# Patient Record
Sex: FEMALE | Race: WHITE | HISPANIC OR LATINO | Employment: FULL TIME | URBAN - METROPOLITAN AREA
[De-identification: names, ages, dates, MRNs, and addresses within clinical notes are randomized per-mention and may not be internally consistent; named-entity substitution may affect disease eponyms.]

---

## 2017-04-28 ENCOUNTER — GENERIC CONVERSION - ENCOUNTER (OUTPATIENT)
Dept: OTHER | Facility: OTHER | Age: 52
End: 2017-04-28

## 2017-06-06 ENCOUNTER — ALLSCRIPTS OFFICE VISIT (OUTPATIENT)
Dept: OTHER | Facility: OTHER | Age: 52
End: 2017-06-06

## 2017-06-06 DIAGNOSIS — I47.1 SUPRAVENTRICULAR TACHYCARDIA (HCC): ICD-10-CM

## 2017-07-07 PROBLEM — I47.1 SVT (SUPRAVENTRICULAR TACHYCARDIA) (HCC): Chronic | Status: ACTIVE | Noted: 2017-07-07

## 2017-07-07 PROBLEM — I47.10 SVT (SUPRAVENTRICULAR TACHYCARDIA): Chronic | Status: ACTIVE | Noted: 2017-07-07

## 2017-07-07 RX ORDER — PANTOPRAZOLE SODIUM 40 MG/1
40 INJECTION, POWDER, FOR SOLUTION INTRAVENOUS ONCE
Status: CANCELLED | OUTPATIENT
Start: 2017-07-07 | End: 2017-07-07

## 2017-07-10 ENCOUNTER — HOSPITAL ENCOUNTER (OUTPATIENT)
Dept: NON INVASIVE DIAGNOSTICS | Facility: HOSPITAL | Age: 52
Discharge: HOME | End: 2017-07-10
Attending: INTERNAL MEDICINE | Admitting: INTERNAL MEDICINE
Payer: COMMERCIAL

## 2017-07-10 ENCOUNTER — ANESTHESIA (OUTPATIENT)
Dept: SURGERY | Facility: HOSPITAL | Age: 52
End: 2017-07-10
Payer: COMMERCIAL

## 2017-07-10 ENCOUNTER — ANESTHESIA EVENT (OUTPATIENT)
Dept: SURGERY | Facility: HOSPITAL | Age: 52
End: 2017-07-10
Payer: COMMERCIAL

## 2017-07-10 VITALS
HEART RATE: 81 BPM | DIASTOLIC BLOOD PRESSURE: 58 MMHG | BODY MASS INDEX: 23.73 KG/M2 | WEIGHT: 125.66 LBS | OXYGEN SATURATION: 98 % | SYSTOLIC BLOOD PRESSURE: 91 MMHG | TEMPERATURE: 97.5 F | HEIGHT: 61 IN | RESPIRATION RATE: 18 BRPM

## 2017-07-10 DIAGNOSIS — I47.1 PAROXYSMAL SUPRAVENTRICULAR TACHYCARDIA (HCC): ICD-10-CM

## 2017-07-10 LAB
ANION GAP SERPL CALCULATED.3IONS-SCNC: 6 MMOL/L (ref 4–13)
APTT PPP: 28 SECONDS (ref 23–35)
ATRIAL RATE: 73 BPM
BUN SERPL-MCNC: 22 MG/DL (ref 5–25)
CALCIUM SERPL-MCNC: 8.8 MG/DL (ref 8.3–10.1)
CHLORIDE SERPL-SCNC: 107 MMOL/L (ref 100–108)
CO2 SERPL-SCNC: 28 MMOL/L (ref 21–32)
CREAT SERPL-MCNC: 0.76 MG/DL (ref 0.6–1.3)
ERYTHROCYTE [DISTWIDTH] IN BLOOD BY AUTOMATED COUNT: 12.8 % (ref 11.6–15.1)
GFR SERPL CREATININE-BSD FRML MDRD: >60 ML/MIN/1.73SQ M
GLUCOSE P FAST SERPL-MCNC: 82 MG/DL (ref 65–99)
GLUCOSE SERPL-MCNC: 82 MG/DL (ref 65–140)
HCT VFR BLD AUTO: 43.2 % (ref 34.8–46.1)
HGB BLD-MCNC: 14.2 G/DL (ref 11.5–15.4)
MCH RBC QN AUTO: 30.9 PG (ref 26.8–34.3)
MCHC RBC AUTO-ENTMCNC: 32.9 G/DL (ref 31.4–37.4)
MCV RBC AUTO: 94 FL (ref 82–98)
P AXIS: 65 DEGREES
PLATELET # BLD AUTO: 253 THOUSANDS/UL (ref 149–390)
PMV BLD AUTO: 9.4 FL (ref 8.9–12.7)
POTASSIUM SERPL-SCNC: 3.7 MMOL/L (ref 3.5–5.3)
PR INTERVAL: 150 MS
QRS AXIS: 56 DEGREES
QRSD INTERVAL: 94 MS
QT INTERVAL: 372 MS
QTC INTERVAL: 409 MS
RBC # BLD AUTO: 4.6 MILLION/UL (ref 3.81–5.12)
SODIUM SERPL-SCNC: 141 MMOL/L (ref 136–145)
T WAVE AXIS: 41 DEGREES
VENTRICULAR RATE: 73 BPM
WBC # BLD AUTO: 4.6 THOUSAND/UL (ref 4.31–10.16)

## 2017-07-10 PROCEDURE — 93653 COMPRE EP EVAL TX SVT: CPT | Performed by: INTERNAL MEDICINE

## 2017-07-10 PROCEDURE — C1894 INTRO/SHEATH, NON-LASER: HCPCS | Performed by: INTERNAL MEDICINE

## 2017-07-10 PROCEDURE — C1730 CATH, EP, 19 OR FEW ELECT: HCPCS | Performed by: INTERNAL MEDICINE

## 2017-07-10 PROCEDURE — 85027 COMPLETE CBC AUTOMATED: CPT | Performed by: INTERNAL MEDICINE

## 2017-07-10 PROCEDURE — 93005 ELECTROCARDIOGRAM TRACING: CPT | Performed by: INTERNAL MEDICINE

## 2017-07-10 PROCEDURE — 80048 BASIC METABOLIC PNL TOTAL CA: CPT | Performed by: INTERNAL MEDICINE

## 2017-07-10 PROCEDURE — C1733 CATH, EP, OTHR THAN COOL-TIP: HCPCS | Performed by: INTERNAL MEDICINE

## 2017-07-10 PROCEDURE — 85730 THROMBOPLASTIN TIME PARTIAL: CPT | Performed by: INTERNAL MEDICINE

## 2017-07-10 PROCEDURE — 93613 INTRACARDIAC EPHYS 3D MAPG: CPT | Performed by: INTERNAL MEDICINE

## 2017-07-10 PROCEDURE — 93621 COMP EP EVL L PAC&REC C SINS: CPT | Performed by: INTERNAL MEDICINE

## 2017-07-10 RX ORDER — LIDOCAINE HYDROCHLORIDE 10 MG/ML
INJECTION, SOLUTION INFILTRATION; PERINEURAL AS NEEDED
Status: DISCONTINUED | OUTPATIENT
Start: 2017-07-10 | End: 2017-07-10 | Stop reason: SURG

## 2017-07-10 RX ORDER — HEPARIN SODIUM 1000 [USP'U]/ML
INJECTION, SOLUTION INTRAVENOUS; SUBCUTANEOUS AS NEEDED
Status: DISCONTINUED | OUTPATIENT
Start: 2017-07-10 | End: 2017-07-10 | Stop reason: SURG

## 2017-07-10 RX ORDER — LIDOCAINE HYDROCHLORIDE 10 MG/ML
INJECTION, SOLUTION INFILTRATION; PERINEURAL CODE/TRAUMA/SEDATION MEDICATION
Status: COMPLETED | OUTPATIENT
Start: 2017-07-10 | End: 2017-07-10

## 2017-07-10 RX ORDER — PANTOPRAZOLE SODIUM 40 MG/1
40 INJECTION, POWDER, FOR SOLUTION INTRAVENOUS ONCE
Status: DISCONTINUED | OUTPATIENT
Start: 2017-07-10 | End: 2017-07-10 | Stop reason: HOSPADM

## 2017-07-10 RX ORDER — PROTAMINE SULFATE 10 MG/ML
INJECTION, SOLUTION INTRAVENOUS AS NEEDED
Status: DISCONTINUED | OUTPATIENT
Start: 2017-07-10 | End: 2017-07-10 | Stop reason: SURG

## 2017-07-10 RX ORDER — FENTANYL CITRATE 50 UG/ML
INJECTION, SOLUTION INTRAMUSCULAR; INTRAVENOUS AS NEEDED
Status: DISCONTINUED | OUTPATIENT
Start: 2017-07-10 | End: 2017-07-10 | Stop reason: SURG

## 2017-07-10 RX ORDER — MIDAZOLAM HYDROCHLORIDE 1 MG/ML
INJECTION INTRAMUSCULAR; INTRAVENOUS AS NEEDED
Status: DISCONTINUED | OUTPATIENT
Start: 2017-07-10 | End: 2017-07-10 | Stop reason: SURG

## 2017-07-10 RX ORDER — SODIUM CHLORIDE 9 MG/ML
INJECTION, SOLUTION INTRAVENOUS CONTINUOUS PRN
Status: DISCONTINUED | OUTPATIENT
Start: 2017-07-10 | End: 2017-07-10 | Stop reason: SURG

## 2017-07-10 RX ORDER — EPHEDRINE SULFATE 50 MG/ML
INJECTION, SOLUTION INTRAVENOUS AS NEEDED
Status: DISCONTINUED | OUTPATIENT
Start: 2017-07-10 | End: 2017-07-10 | Stop reason: SURG

## 2017-07-10 RX ORDER — PROPOFOL 10 MG/ML
INJECTION, EMULSION INTRAVENOUS CONTINUOUS PRN
Status: DISCONTINUED | OUTPATIENT
Start: 2017-07-10 | End: 2017-07-10 | Stop reason: SURG

## 2017-07-10 RX ADMIN — MIDAZOLAM HYDROCHLORIDE 2 MG: 1 INJECTION, SOLUTION INTRAMUSCULAR; INTRAVENOUS at 07:50

## 2017-07-10 RX ADMIN — HEPARIN SODIUM 2500 UNITS: 1000 INJECTION INTRAVENOUS; SUBCUTANEOUS at 09:22

## 2017-07-10 RX ADMIN — FENTANYL CITRATE 100 MCG: 50 INJECTION, SOLUTION INTRAMUSCULAR; INTRAVENOUS at 08:29

## 2017-07-10 RX ADMIN — LIDOCAINE HYDROCHLORIDE 20 ML: 10 INJECTION, SOLUTION INFILTRATION; PERINEURAL at 08:32

## 2017-07-10 RX ADMIN — PROTAMINE SULFATE 20 MG: 10 INJECTION, SOLUTION INTRAVENOUS at 10:07

## 2017-07-10 RX ADMIN — EPHEDRINE SULFATE 5 MG: 50 INJECTION, SOLUTION INTRAMUSCULAR; INTRAVENOUS; SUBCUTANEOUS at 08:23

## 2017-07-10 RX ADMIN — EPHEDRINE SULFATE 5 MG: 50 INJECTION, SOLUTION INTRAMUSCULAR; INTRAVENOUS; SUBCUTANEOUS at 08:44

## 2017-07-10 RX ADMIN — PROPOFOL 50 MCG/KG/MIN: 10 INJECTION, EMULSION INTRAVENOUS at 08:01

## 2017-07-10 RX ADMIN — LIDOCAINE HYDROCHLORIDE 50 MG: 10 INJECTION, SOLUTION INFILTRATION; PERINEURAL at 08:01

## 2017-07-10 RX ADMIN — HEPARIN SODIUM 5000 UNITS: 1000 INJECTION INTRAVENOUS; SUBCUTANEOUS at 08:39

## 2017-07-10 RX ADMIN — EPHEDRINE SULFATE 5 MG: 50 INJECTION, SOLUTION INTRAMUSCULAR; INTRAVENOUS; SUBCUTANEOUS at 08:33

## 2017-07-10 RX ADMIN — CEFAZOLIN SODIUM 1000 MG: 1 SOLUTION INTRAVENOUS at 08:22

## 2017-07-10 RX ADMIN — SODIUM CHLORIDE: 0.9 INJECTION, SOLUTION INTRAVENOUS at 06:58

## 2018-01-14 VITALS — DIASTOLIC BLOOD PRESSURE: 68 MMHG | HEART RATE: 66 BPM | WEIGHT: 126 LBS | SYSTOLIC BLOOD PRESSURE: 110 MMHG

## 2018-07-28 ENCOUNTER — APPOINTMENT (OUTPATIENT)
Dept: LAB | Facility: HOSPITAL | Age: 53
End: 2018-07-28
Attending: INTERNAL MEDICINE
Payer: COMMERCIAL

## 2018-07-28 ENCOUNTER — TRANSCRIBE ORDERS (OUTPATIENT)
Dept: ADMINISTRATIVE | Facility: HOSPITAL | Age: 53
End: 2018-07-28

## 2018-07-28 DIAGNOSIS — E55.9 AVITAMINOSIS D: ICD-10-CM

## 2018-07-28 DIAGNOSIS — D50.9 IRON DEFICIENCY ANEMIA, UNSPECIFIED IRON DEFICIENCY ANEMIA TYPE: ICD-10-CM

## 2018-07-28 DIAGNOSIS — I47.1 PAROXYSMAL SUPRAVENTRICULAR TACHYCARDIA (HCC): ICD-10-CM

## 2018-07-28 DIAGNOSIS — I47.1 PAROXYSMAL SUPRAVENTRICULAR TACHYCARDIA (HCC): Primary | ICD-10-CM

## 2018-07-28 LAB
25(OH)D3 SERPL-MCNC: 26.7 NG/ML (ref 30–100)
ALBUMIN SERPL BCP-MCNC: 4.3 G/DL (ref 3.5–5.7)
ALP SERPL-CCNC: 40 U/L (ref 40–150)
ALT SERPL W P-5'-P-CCNC: 8 U/L (ref 7–52)
ANION GAP SERPL CALCULATED.3IONS-SCNC: 8 MMOL/L (ref 4–13)
AST SERPL W P-5'-P-CCNC: 12 U/L (ref 13–39)
BACTERIA UR QL AUTO: ABNORMAL /HPF
BASOPHILS # BLD AUTO: 0 THOUSANDS/ΜL (ref 0–0.1)
BASOPHILS NFR BLD AUTO: 1 % (ref 0–2)
BILIRUB SERPL-MCNC: 0.6 MG/DL (ref 0.2–1)
BILIRUB UR QL STRIP: NEGATIVE
BUN SERPL-MCNC: 15 MG/DL (ref 7–25)
CALCIUM SERPL-MCNC: 9.5 MG/DL (ref 8.6–10.5)
CHLORIDE SERPL-SCNC: 103 MMOL/L (ref 98–107)
CLARITY UR: CLEAR
CO2 SERPL-SCNC: 29 MMOL/L (ref 21–31)
COLOR UR: ABNORMAL
CREAT SERPL-MCNC: 0.8 MG/DL (ref 0.6–1.2)
EOSINOPHIL # BLD AUTO: 0 THOUSAND/ΜL (ref 0–0.61)
EOSINOPHIL NFR BLD AUTO: 1 % (ref 0–5)
ERYTHROCYTE [DISTWIDTH] IN BLOOD BY AUTOMATED COUNT: 12.9 % (ref 11.5–14.5)
FERRITIN SERPL-MCNC: 31 NG/ML (ref 8–388)
GFR SERPL CREATININE-BSD FRML MDRD: 84 ML/MIN/1.73SQ M
GLUCOSE SERPL-MCNC: 93 MG/DL (ref 65–99)
GLUCOSE UR STRIP-MCNC: NEGATIVE MG/DL
HCT VFR BLD AUTO: 40.8 % (ref 34.8–46.1)
HGB BLD-MCNC: 14 G/DL (ref 12–16)
HGB UR QL STRIP.AUTO: ABNORMAL
IRON SATN MFR SERPL: 29 %
IRON SERPL-MCNC: 94 UG/DL (ref 50–170)
KETONES UR STRIP-MCNC: NEGATIVE MG/DL
LDLC SERPL DIRECT ASSAY-MCNC: 81.2 MG/DL (ref 75–193)
LEUKOCYTE ESTERASE UR QL STRIP: ABNORMAL
LYMPHOCYTES # BLD AUTO: 1.6 THOUSANDS/ΜL (ref 0.6–4.47)
LYMPHOCYTES NFR BLD AUTO: 44 % (ref 21–51)
MCH RBC QN AUTO: 31.9 PG (ref 26–34)
MCHC RBC AUTO-ENTMCNC: 34.4 G/DL (ref 31–37)
MCV RBC AUTO: 93 FL (ref 81–99)
MONOCYTES # BLD AUTO: 0.3 THOUSAND/ΜL (ref 0.17–1.22)
MONOCYTES NFR BLD AUTO: 10 % (ref 2–12)
NEUTROPHILS # BLD AUTO: 1.6 THOUSANDS/ΜL (ref 1.4–6.5)
NEUTS SEG NFR BLD AUTO: 45 % (ref 42–75)
NITRITE UR QL STRIP: NEGATIVE
NON-SQ EPI CELLS URNS QL MICRO: ABNORMAL /HPF
NRBC BLD AUTO-RTO: 0 /100 WBCS
PH UR STRIP.AUTO: 7 [PH] (ref 5–8)
PLATELET # BLD AUTO: 250 THOUSANDS/UL (ref 149–390)
PMV BLD AUTO: 7.7 FL (ref 8.6–11.7)
POTASSIUM SERPL-SCNC: 3.6 MMOL/L (ref 3.5–5.5)
PROT SERPL-MCNC: 6.7 G/DL (ref 6.4–8.9)
PROT UR STRIP-MCNC: NEGATIVE MG/DL
RBC # BLD AUTO: 4.4 MILLION/UL (ref 3.9–5.2)
RBC #/AREA URNS AUTO: ABNORMAL /HPF
SODIUM SERPL-SCNC: 140 MMOL/L (ref 134–143)
SP GR UR STRIP.AUTO: 1.01 (ref 1–1.03)
TIBC SERPL-MCNC: 323 UG/DL (ref 250–450)
TRIGL SERPL-MCNC: 44 MG/DL (ref 44–166)
UROBILINOGEN UR QL STRIP.AUTO: 0.2 E.U./DL
WBC # BLD AUTO: 3.6 THOUSAND/UL (ref 4.8–10.8)
WBC #/AREA URNS AUTO: ABNORMAL /HPF

## 2018-07-28 PROCEDURE — 83550 IRON BINDING TEST: CPT

## 2018-07-28 PROCEDURE — 83721 ASSAY OF BLOOD LIPOPROTEIN: CPT

## 2018-07-28 PROCEDURE — 81001 URINALYSIS AUTO W/SCOPE: CPT

## 2018-07-28 PROCEDURE — 84478 ASSAY OF TRIGLYCERIDES: CPT

## 2018-07-28 PROCEDURE — 82728 ASSAY OF FERRITIN: CPT

## 2018-07-28 PROCEDURE — 80053 COMPREHEN METABOLIC PANEL: CPT

## 2018-07-28 PROCEDURE — 82306 VITAMIN D 25 HYDROXY: CPT

## 2018-07-28 PROCEDURE — 85025 COMPLETE CBC W/AUTO DIFF WBC: CPT

## 2018-07-28 PROCEDURE — 36415 COLL VENOUS BLD VENIPUNCTURE: CPT

## 2018-07-28 PROCEDURE — 83540 ASSAY OF IRON: CPT

## 2018-10-09 ENCOUNTER — TRANSCRIBE ORDERS (OUTPATIENT)
Dept: ADMINISTRATIVE | Facility: HOSPITAL | Age: 53
End: 2018-10-09

## 2018-10-09 DIAGNOSIS — Z12.39 SCREENING BREAST EXAMINATION: Primary | ICD-10-CM

## 2018-10-16 ENCOUNTER — HOSPITAL ENCOUNTER (OUTPATIENT)
Dept: MAMMOGRAPHY | Facility: HOSPITAL | Age: 53
Discharge: HOME | End: 2018-10-16
Attending: SPECIALIST
Payer: COMMERCIAL

## 2018-10-16 DIAGNOSIS — Z12.39 SCREENING BREAST EXAMINATION: ICD-10-CM

## 2018-10-16 PROCEDURE — 77067 SCR MAMMO BI INCL CAD: CPT

## 2018-10-16 PROCEDURE — 77063 BREAST TOMOSYNTHESIS BI: CPT

## 2018-10-24 ENCOUNTER — TRANSCRIBE ORDERS (OUTPATIENT)
Dept: ADMINISTRATIVE | Facility: HOSPITAL | Age: 53
End: 2018-10-24

## 2018-10-24 ENCOUNTER — APPOINTMENT (OUTPATIENT)
Dept: LAB | Facility: HOSPITAL | Age: 53
End: 2018-10-24
Payer: COMMERCIAL

## 2018-10-24 DIAGNOSIS — R53.83 FATIGUE, UNSPECIFIED TYPE: Primary | ICD-10-CM

## 2018-10-24 DIAGNOSIS — R53.83 FATIGUE, UNSPECIFIED TYPE: ICD-10-CM

## 2018-10-24 LAB
T3FREE SERPL-MCNC: 2.83 PG/ML (ref 2.3–4.2)
TSH SERPL DL<=0.05 MIU/L-ACNC: 2.11 UIU/ML (ref 0.45–5.33)

## 2018-10-24 PROCEDURE — 36415 COLL VENOUS BLD VENIPUNCTURE: CPT

## 2018-10-24 PROCEDURE — 84443 ASSAY THYROID STIM HORMONE: CPT

## 2018-10-24 PROCEDURE — 84481 FREE ASSAY (FT-3): CPT

## 2018-10-31 ENCOUNTER — TRANSCRIBE ORDERS (OUTPATIENT)
Dept: ADMINISTRATIVE | Facility: HOSPITAL | Age: 53
End: 2018-10-31

## 2018-10-31 DIAGNOSIS — R92.8 ABNORMAL MAMMOGRAM: Primary | ICD-10-CM

## 2018-11-13 ENCOUNTER — HOSPITAL ENCOUNTER (OUTPATIENT)
Dept: MAMMOGRAPHY | Facility: HOSPITAL | Age: 53
Discharge: HOME | End: 2018-11-13
Attending: SPECIALIST
Payer: COMMERCIAL

## 2018-11-13 DIAGNOSIS — R92.8 ABNORMAL MAMMOGRAM: ICD-10-CM

## 2018-11-13 PROCEDURE — G0279 TOMOSYNTHESIS, MAMMO: HCPCS

## 2018-11-13 PROCEDURE — 77065 DX MAMMO INCL CAD UNI: CPT

## 2018-12-12 ENCOUNTER — APPOINTMENT (OUTPATIENT)
Dept: LAB | Facility: HOSPITAL | Age: 53
End: 2018-12-12
Payer: COMMERCIAL

## 2018-12-12 ENCOUNTER — TRANSCRIBE ORDERS (OUTPATIENT)
Dept: ADMINISTRATIVE | Facility: HOSPITAL | Age: 53
End: 2018-12-12

## 2018-12-12 DIAGNOSIS — N95.9 MENOPAUSAL PROBLEM: Primary | ICD-10-CM

## 2018-12-12 DIAGNOSIS — N95.9 MENOPAUSAL PROBLEM: ICD-10-CM

## 2018-12-12 LAB
BASOPHILS # BLD AUTO: 0 THOUSANDS/ΜL (ref 0–0.1)
BASOPHILS NFR BLD AUTO: 1 % (ref 0–2)
EOSINOPHIL # BLD AUTO: 0.1 THOUSAND/ΜL (ref 0–0.61)
EOSINOPHIL NFR BLD AUTO: 2 % (ref 0–5)
ERYTHROCYTE [DISTWIDTH] IN BLOOD BY AUTOMATED COUNT: 13.2 % (ref 11.5–14.5)
HCT VFR BLD AUTO: 42.2 % (ref 34.8–46.1)
HGB BLD-MCNC: 13.7 G/DL (ref 12–16)
LYMPHOCYTES # BLD AUTO: 1.8 THOUSANDS/ΜL (ref 0.6–4.47)
LYMPHOCYTES NFR BLD AUTO: 37 % (ref 21–51)
MCH RBC QN AUTO: 30.7 PG (ref 26–34)
MCHC RBC AUTO-ENTMCNC: 32.5 G/DL (ref 31–37)
MCV RBC AUTO: 94 FL (ref 81–99)
MONOCYTES # BLD AUTO: 0.5 THOUSAND/ΜL (ref 0.17–1.22)
MONOCYTES NFR BLD AUTO: 10 % (ref 2–12)
NEUTROPHILS # BLD AUTO: 2.5 THOUSANDS/ΜL (ref 1.4–6.5)
NEUTS SEG NFR BLD AUTO: 50 % (ref 42–75)
NRBC BLD AUTO-RTO: 0 /100 WBCS
PLATELET # BLD AUTO: 253 THOUSANDS/UL (ref 149–390)
PMV BLD AUTO: 7.8 FL (ref 8.6–11.7)
RBC # BLD AUTO: 4.48 MILLION/UL (ref 3.9–5.2)
WBC # BLD AUTO: 4.9 THOUSAND/UL (ref 4.8–10.8)

## 2018-12-12 PROCEDURE — 85025 COMPLETE CBC W/AUTO DIFF WBC: CPT

## 2018-12-12 PROCEDURE — 36415 COLL VENOUS BLD VENIPUNCTURE: CPT

## 2019-02-04 ENCOUNTER — APPOINTMENT (EMERGENCY)
Dept: CT IMAGING | Facility: HOSPITAL | Age: 54
End: 2019-02-04
Payer: COMMERCIAL

## 2019-02-04 ENCOUNTER — HOSPITAL ENCOUNTER (EMERGENCY)
Facility: HOSPITAL | Age: 54
Discharge: HOME | End: 2019-02-04
Attending: EMERGENCY MEDICINE | Admitting: EMERGENCY MEDICINE
Payer: COMMERCIAL

## 2019-02-04 VITALS
BODY MASS INDEX: 23.41 KG/M2 | DIASTOLIC BLOOD PRESSURE: 83 MMHG | OXYGEN SATURATION: 100 % | RESPIRATION RATE: 18 BRPM | SYSTOLIC BLOOD PRESSURE: 138 MMHG | HEART RATE: 95 BPM | WEIGHT: 124 LBS | HEIGHT: 61 IN | TEMPERATURE: 98.5 F

## 2019-02-04 DIAGNOSIS — R51.9 NONINTRACTABLE HEADACHE, UNSPECIFIED CHRONICITY PATTERN, UNSPECIFIED HEADACHE TYPE: Primary | ICD-10-CM

## 2019-02-04 LAB
ALBUMIN SERPL BCP-MCNC: 4.2 G/DL (ref 3.5–5.7)
ALP SERPL-CCNC: 47 U/L (ref 40–150)
ALT SERPL W P-5'-P-CCNC: 12 U/L (ref 7–52)
ANION GAP SERPL CALCULATED.3IONS-SCNC: 7 MMOL/L (ref 4–13)
APTT PPP: 29 SECONDS (ref 26–38)
AST SERPL W P-5'-P-CCNC: 13 U/L (ref 13–39)
BASOPHILS # BLD AUTO: 0 THOUSANDS/ΜL (ref 0–0.1)
BASOPHILS NFR BLD AUTO: 1 % (ref 0–2)
BILIRUB SERPL-MCNC: 0.5 MG/DL (ref 0.2–1)
BUN SERPL-MCNC: 21 MG/DL (ref 7–25)
CALCIUM SERPL-MCNC: 9.2 MG/DL (ref 8.6–10.5)
CHLORIDE SERPL-SCNC: 102 MMOL/L (ref 98–107)
CO2 SERPL-SCNC: 28 MMOL/L (ref 21–31)
CREAT SERPL-MCNC: 0.74 MG/DL (ref 0.6–1.2)
EOSINOPHIL # BLD AUTO: 0 THOUSAND/ΜL (ref 0–0.61)
EOSINOPHIL NFR BLD AUTO: 0 % (ref 0–5)
ERYTHROCYTE [DISTWIDTH] IN BLOOD BY AUTOMATED COUNT: 13 % (ref 11.5–14.5)
GFR SERPL CREATININE-BSD FRML MDRD: 93 ML/MIN/1.73SQ M
GLUCOSE SERPL-MCNC: 101 MG/DL (ref 65–99)
HCT VFR BLD AUTO: 43.1 % (ref 34.8–46.1)
HGB BLD-MCNC: 14.3 G/DL (ref 12–16)
INR PPP: 0.96 (ref 0.9–1.5)
LYMPHOCYTES # BLD AUTO: 0.9 THOUSANDS/ΜL (ref 0.6–4.47)
LYMPHOCYTES NFR BLD AUTO: 12 % (ref 21–51)
MAGNESIUM SERPL-MCNC: 1.9 MG/DL (ref 1.9–2.7)
MCH RBC QN AUTO: 31 PG (ref 26–34)
MCHC RBC AUTO-ENTMCNC: 33.1 G/DL (ref 31–37)
MCV RBC AUTO: 94 FL (ref 81–99)
MONOCYTES # BLD AUTO: 0.3 THOUSAND/ΜL (ref 0.17–1.22)
MONOCYTES NFR BLD AUTO: 4 % (ref 2–12)
NEUTROPHILS # BLD AUTO: 6 THOUSANDS/ΜL (ref 1.4–6.5)
NEUTS SEG NFR BLD AUTO: 83 % (ref 42–75)
NRBC BLD AUTO-RTO: 0 /100 WBCS
PLATELET # BLD AUTO: 244 THOUSANDS/UL (ref 149–390)
PMV BLD AUTO: 7.6 FL (ref 8.6–11.7)
POTASSIUM SERPL-SCNC: 3.5 MMOL/L (ref 3.5–5.5)
PROT SERPL-MCNC: 6.6 G/DL (ref 6.4–8.9)
PROTHROMBIN TIME: 11.1 SECONDS (ref 10.2–13)
RBC # BLD AUTO: 4.6 MILLION/UL (ref 3.9–5.2)
SODIUM SERPL-SCNC: 137 MMOL/L (ref 134–143)
WBC # BLD AUTO: 7.2 THOUSAND/UL (ref 4.8–10.8)

## 2019-02-04 PROCEDURE — 85025 COMPLETE CBC W/AUTO DIFF WBC: CPT | Performed by: EMERGENCY MEDICINE

## 2019-02-04 PROCEDURE — 70450 CT HEAD/BRAIN W/O DYE: CPT

## 2019-02-04 PROCEDURE — 85730 THROMBOPLASTIN TIME PARTIAL: CPT | Performed by: EMERGENCY MEDICINE

## 2019-02-04 PROCEDURE — 80053 COMPREHEN METABOLIC PANEL: CPT | Performed by: EMERGENCY MEDICINE

## 2019-02-04 PROCEDURE — 99284 EMERGENCY DEPT VISIT MOD MDM: CPT

## 2019-02-04 PROCEDURE — 85610 PROTHROMBIN TIME: CPT | Performed by: EMERGENCY MEDICINE

## 2019-02-04 PROCEDURE — 83735 ASSAY OF MAGNESIUM: CPT | Performed by: EMERGENCY MEDICINE

## 2019-02-04 PROCEDURE — 36415 COLL VENOUS BLD VENIPUNCTURE: CPT | Performed by: EMERGENCY MEDICINE

## 2019-02-04 RX ORDER — PAROXETINE HYDROCHLORIDE 20 MG/1
10 TABLET, FILM COATED ORAL DAILY
COMMUNITY
End: 2020-06-22

## 2019-02-04 NOTE — DISCHARGE INSTRUCTIONS
Acute Headache, Ambulatory Care   GENERAL INFORMATION:   An acute headache  is pain or discomfort that starts suddenly and gets worse quickly  The cause of an acute headache may not be known  It may be triggered by stress, fatigue, hormones, food, or trauma  Common related symptoms include the following:   · Fever    · Sinus pressure    · Loss of memory    · Nausea or vomiting    · Problems with your vision, such as watery or red eyes, loss of vision, or pain in bright light    · Stiff neck    · Tenderness of the head and neck area    · Trouble staying awake, or being less alert than usual     · Weakness or less energy  Seek immediate care for the following symptoms:   · Severe pain    · A headache that occurs after a blow to the head, a fall, or other trauma     · Confusion or forgetfulness    · Numbness on one side of your face or body  Treatment for an acute headache  may include medicine to decrease pain  You may also need biofeedback or cognitive behavioral therapy  Ask your healthcare provider about these and other treatments for an acute headache  Manage my symptoms:   · Apply heat  on your head for 20 to 30 minutes every 2 hours for as many days as directed  Heat helps decrease pain and muscle spasms  You may alternate heat and ice  · Apply ice  on your head for 15 to 20 minutes every hour or as directed  Use an ice pack, or put crushed ice in a plastic bag  Cover it with a towel  Ice helps decrease pain  · Relax your muscles  Lie down in a comfortable position and close your eyes  Relax your muscles slowly  Start at your toes and work your way up your body  · Keep a record of your headaches  Write down when your headaches start and stop  Include your symptoms and what you were doing when the headache began  Record what you ate or drank for 24 hours before the headache started  Describe the pain and where it hurts  Keep track of what you did to treat your headache and whether it worked    Follow up with your healthcare provider as directed:  Bring your headache record with you when you see your healthcare provider  Write down your questions so you remember to ask them during your visits  CARE AGREEMENT:   You have the right to help plan your care  Learn about your health condition and how it may be treated  Discuss treatment options with your caregivers to decide what care you want to receive  You always have the right to refuse treatment  The above information is an  only  It is not intended as medical advice for individual conditions or treatments  Talk to your doctor, nurse or pharmacist before following any medical regimen to see if it is safe and effective for you  © 2014 [address] is for End User's use only and may not be sold, redistributed or otherwise used for commercial purposes  All illustrations and images included in CareNotes® are the copyrighted property of A D [employer] M [employer]  or Jhonatan Yeh

## 2019-02-04 NOTE — ED NOTES
Pt  States of massive headache since 0700 today  States became nauseated and vomited  Denies previous history  States going through menopause since September-- placed on Paxil to asssist in sleeping  States of pressure in forehead at present  Took Advil with relief  States has happened several times in the past and didn't get workup         Yajaira Steve, ALTAGRACIA  02/04/19 881 Hanna Camargo, ALTAGRACIA  02/04/19 3689

## 2019-02-04 NOTE — ED PROVIDER NOTES
History  Chief Complaint   Patient presents with    Headache     43-year-old female presents for evaluation of headache  Patient reports a headache described as sharp, bifrontal and nonradiating which is currently resolved  Headache's have been sharp and normally associated with an episode of vomiting which resolves the headache  Patient with an episode this afternoon and 1 episode 2 weeks prior  Prior to that patient with an episode 2 months prior  Patient reports no neurologic symptoms during these episodes, no slurred speech, no facial droop, no focal neurologic deficit  History provided by:  Patient   used: No        Prior to Admission Medications   Prescriptions Last Dose Informant Patient Reported? Taking? PARoxetine (PAXIL) 20 mg tablet   Yes Yes   Sig: Take 10 mg by mouth daily      Facility-Administered Medications: None       No past medical history on file  Past Surgical History:   Procedure Laterality Date    TUBAL LIGATION         No family history on file  I have reviewed and agree with the history as documented  Social History   Substance Use Topics    Smoking status: Never Smoker    Smokeless tobacco: Not on file    Alcohol use Not on file        Review of Systems   Constitutional: Negative for chills and fever  HENT: Negative for rhinorrhea and sore throat  Eyes: Negative for visual disturbance  Respiratory: Negative for cough and shortness of breath  Cardiovascular: Negative for chest pain and leg swelling  Gastrointestinal: Positive for nausea and vomiting  Negative for abdominal pain and diarrhea  Genitourinary: Negative for dysuria  Musculoskeletal: Negative for back pain and myalgias  Skin: Negative for rash  Neurological: Positive for headaches  Negative for dizziness  Psychiatric/Behavioral: Negative for confusion  All other systems reviewed and are negative        Physical Exam  Physical Exam   Constitutional: She is oriented to person, place, and time  She appears well-developed and well-nourished  HENT:   Head: Normocephalic and atraumatic  Nose: Nose normal    Mouth/Throat: Oropharynx is clear and moist  No oropharyngeal exudate  Eyes: Pupils are equal, round, and reactive to light  Conjunctivae and EOM are normal  No scleral icterus  Neck: Normal range of motion  Neck supple  No JVD present  No tracheal deviation present  Cardiovascular: Normal rate, regular rhythm and normal heart sounds  No murmur heard  Pulmonary/Chest: Effort normal and breath sounds normal  No respiratory distress  She has no wheezes  She has no rales  Abdominal: Soft  Bowel sounds are normal  There is no tenderness  There is no guarding  Musculoskeletal: Normal range of motion  She exhibits no edema or tenderness  Neurological: She is alert and oriented to person, place, and time  She displays normal reflexes  No cranial nerve deficit or sensory deficit  She exhibits normal muscle tone  Coordination normal    5/5 motor, nl sens   Skin: Skin is warm and dry  Psychiatric: She has a normal mood and affect  Her behavior is normal    Nursing note and vitals reviewed        Vital Signs  ED Triage Vitals [02/04/19 1115]   Temperature Pulse Respirations Blood Pressure SpO2   98 5 °F (36 9 °C) 95 18 138/83 100 %      Temp Source Heart Rate Source Patient Position - Orthostatic VS BP Location FiO2 (%)   Temporal -- Sitting Left arm --      Pain Score       2           Vitals:    02/04/19 1115   BP: 138/83   Pulse: 95   Patient Position - Orthostatic VS: Sitting       Visual Acuity  Visual Acuity      Most Recent Value   L Pupil Size (mm)  3   R Pupil Size (mm)  3          ED Medications  Medications - No data to display    Diagnostic Studies  Results Reviewed     Procedure Component Value Units Date/Time    Protime-INR [[id number]]  (Normal) Collected:  02/04/19 1147    Lab Status:  Final result Specimen:  Blood from Arm, Left Updated: 02/04/19 1220     Protime 11 1 seconds      INR 0 96    APTT [[id number]]  (Normal) Collected:  02/04/19 1147    Lab Status:  Final result Specimen:  Blood from Arm, Left Updated:  02/04/19 1220     PTT 29 seconds     Comprehensive metabolic panel [[id number]]  (Abnormal) Collected:  02/04/19 1147    Lab Status:  Final result Specimen:  Blood from Arm, Left Updated:  02/04/19 1216     Sodium 137 mmol/L      Potassium 3 5 mmol/L      Chloride 102 mmol/L      CO2 28 mmol/L      ANION GAP 7 mmol/L      BUN 21 mg/dL      Creatinine 0 74 mg/dL      Glucose 101 (H) mg/dL      Calcium 9 2 mg/dL      AST 13 U/L      ALT 12 U/L      Alkaline Phosphatase 47 U/L      Total Protein 6 6 g/dL      Albumin 4 2 g/dL      Total Bilirubin 0 50 mg/dL      eGFR 93 ml/min/1 73sq m     Narrative:         National Kidney Disease Education Program recommendations are as follows:  GFR calculation is accurate only with a steady state creatinine  Chronic Kidney disease less than 60 ml/min/1 73 sq  meters  Kidney failure less than 15 ml/min/1 73 sq  meters      Magnesium [[id number]]  (Normal) Collected:  02/04/19 1147    Lab Status:  Final result Specimen:  Blood from Arm, Left Updated:  02/04/19 1216     Magnesium 1 9 mg/dL     CBC and differential [[id number]]  (Abnormal) Collected:  02/04/19 1147    Lab Status:  Final result Specimen:  Blood from Arm, Left Updated:  02/04/19 1201     WBC 7 20 Thousand/uL      RBC 4 60 Million/uL      Hemoglobin 14 3 g/dL      Hematocrit 43 1 %      MCV 94 fL      MCH 31 0 pg      MCHC 33 1 g/dL      RDW 13 0 %      MPV 7 6 (L) fL      Platelets 266 Thousands/uL      nRBC 0 /100 WBCs      Neutrophils Relative 83 (H) %      Lymphocytes Relative 12 (L) %      Monocytes Relative 4 %      Eosinophils Relative 0 %      Basophils Relative 1 %      Neutrophils Absolute 6 00 Thousands/µL      Lymphocytes Absolute 0 90 Thousands/µL      Monocytes Absolute 0 30 Thousand/µL      Eosinophils Absolute 0 00 Thousand/µL Basophils Absolute 0 00 Thousands/µL                  CT head without contrast   Final Result by Skyler Ellis MD (02/04 1216)      No acute intracranial abnormality  Workstation performed: [id number]                    Procedures  Procedures       Phone Contacts  ED Phone Contact    ED Course  ED Course as of Feb 05 0725   [facility] Feb 04, 2019   1126 Patient seen examined at bedside  Labs and imaging ordered    1212 Labs reviewed, no acute abnormalities noted  1251 Imaging reviewed no acute abnormality noted  Discussed with patient results of labs and imaging and plan for discharge home  As patient has had acute episodes of headache which are new recommends to patient that she has follow-up with PMD as an outpatient and recommend outpatient MRI for further evaluation  MDM    Disposition  Final diagnoses:   Nonintractable headache, unspecified chronicity pattern, unspecified headache type     Time reflects when diagnosis was documented in both MDM as applicable and the Disposition within this note     Time User Action Codes Description Comment    2/4/2019 12:50 PM Hayley Due Add [R51] Sinus headache     2/4/2019 12:50 PM Nandini Crow Remove [R51] Sinus headache     2/4/2019 12:51 PM Nandini Crow Add [R51] Nonintractable headache, unspecified chronicity pattern, unspecified headache type       ED Disposition     ED Disposition Condition Date/Time Comment    Discharge  Mon Feb 4, 2019 12:50 PM Madhu Newton discharge to home/self care  Condition at discharge: Stable        Follow-up Information    None         Discharge Medication List as of 2/4/2019 12:51 PM      CONTINUE these medications which have NOT CHANGED    Details   PARoxetine (PAXIL) 20 mg tablet Take 10 mg by mouth daily, Historical Med           No discharge procedures on file      ED Provider  Electronically Signed by           Dane Vogel DO  02/05/19 0835

## 2019-07-09 ENCOUNTER — TRANSCRIBE ORDERS (OUTPATIENT)
Dept: LAB | Facility: HOSPITAL | Age: 54
End: 2019-07-09

## 2019-07-09 ENCOUNTER — APPOINTMENT (OUTPATIENT)
Dept: LAB | Facility: HOSPITAL | Age: 54
End: 2019-07-09
Attending: PODIATRIST
Payer: COMMERCIAL

## 2019-07-09 DIAGNOSIS — K71.9 HEPATOTOXICITY: ICD-10-CM

## 2019-07-09 DIAGNOSIS — K71.9 HEPATOTOXICITY: Primary | ICD-10-CM

## 2019-07-09 LAB
ALBUMIN SERPL BCP-MCNC: 4.5 G/DL (ref 3.5–5.7)
ALP SERPL-CCNC: 44 U/L (ref 40–150)
ALT SERPL W P-5'-P-CCNC: 10 U/L (ref 7–52)
AST SERPL W P-5'-P-CCNC: 13 U/L (ref 13–39)
BILIRUB DIRECT SERPL-MCNC: 0.1 MG/DL (ref 0–0.2)
BILIRUB SERPL-MCNC: 0.4 MG/DL (ref 0.2–1)
PROT SERPL-MCNC: 7 G/DL (ref 6.4–8.9)

## 2019-07-09 PROCEDURE — 36415 COLL VENOUS BLD VENIPUNCTURE: CPT

## 2019-07-09 PROCEDURE — 80076 HEPATIC FUNCTION PANEL: CPT

## 2019-09-12 ENCOUNTER — TRANSCRIBE ORDERS (OUTPATIENT)
Dept: ADMINISTRATIVE | Facility: HOSPITAL | Age: 54
End: 2019-09-12

## 2019-09-12 DIAGNOSIS — R31.0 GROSS HEMATURIA: Primary | ICD-10-CM

## 2019-09-14 ENCOUNTER — HOSPITAL ENCOUNTER (OUTPATIENT)
Dept: ULTRASOUND IMAGING | Facility: HOSPITAL | Age: 54
Discharge: HOME | End: 2019-09-14
Attending: UROLOGY
Payer: COMMERCIAL

## 2019-09-14 DIAGNOSIS — R31.0 GROSS HEMATURIA: ICD-10-CM

## 2019-09-14 PROCEDURE — 76770 US EXAM ABDO BACK WALL COMP: CPT

## 2019-10-08 ENCOUNTER — APPOINTMENT (OUTPATIENT)
Dept: LAB | Facility: HOSPITAL | Age: 54
End: 2019-10-08
Attending: PODIATRIST
Payer: COMMERCIAL

## 2019-10-08 ENCOUNTER — TRANSCRIBE ORDERS (OUTPATIENT)
Dept: ADMINISTRATIVE | Facility: HOSPITAL | Age: 54
End: 2019-10-08

## 2019-10-08 DIAGNOSIS — Z92.29 HISTORY OF USE OF HEPATOTOXIC DRUG: ICD-10-CM

## 2019-10-08 DIAGNOSIS — Z92.29 HISTORY OF USE OF HEPATOTOXIC DRUG: Primary | ICD-10-CM

## 2019-10-08 LAB
ALBUMIN SERPL BCP-MCNC: 4 G/DL (ref 3.5–5)
ALP SERPL-CCNC: 57 U/L (ref 46–116)
ALT SERPL W P-5'-P-CCNC: 15 U/L (ref 12–78)
AST SERPL W P-5'-P-CCNC: 8 U/L (ref 5–45)
BILIRUB DIRECT SERPL-MCNC: 0.15 MG/DL (ref 0–0.2)
BILIRUB SERPL-MCNC: 0.54 MG/DL (ref 0.2–1)
PROT SERPL-MCNC: 7.2 G/DL (ref 6.4–8.2)

## 2019-10-08 PROCEDURE — 36415 COLL VENOUS BLD VENIPUNCTURE: CPT

## 2019-10-08 PROCEDURE — 80076 HEPATIC FUNCTION PANEL: CPT

## 2019-12-03 ENCOUNTER — TRANSCRIBE ORDERS (OUTPATIENT)
Dept: ADMINISTRATIVE | Facility: HOSPITAL | Age: 54
End: 2019-12-03

## 2019-12-03 DIAGNOSIS — Z12.31 SCREENING MAMMOGRAM FOR HIGH-RISK PATIENT: Primary | ICD-10-CM

## 2019-12-24 ENCOUNTER — HOSPITAL ENCOUNTER (OUTPATIENT)
Dept: MAMMOGRAPHY | Facility: HOSPITAL | Age: 54
Discharge: HOME | End: 2019-12-24
Attending: SPECIALIST
Payer: COMMERCIAL

## 2019-12-24 VITALS — HEIGHT: 61 IN | BODY MASS INDEX: 23.6 KG/M2 | WEIGHT: 125 LBS

## 2019-12-24 DIAGNOSIS — Z12.31 SCREENING MAMMOGRAM FOR HIGH-RISK PATIENT: ICD-10-CM

## 2019-12-24 PROCEDURE — 77067 SCR MAMMO BI INCL CAD: CPT

## 2019-12-24 PROCEDURE — 77063 BREAST TOMOSYNTHESIS BI: CPT

## 2020-03-24 ENCOUNTER — TELEPHONE (OUTPATIENT)
Dept: NEPHROLOGY | Facility: CLINIC | Age: 55
End: 2020-03-24

## 2020-03-24 DIAGNOSIS — J30.1 ALLERGIC RHINITIS DUE TO POLLEN, UNSPECIFIED SEASONALITY: Primary | ICD-10-CM

## 2020-03-24 RX ORDER — FLUTICASONE PROPIONATE 50 MCG
1 SPRAY, SUSPENSION (ML) NASAL DAILY
Qty: 1 BOTTLE | Refills: 1 | Status: SHIPPED | OUTPATIENT
Start: 2020-03-24 | End: 2021-05-03

## 2020-03-24 NOTE — TELEPHONE ENCOUNTER
Patient is requesting a refill of Flonase nasal spray  She states that she is currently looking for a new PCP and would like a one time refill on this med

## 2020-04-22 ENCOUNTER — OFFICE VISIT (OUTPATIENT)
Dept: URGENT CARE | Facility: CLINIC | Age: 55
End: 2020-04-22
Payer: COMMERCIAL

## 2020-04-22 ENCOUNTER — HOSPITAL ENCOUNTER (EMERGENCY)
Facility: HOSPITAL | Age: 55
Discharge: HOME | End: 2020-04-22
Attending: EMERGENCY MEDICINE | Admitting: EMERGENCY MEDICINE
Payer: COMMERCIAL

## 2020-04-22 VITALS
WEIGHT: 125 LBS | HEIGHT: 61 IN | HEART RATE: 85 BPM | TEMPERATURE: 98.3 F | DIASTOLIC BLOOD PRESSURE: 69 MMHG | OXYGEN SATURATION: 97 % | SYSTOLIC BLOOD PRESSURE: 124 MMHG | BODY MASS INDEX: 23.6 KG/M2

## 2020-04-22 VITALS
RESPIRATION RATE: 18 BRPM | BODY MASS INDEX: 23.6 KG/M2 | DIASTOLIC BLOOD PRESSURE: 60 MMHG | HEART RATE: 88 BPM | SYSTOLIC BLOOD PRESSURE: 96 MMHG | HEIGHT: 61 IN | WEIGHT: 125 LBS | TEMPERATURE: 98.2 F | OXYGEN SATURATION: 100 %

## 2020-04-22 DIAGNOSIS — R51.9 SEVERE HEADACHE: ICD-10-CM

## 2020-04-22 DIAGNOSIS — R11.2 NAUSEA AND VOMITING, INTRACTABILITY OF VOMITING NOT SPECIFIED, UNSPECIFIED VOMITING TYPE: Primary | ICD-10-CM

## 2020-04-22 DIAGNOSIS — R11.2 NAUSEA AND VOMITING: Primary | ICD-10-CM

## 2020-04-22 DIAGNOSIS — R51.9 HEADACHE: ICD-10-CM

## 2020-04-22 LAB
ALBUMIN SERPL BCP-MCNC: 4.3 G/DL (ref 3.5–5.7)
ALP SERPL-CCNC: 46 U/L (ref 40–150)
ALT SERPL W P-5'-P-CCNC: 16 U/L (ref 7–52)
ANION GAP SERPL CALCULATED.3IONS-SCNC: 6 MMOL/L (ref 4–13)
AST SERPL W P-5'-P-CCNC: 22 U/L (ref 13–39)
BILIRUB SERPL-MCNC: 0.5 MG/DL (ref 0.2–1)
BUN SERPL-MCNC: 14 MG/DL (ref 7–25)
CALCIUM SERPL-MCNC: 9.1 MG/DL (ref 8.6–10.5)
CHLORIDE SERPL-SCNC: 100 MMOL/L (ref 98–107)
CO2 SERPL-SCNC: 28 MMOL/L (ref 21–31)
CREAT SERPL-MCNC: 0.69 MG/DL (ref 0.6–1.2)
ERYTHROCYTE [DISTWIDTH] IN BLOOD BY AUTOMATED COUNT: 13.1 % (ref 11.5–14.5)
GFR SERPL CREATININE-BSD FRML MDRD: 98 ML/MIN/1.73SQ M
GLUCOSE SERPL-MCNC: 112 MG/DL (ref 65–99)
HCT VFR BLD AUTO: 43.3 % (ref 42–47)
HGB BLD-MCNC: 14.2 G/DL (ref 12–16)
LIPASE SERPL-CCNC: 29 U/L (ref 11–82)
LYMPHOCYTES # BLD AUTO: 0.58 THOUSAND/UL (ref 0.6–4.47)
LYMPHOCYTES # BLD AUTO: 6 % (ref 20–51)
MAGNESIUM SERPL-MCNC: 1.9 MG/DL (ref 1.9–2.7)
MCH RBC QN AUTO: 30.9 PG (ref 26–34)
MCHC RBC AUTO-ENTMCNC: 32.9 G/DL (ref 31–37)
MCV RBC AUTO: 94 FL (ref 81–99)
MONOCYTES # BLD AUTO: 0.19 THOUSAND/UL (ref 0–1.22)
MONOCYTES NFR BLD AUTO: 2 % (ref 4–12)
NEUTS BAND NFR BLD MANUAL: 8 % (ref 0–8)
NEUTS SEG # BLD: 8.92 THOUSAND/UL (ref 1.81–6.82)
NEUTS SEG NFR BLD AUTO: 84 % (ref 42–75)
PLATELET # BLD AUTO: 248 THOUSANDS/UL (ref 149–390)
PLATELET BLD QL SMEAR: ADEQUATE
PMV BLD AUTO: 7.1 FL (ref 8.6–11.7)
POTASSIUM SERPL-SCNC: 4 MMOL/L (ref 3.5–5.5)
PROT SERPL-MCNC: 6.4 G/DL (ref 6.4–8.9)
RBC # BLD AUTO: 4.61 MILLION/UL (ref 3.9–5.2)
RBC MORPH BLD: NORMAL
SODIUM SERPL-SCNC: 134 MMOL/L (ref 134–143)
TOTAL CELLS COUNTED SPEC: 100
WBC # BLD AUTO: 9.7 THOUSAND/UL (ref 4.8–10.8)

## 2020-04-22 PROCEDURE — 99283 EMERGENCY DEPT VISIT LOW MDM: CPT

## 2020-04-22 PROCEDURE — 36415 COLL VENOUS BLD VENIPUNCTURE: CPT | Performed by: PHYSICIAN ASSISTANT

## 2020-04-22 PROCEDURE — 96361 HYDRATE IV INFUSION ADD-ON: CPT

## 2020-04-22 PROCEDURE — 83690 ASSAY OF LIPASE: CPT | Performed by: PHYSICIAN ASSISTANT

## 2020-04-22 PROCEDURE — 96374 THER/PROPH/DIAG INJ IV PUSH: CPT

## 2020-04-22 PROCEDURE — 83735 ASSAY OF MAGNESIUM: CPT | Performed by: PHYSICIAN ASSISTANT

## 2020-04-22 PROCEDURE — 99284 EMERGENCY DEPT VISIT MOD MDM: CPT | Performed by: PHYSICIAN ASSISTANT

## 2020-04-22 PROCEDURE — 85027 COMPLETE CBC AUTOMATED: CPT | Performed by: PHYSICIAN ASSISTANT

## 2020-04-22 PROCEDURE — 96375 TX/PRO/DX INJ NEW DRUG ADDON: CPT

## 2020-04-22 PROCEDURE — 99213 OFFICE O/P EST LOW 20 MIN: CPT | Performed by: NURSE PRACTITIONER

## 2020-04-22 PROCEDURE — 85007 BL SMEAR W/DIFF WBC COUNT: CPT | Performed by: PHYSICIAN ASSISTANT

## 2020-04-22 PROCEDURE — 80053 COMPREHEN METABOLIC PANEL: CPT | Performed by: PHYSICIAN ASSISTANT

## 2020-04-22 RX ORDER — DIPHENHYDRAMINE HYDROCHLORIDE 50 MG/ML
50 INJECTION INTRAMUSCULAR; INTRAVENOUS ONCE
Status: COMPLETED | OUTPATIENT
Start: 2020-04-22 | End: 2020-04-22

## 2020-04-22 RX ORDER — ONDANSETRON 4 MG/1
4 TABLET, ORALLY DISINTEGRATING ORAL ONCE
Status: COMPLETED | OUTPATIENT
Start: 2020-04-22 | End: 2020-04-22

## 2020-04-22 RX ORDER — MELATONIN
2000 DAILY
COMMUNITY

## 2020-04-22 RX ORDER — VITAMIN B COMPLEX
1 CAPSULE ORAL DAILY
COMMUNITY

## 2020-04-22 RX ORDER — ONDANSETRON 2 MG/ML
4 INJECTION INTRAMUSCULAR; INTRAVENOUS ONCE
Status: COMPLETED | OUTPATIENT
Start: 2020-04-22 | End: 2020-04-22

## 2020-04-22 RX ORDER — KETOROLAC TROMETHAMINE 30 MG/ML
30 INJECTION, SOLUTION INTRAMUSCULAR; INTRAVENOUS ONCE
Status: COMPLETED | OUTPATIENT
Start: 2020-04-22 | End: 2020-04-22

## 2020-04-22 RX ADMIN — ONDANSETRON 4 MG: 4 TABLET, ORALLY DISINTEGRATING ORAL at 15:11

## 2020-04-22 RX ADMIN — KETOROLAC TROMETHAMINE 30 MG: 30 INJECTION, SOLUTION INTRAMUSCULAR; INTRAVENOUS at 16:51

## 2020-04-22 RX ADMIN — SODIUM CHLORIDE 1000 ML: 0.9 INJECTION, SOLUTION INTRAVENOUS at 16:51

## 2020-04-22 RX ADMIN — DIPHENHYDRAMINE HYDROCHLORIDE 50 MG: 50 INJECTION INTRAMUSCULAR; INTRAVENOUS at 16:52

## 2020-04-22 RX ADMIN — ONDANSETRON 4 MG: 2 INJECTION INTRAMUSCULAR; INTRAVENOUS at 16:53

## 2020-06-22 ENCOUNTER — OFFICE VISIT (OUTPATIENT)
Dept: INTERNAL MEDICINE CLINIC | Facility: CLINIC | Age: 55
End: 2020-06-22
Payer: COMMERCIAL

## 2020-06-22 VITALS
BODY MASS INDEX: 23.98 KG/M2 | SYSTOLIC BLOOD PRESSURE: 110 MMHG | HEIGHT: 61 IN | TEMPERATURE: 98.7 F | DIASTOLIC BLOOD PRESSURE: 62 MMHG | WEIGHT: 127 LBS | HEART RATE: 90 BPM | RESPIRATION RATE: 14 BRPM | OXYGEN SATURATION: 98 %

## 2020-06-22 DIAGNOSIS — E55.9 VITAMIN D DEFICIENCY: ICD-10-CM

## 2020-06-22 DIAGNOSIS — Z11.59 ENCOUNTER FOR HEPATITIS C SCREENING TEST FOR LOW RISK PATIENT: ICD-10-CM

## 2020-06-22 DIAGNOSIS — R79.89 ABNORMAL CBC: ICD-10-CM

## 2020-06-22 DIAGNOSIS — Z13.31 DEPRESSION SCREENING NEGATIVE: ICD-10-CM

## 2020-06-22 DIAGNOSIS — Z23 ENCOUNTER FOR VACCINATION: ICD-10-CM

## 2020-06-22 DIAGNOSIS — R11.2 NAUSEA AND VOMITING, INTRACTABILITY OF VOMITING NOT SPECIFIED, UNSPECIFIED VOMITING TYPE: ICD-10-CM

## 2020-06-22 DIAGNOSIS — Z11.4 SCREENING FOR HIV (HUMAN IMMUNODEFICIENCY VIRUS): ICD-10-CM

## 2020-06-22 DIAGNOSIS — Z13.220 SCREENING FOR HYPERLIPIDEMIA: ICD-10-CM

## 2020-06-22 DIAGNOSIS — I47.1 SVT (SUPRAVENTRICULAR TACHYCARDIA) (HCC): Chronic | ICD-10-CM

## 2020-06-22 DIAGNOSIS — Z12.11 SCREEN FOR COLON CANCER: ICD-10-CM

## 2020-06-22 DIAGNOSIS — Z00.00 ENCOUNTER FOR PREVENTATIVE ADULT HEALTH CARE EXAMINATION: Primary | ICD-10-CM

## 2020-06-22 DIAGNOSIS — R10.9 ABDOMINAL PAIN, UNSPECIFIED ABDOMINAL LOCATION: ICD-10-CM

## 2020-06-22 DIAGNOSIS — R53.83 OTHER FATIGUE: ICD-10-CM

## 2020-06-22 PROCEDURE — 3008F BODY MASS INDEX DOCD: CPT | Performed by: NURSE PRACTITIONER

## 2020-06-22 PROCEDURE — 99204 OFFICE O/P NEW MOD 45 MIN: CPT | Performed by: NURSE PRACTITIONER

## 2020-06-22 PROCEDURE — 90715 TDAP VACCINE 7 YRS/> IM: CPT | Performed by: NURSE PRACTITIONER

## 2020-06-22 PROCEDURE — 1036F TOBACCO NON-USER: CPT | Performed by: NURSE PRACTITIONER

## 2020-06-22 PROCEDURE — 90471 IMMUNIZATION ADMIN: CPT | Performed by: NURSE PRACTITIONER

## 2020-06-22 RX ORDER — GARLIC 180 MG
TABLET, DELAYED RELEASE (ENTERIC COATED) ORAL
COMMUNITY

## 2020-06-23 ENCOUNTER — APPOINTMENT (OUTPATIENT)
Dept: LAB | Facility: HOSPITAL | Age: 55
End: 2020-06-23
Payer: COMMERCIAL

## 2020-06-23 DIAGNOSIS — E55.9 VITAMIN D DEFICIENCY: ICD-10-CM

## 2020-06-23 DIAGNOSIS — R79.89 ABNORMAL CBC: ICD-10-CM

## 2020-06-23 DIAGNOSIS — Z11.4 SCREENING FOR HIV (HUMAN IMMUNODEFICIENCY VIRUS): ICD-10-CM

## 2020-06-23 DIAGNOSIS — R53.83 OTHER FATIGUE: ICD-10-CM

## 2020-06-23 DIAGNOSIS — Z11.59 ENCOUNTER FOR HEPATITIS C SCREENING TEST FOR LOW RISK PATIENT: ICD-10-CM

## 2020-06-23 DIAGNOSIS — Z13.220 SCREENING FOR HYPERLIPIDEMIA: ICD-10-CM

## 2020-06-23 LAB
25(OH)D3 SERPL-MCNC: 43.9 NG/ML (ref 30–100)
BASOPHILS # BLD AUTO: 0.1 THOUSANDS/ΜL (ref 0–0.1)
BASOPHILS NFR BLD AUTO: 1 % (ref 0–2)
CHOLEST SERPL-MCNC: 166 MG/DL (ref 0–200)
EOSINOPHIL # BLD AUTO: 0.2 THOUSAND/ΜL (ref 0–0.61)
EOSINOPHIL NFR BLD AUTO: 3 % (ref 0–5)
ERYTHROCYTE [DISTWIDTH] IN BLOOD BY AUTOMATED COUNT: 13.4 % (ref 11.5–14.5)
HCT VFR BLD AUTO: 42.2 % (ref 42–47)
HCV AB SER QL: NORMAL
HDLC SERPL-MCNC: 77 MG/DL
HGB BLD-MCNC: 14.3 G/DL (ref 12–16)
LDLC SERPL CALC-MCNC: 80 MG/DL (ref 0–100)
LYMPHOCYTES # BLD AUTO: 1.7 THOUSANDS/ΜL (ref 0.6–4.47)
LYMPHOCYTES NFR BLD AUTO: 26 % (ref 21–51)
MCH RBC QN AUTO: 31.1 PG (ref 26–34)
MCHC RBC AUTO-ENTMCNC: 33.9 G/DL (ref 31–37)
MCV RBC AUTO: 92 FL (ref 81–99)
MONOCYTES # BLD AUTO: 0.6 THOUSAND/ΜL (ref 0.17–1.22)
MONOCYTES NFR BLD AUTO: 10 % (ref 2–12)
NEUTROPHILS # BLD AUTO: 4 THOUSANDS/ΜL (ref 1.4–6.5)
NEUTS SEG NFR BLD AUTO: 61 % (ref 42–75)
NONHDLC SERPL-MCNC: 89 MG/DL
PLATELET # BLD AUTO: 238 THOUSANDS/UL (ref 149–390)
PMV BLD AUTO: 7.3 FL (ref 8.6–11.7)
RBC # BLD AUTO: 4.61 MILLION/UL (ref 3.9–5.2)
TRIGL SERPL-MCNC: 45 MG/DL (ref 44–166)
TSH SERPL DL<=0.05 MIU/L-ACNC: 1.79 UIU/ML (ref 0.45–5.33)
WBC # BLD AUTO: 6.5 THOUSAND/UL (ref 4.8–10.8)

## 2020-06-23 PROCEDURE — 86803 HEPATITIS C AB TEST: CPT

## 2020-06-23 PROCEDURE — 87389 HIV-1 AG W/HIV-1&-2 AB AG IA: CPT

## 2020-06-23 PROCEDURE — 36415 COLL VENOUS BLD VENIPUNCTURE: CPT

## 2020-06-23 PROCEDURE — 84443 ASSAY THYROID STIM HORMONE: CPT

## 2020-06-23 PROCEDURE — 85025 COMPLETE CBC W/AUTO DIFF WBC: CPT

## 2020-06-23 PROCEDURE — 82306 VITAMIN D 25 HYDROXY: CPT

## 2020-06-23 PROCEDURE — 80061 LIPID PANEL: CPT

## 2020-06-24 LAB — HIV 1+2 AB+HIV1 P24 AG SERPL QL IA: NORMAL

## 2020-06-30 ENCOUNTER — HOSPITAL ENCOUNTER (OUTPATIENT)
Dept: ULTRASOUND IMAGING | Facility: HOSPITAL | Age: 55
Discharge: HOME | End: 2020-06-30
Payer: COMMERCIAL

## 2020-06-30 DIAGNOSIS — R11.2 NAUSEA AND VOMITING, INTRACTABILITY OF VOMITING NOT SPECIFIED, UNSPECIFIED VOMITING TYPE: ICD-10-CM

## 2020-06-30 PROCEDURE — 76705 ECHO EXAM OF ABDOMEN: CPT

## 2020-07-27 ENCOUNTER — OFFICE VISIT (OUTPATIENT)
Dept: INTERNAL MEDICINE CLINIC | Facility: CLINIC | Age: 55
End: 2020-07-27
Payer: COMMERCIAL

## 2020-07-27 VITALS
HEART RATE: 90 BPM | OXYGEN SATURATION: 98 % | HEIGHT: 61 IN | SYSTOLIC BLOOD PRESSURE: 112 MMHG | DIASTOLIC BLOOD PRESSURE: 70 MMHG | BODY MASS INDEX: 23.22 KG/M2 | WEIGHT: 123 LBS | TEMPERATURE: 97.9 F | RESPIRATION RATE: 16 BRPM

## 2020-07-27 DIAGNOSIS — R53.83 OTHER FATIGUE: Primary | ICD-10-CM

## 2020-07-27 DIAGNOSIS — E55.9 VITAMIN D DEFICIENCY: ICD-10-CM

## 2020-07-27 PROCEDURE — 3008F BODY MASS INDEX DOCD: CPT | Performed by: NURSE PRACTITIONER

## 2020-07-27 PROCEDURE — 99396 PREV VISIT EST AGE 40-64: CPT | Performed by: NURSE PRACTITIONER

## 2020-07-27 NOTE — PROGRESS NOTES
Assessment/Plan:    No problem-specific Assessment & Plan notes found for this encounter  Diagnoses and all orders for this visit:    Other fatigue  Comments:  Labs WNL    Vitamin D deficiency  Comments:  Labs WNL          Subjective:      Patient ID: Nikolay Lennon is a 54 y o  female  54 yr old female arrives for annual physical exam and lab review, her only complaint today is excess fatigue, labs look WNL while pt reports working extra hard with 3 people out of work  in her current job- the other staff is picking up the difference in work  Otherwise pt offers no other complaints      The following portions of the patient's history were reviewed and updated as appropriate: She  has a past medical history of Allergic  She   Patient Active Problem List    Diagnosis Date Noted    Other fatigue 2020    Vitamin D deficiency 2020    Depression screening negative 2020    BMI 23 0-23 9, adult 2020    SVT (supraventricular tachycardia) ([facility] [address] ) 2017     She  has a past surgical history that includes Tubal ligation;  section; and Cardiac electrophysiology study and ablation  Her family history includes No Known Problems in her daughter, father, maternal aunt, maternal grandmother, mother, paternal aunt, paternal grandmother, sister, sister, sister, and sister  She  reports that she has never smoked  She has never used smokeless tobacco  She reports that she drinks about 1 0 standard drinks of alcohol per week  She reports that she does not use drugs    Current Outpatient Medications   Medication Sig Dispense Refill    b complex vitamins capsule Take 1 capsule by mouth daily      Black Cohosh 40 MG CAPS Take by mouth      cholecalciferol (VITAMIN D3) 1,000 units tablet Take 2,000 Units by mouth daily       EVENING PRIMROSE OIL PO Take by mouth      fluticasone (FLONASE) 50 mcg/act nasal spray 1 spray into each nostril daily 1 Bottle 1     No current facility-administered medications for this visit  Current Outpatient Medications on File Prior to Visit   Medication Sig    b complex vitamins capsule Take 1 capsule by mouth daily    Black Cohosh 40 MG CAPS Take by mouth    cholecalciferol (VITAMIN D3) 1,000 units tablet Take 2,000 Units by mouth daily     EVENING PRIMROSE OIL PO Take by mouth    fluticasone (FLONASE) 50 mcg/act nasal spray 1 spray into each nostril daily     No current facility-administered medications on file prior to visit  She is allergic to penicillins       Review of Systems   Constitutional: Positive for fatigue  HENT: Negative  Eyes: Negative  Respiratory: Negative  Cardiovascular: Negative  Gastrointestinal: Negative  Endocrine: Negative  Genitourinary: Negative  Musculoskeletal: Negative  Skin: Negative  Allergic/Immunologic: Negative  Neurological: Negative  Hematological: Negative  Psychiatric/Behavioral: Negative  Objective:      /70   Pulse 90   Temp 97 9 °F (36 6 °C) (Tympanic)   Resp 16   Ht 5' 1" (1 549 m)   Wt 55 8 kg (123 lb)   SpO2 98%   BMI 23 24 kg/m²          Physical Exam   Constitutional: She is oriented to person, place, and time  She appears well-developed and well-nourished  HENT:   Head: Normocephalic  Eyes: Pupils are equal, round, and reactive to light  Neck: Normal range of motion  Cardiovascular: Normal rate and regular rhythm  Pulmonary/Chest: Effort normal and breath sounds normal    Abdominal: Soft  Bowel sounds are normal    Musculoskeletal: Normal range of motion  Neurological: She is alert and oriented to person, place, and time  Skin: Skin is warm and dry  Psychiatric: She has a normal mood and affect  Her behavior is normal  Judgment and thought content normal    Nursing note and vitals reviewed

## 2020-12-23 ENCOUNTER — IMMUNIZATIONS (OUTPATIENT)
Dept: FAMILY MEDICINE CLINIC | Facility: HOSPITAL | Age: 55
End: 2020-12-23
Payer: COMMERCIAL

## 2020-12-23 DIAGNOSIS — Z23 ENCOUNTER FOR IMMUNIZATION: ICD-10-CM

## 2020-12-23 PROCEDURE — 91301 SARS-COV-2 / COVID-19 MRNA VACCINE (MODERNA) 100 MCG: CPT | Performed by: STUDENT IN AN ORGANIZED HEALTH CARE EDUCATION/TRAINING PROGRAM

## 2020-12-23 PROCEDURE — 0011A SARS-COV-2 / COVID-19 MRNA VACCINE (MODERNA) 100 MCG: CPT | Performed by: STUDENT IN AN ORGANIZED HEALTH CARE EDUCATION/TRAINING PROGRAM

## 2021-01-19 ENCOUNTER — IMMUNIZATIONS (OUTPATIENT)
Dept: FAMILY MEDICINE CLINIC | Facility: HOSPITAL | Age: 56
End: 2021-01-19

## 2021-01-19 DIAGNOSIS — Z23 ENCOUNTER FOR IMMUNIZATION: Primary | ICD-10-CM

## 2021-01-19 PROCEDURE — 91301 SARS-COV-2 / COVID-19 MRNA VACCINE (MODERNA) 100 MCG: CPT

## 2021-01-19 PROCEDURE — 0012A SARS-COV-2 / COVID-19 MRNA VACCINE (MODERNA) 100 MCG: CPT

## 2021-04-02 ENCOUNTER — TRANSCRIBE ORDERS (OUTPATIENT)
Dept: ADMINISTRATIVE | Facility: HOSPITAL | Age: 56
End: 2021-04-02

## 2021-04-02 DIAGNOSIS — Z12.31 ENCOUNTER FOR SCREENING MAMMOGRAM FOR MALIGNANT NEOPLASM OF BREAST: Primary | ICD-10-CM

## 2021-04-12 ENCOUNTER — HOSPITAL ENCOUNTER (OUTPATIENT)
Dept: MAMMOGRAPHY | Facility: HOSPITAL | Age: 56
Discharge: HOME | End: 2021-04-12
Attending: SPECIALIST
Payer: COMMERCIAL

## 2021-04-12 VITALS — WEIGHT: 123 LBS | BODY MASS INDEX: 23.22 KG/M2 | HEIGHT: 61 IN

## 2021-04-12 DIAGNOSIS — Z12.31 ENCOUNTER FOR SCREENING MAMMOGRAM FOR MALIGNANT NEOPLASM OF BREAST: ICD-10-CM

## 2021-04-12 PROCEDURE — 77063 BREAST TOMOSYNTHESIS BI: CPT

## 2021-04-12 PROCEDURE — 77067 SCR MAMMO BI INCL CAD: CPT

## 2021-04-21 ENCOUNTER — HOSPITAL ENCOUNTER (EMERGENCY)
Facility: HOSPITAL | Age: 56
Discharge: HOME | End: 2021-04-21
Attending: EMERGENCY MEDICINE
Payer: COMMERCIAL

## 2021-04-21 ENCOUNTER — APPOINTMENT (EMERGENCY)
Dept: RADIOLOGY | Facility: HOSPITAL | Age: 56
End: 2021-04-21
Payer: COMMERCIAL

## 2021-04-21 VITALS
WEIGHT: 128 LBS | RESPIRATION RATE: 16 BRPM | HEART RATE: 80 BPM | DIASTOLIC BLOOD PRESSURE: 68 MMHG | TEMPERATURE: 97.6 F | BODY MASS INDEX: 24.19 KG/M2 | SYSTOLIC BLOOD PRESSURE: 120 MMHG | OXYGEN SATURATION: 100 %

## 2021-04-21 DIAGNOSIS — M25.512 ACUTE PAIN OF LEFT SHOULDER: Primary | ICD-10-CM

## 2021-04-21 PROCEDURE — 73030 X-RAY EXAM OF SHOULDER: CPT

## 2021-04-21 PROCEDURE — 96372 THER/PROPH/DIAG INJ SC/IM: CPT

## 2021-04-21 PROCEDURE — 99284 EMERGENCY DEPT VISIT MOD MDM: CPT | Performed by: EMERGENCY MEDICINE

## 2021-04-21 PROCEDURE — 99283 EMERGENCY DEPT VISIT LOW MDM: CPT

## 2021-04-21 RX ORDER — AMOXICILLIN 250 MG
1 CAPSULE ORAL DAILY
Qty: 20 TABLET | Refills: 0 | Status: SHIPPED | OUTPATIENT
Start: 2021-04-21 | End: 2021-05-03

## 2021-04-21 RX ORDER — METHYLPREDNISOLONE SODIUM SUCCINATE 125 MG/2ML
60 INJECTION, POWDER, LYOPHILIZED, FOR SOLUTION INTRAMUSCULAR; INTRAVENOUS ONCE
Status: COMPLETED | OUTPATIENT
Start: 2021-04-21 | End: 2021-04-21

## 2021-04-21 RX ORDER — HYDROCODONE BITARTRATE AND ACETAMINOPHEN 5; 325 MG/1; MG/1
1 TABLET ORAL ONCE
Status: COMPLETED | OUTPATIENT
Start: 2021-04-21 | End: 2021-04-21

## 2021-04-21 RX ORDER — HYDROCODONE BITARTRATE AND ACETAMINOPHEN 5; 325 MG/1; MG/1
1 TABLET ORAL EVERY 6 HOURS PRN
Qty: 12 TABLET | Refills: 0 | Status: SHIPPED | OUTPATIENT
Start: 2021-04-21 | End: 2021-05-03

## 2021-04-21 RX ADMIN — HYDROCODONE BITARTRATE AND ACETAMINOPHEN 1 TABLET: 5; 325 TABLET ORAL at 04:19

## 2021-04-21 RX ADMIN — METHYLPREDNISOLONE SODIUM SUCCINATE 60 MG: 125 INJECTION, POWDER, FOR SOLUTION INTRAMUSCULAR; INTRAVENOUS at 04:20

## 2021-04-21 NOTE — Clinical Note
Laya Monroe was seen and treated in our emergency department on 4/21/2021  Diagnosis:     Ayad Aguero  may return to work on return date  She may return on this date: 04/26/2021         If you have any questions or concerns, please don't hesitate to call        Milena Montanez MD    ______________________________           _______________          _______________  Hospital Representative                              Date                                Time

## 2021-04-21 NOTE — DISCHARGE INSTRUCTIONS
RETURN IF WORSE IN ANY WAY, OR NEW AND CONCERNING SYMPTOMS SIGNS OR SYMPTOMS:  INCREASED PAIN, INCREASED SWELLING AT THE SITE OF INJURY    APPLY ICE AS NEEDED  YOU CAN GIVE TYLENOL AND MOTRIN, AS NEEDED, AND AS DIRECTED       PLEASE CALL ORTHOPEDIC SPECIALIST IN THE MORNING TO SET UP FOLLOW UP       USE EXTREME CAUTION WHILE TAKING NARCOTICS FOR PAIN  ONLY TAKE FOR ACUTE PAIN  NEVER TAKE WITH OTHER SEDATIVE MEDICATIONS OR SUBSTANCES, SUCH AS SLEEPING MEDICATIONS OR ALCOHOL OR OTHER SUCH SUBSTANCES  YOU MAY NEED TO TAKE LAXATIVES WHILE TAKING THIS MEDICATION  PLEASE DISCUSS THE ABOVE WITH YOUR FAMILY DOCTOR

## 2021-04-21 NOTE — ED PROVIDER NOTES
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      [facility] or sources of pain:    NO BACK PAIN  NO PAIN IN THE OTHER EXTREMITIES or Other joints  SHE HAS EQUAL AND NORMAL  STRENGTH      NO WOUNDS  TETANUS UP-TO-DATE      INTERVENTIONS:  Pt took Advil prior to arrival      Patient has no other complaints:  She denies chest pain or shortness of breath  Denies abdominal pain  Denies back pain  Denies headache or fevers or chills  Denies nausea vomiting          History provided by:  Patient  Arm Pain  Location:  Left anterior shoulder pain  Severity:  Moderate  Onset quality:  Gradual  Progression:  Worsening  Associated symptoms: no abdominal pain, no chest pain, no congestion, no cough, no diarrhea, no ear pain, no fatigue, no fever, no headaches, no loss of consciousness, no myalgias, no nausea, no rash, no shortness of breath, no vomiting and no wheezing        Prior to Admission Medications   Prescriptions Last Dose Informant Patient Reported? Taking?    Black Cohosh 40 MG CAPS   Yes No   Sig: Take by mouth   EVENING PRIMROSE OIL PO   Yes Yes   Sig: Take by mouth   b complex vitamins capsule   Yes Yes   Sig: Take 1 capsule by mouth daily   cholecalciferol (VITAMIN D3) 1,000 units tablet  Self Yes Yes   Sig: Take 2,000 Units by mouth daily    fluticasone (FLONASE) 50 mcg/act nasal spray   No No   Si spray into each nostril daily      Facility-Administered Medications: None       Past Medical History:   Diagnosis Date    Allergic        Past Surgical History:   Procedure Laterality Date    CARDIAC ELECTROPHYSIOLOGY STUDY AND ABLATION       SECTION      TUBAL LIGATION         Family History   Problem Relation Age of Onset    No Known Problems Mother     No Known Problems Father     No Known Problems Sister     No Known Problems Daughter     No Known Problems Maternal Grandmother     No Known Problems Paternal Grandmother     No Known Problems Sister     No Known Problems Sister     No Known Problems Sister     No Known Problems Maternal Aunt     No Known Problems Paternal Aunt      I have reviewed and agree with the history as documented  E-Cigarette/Vaping    E-Cigarette Use Never User      E-Cigarette/Vaping Substances     Social History     Tobacco Use    Smoking status: Never Smoker    Smokeless tobacco: Never Used   Substance Use Topics    Alcohol use: Yes     Alcohol/week: 1 0 standard drinks     Types: 1 Glasses of wine per week     Comment: occasional    Drug use: No       Review of Systems   Constitutional: Negative for chills, diaphoresis, fatigue and fever  HENT: Negative for congestion and ear pain  Respiratory: Negative for cough, shortness of breath, wheezing and stridor  Cardiovascular: Negative for chest pain, palpitations and leg swelling  Gastrointestinal: Negative for abdominal pain, blood in stool, diarrhea, nausea and vomiting  Genitourinary: Negative for difficulty urinating, dysuria, flank pain and frequency  Musculoskeletal: Negative for back pain, gait problem, joint swelling, myalgias, neck pain and neck stiffness  Left shoulder pain   Skin: Negative for rash and wound  Neurological: Negative for dizziness, loss of consciousness, light-headedness and headaches  All other systems reviewed and are negative  Physical Exam  Physical Exam  Constitutional:       General: She is not in acute distress  Appearance: She is well-developed  She is not ill-appearing, toxic-appearing or diaphoretic  HENT:      Head: Normocephalic and atraumatic  Nose: Nose normal       Mouth/Throat:      Pharynx: No oropharyngeal exudate  Eyes:      General: No scleral icterus  Right eye: No discharge  Left eye: No discharge  Conjunctiva/sclera: Conjunctivae normal       Pupils: Pupils are equal, round, and reactive to light  Neck:      Musculoskeletal: Normal range of motion and neck supple  No neck rigidity or muscular tenderness  Vascular: No JVD  Trachea: No tracheal deviation  Cardiovascular:      Rate and Rhythm: Normal rate and regular rhythm  Heart sounds: Normal heart sounds  No murmur  No friction rub  No gallop  Pulmonary:      Effort: Pulmonary effort is normal  No respiratory distress  Breath sounds: Normal breath sounds  No stridor  No wheezing, rhonchi or rales  Chest:      Chest wall: No tenderness  Abdominal:      General: Bowel sounds are normal  There is no distension  Palpations: Abdomen is soft  There is no mass  Tenderness: There is no abdominal tenderness  There is no right CVA tenderness, left CVA tenderness, guarding or rebound  Hernia: No hernia is present  Musculoskeletal: Normal range of motion  General: Tenderness (Reproducible tenderness to palpation in the anterior lateral aspect of the left shoulder  No signs of dislocation  No increased tenderness or redness) present  No swelling, deformity or signs of injury  Right lower leg: No edema  Left lower leg: No edema  Lymphadenopathy:      Cervical: No cervical adenopathy  Skin:     General: Skin is warm  Capillary Refill: Capillary refill takes less than 2 seconds  Coloration: Skin is not jaundiced or pale  Findings: No bruising, erythema, lesion or rash  Neurological:      General: No focal deficit present  Mental Status: She is alert and oriented to person, place, and time  Mental status is at baseline  Cranial Nerves: No cranial nerve deficit        Sensory: No sensory deficit  Motor: No weakness or abnormal muscle tone  Coordination: Coordination normal       Gait: Gait normal    Psychiatric:         Behavior: Behavior normal          Thought Content: Thought content normal          Judgment: Judgment normal          Vital Signs  ED Triage Vitals [04/21/21 0403]   Temperature Pulse Respirations Blood Pressure SpO2   97 6 °F (36 4 °C) 83 16 120/70 100 %      Temp src Heart Rate Source Patient Position - Orthostatic VS BP Location FiO2 (%)   -- -- -- -- --      Pain Score       Worst Possible Pain           Vitals:    04/21/21 0403   BP: 120/70   Pulse: 83         Visual Acuity      ED Medications  Medications   methylPREDNISolone sodium succinate (Solu-MEDROL) injection 60 mg (60 mg Intramuscular Given 4/21/21 0420)   HYDROcodone-acetaminophen (NORCO) 5-325 mg per tablet 1 tablet (1 tablet Oral Given 4/21/21 0419)       Diagnostic Studies  Results Reviewed     None                 XR shoulder 2+ views LEFT   ED Interpretation by Merissa Anderson MD (04/21 5767)   CALCIFIC TENDONITIS  NO FX                  Procedures  Procedures         ED Course  ED Course as of Apr 21 0522   Wed Apr 21, 2021   0516 PATIENT IS FEELING BETTER AFTER STEROID SHOTS OF VICODIN  SHE WANTS TO MANAGEMENT  SHE UNDERSTANDS DIAGNOSIS  SHE WILL FOLLOW-UP WITH ORTHOPEDICS                                SBIRT 20yo+      Most Recent Value   SBIRT (24 yo +)   In order to provide better care to our patients, we are screening all of our patients for alcohol and drug use  Would it be okay to ask you these screening questions? Yes Filed at: 04/21/2021 0410   Initial Alcohol Screen: US AUDIT-C    1  How often do you have a drink containing alcohol? 3 Filed at: 04/21/2021 0410   2  How many drinks containing alcohol do you have on a typical day you are drinking? 0 Filed at: 04/21/2021 0410   3a  Male UNDER 65: How often do you have five or more drinks on one occasion?   0 Filed at: 04/21/2021 0410 3b  FEMALE Any Age, or MALE 65+: How often do you have 4 or more drinks on one occassion? 0 Filed at: 04/21/2021 0410   Audit-C Score  3 Filed at: 04/21/2021 0410   SPIKE: How many times in the past year have you    Used an illegal drug or used a prescription medication for non-medical reasons? Never Filed at: 04/21/2021 0410                    MDM    Disposition  Final diagnoses:   Acute pain of left shoulder     Time reflects when diagnosis was documented in both MDM as applicable and the Disposition within this note     Time User Action Codes Description Comment    4/21/2021  5:03 AM Genny Monroe Add [G70 [id number]] Acute pain of left shoulder       ED Disposition     ED Disposition Condition Date/Time Comment    Discharge Stable Wed Apr 21, 2021  5:13 AM Gabe Ornelas discharge to home/self care  Follow-up Information     Follow up With Specialties Details Why Contact Info    Marva Mancuso, [address] [facility], Nurse Practitioner Call today  [address] [facility] WITH [facility] [address] [place] [address] Cecilia Ha DO Orthopedic Surgery Call today  [address] [facility] [address]  [address] [place] [address]            Patient's Medications   Discharge Prescriptions    HYDROCODONE-ACETAMINOPHEN (NORCO) 5-325 MG PER TABLET    Take 1 tablet by mouth every 6 (six) hours as needed for pain for up to 12 dosesMax Daily Amount: 4 tablets       Start Date: 4/21/2021 End Date: --       Order Dose: 1 tablet       Quantity: 12 tablet    Refills: 0    SENNA-DOCUSATE SODIUM (SENOKOT S) 8 6-50 MG PER TABLET    Take 1 tablet by mouth daily       Start Date: 4/21/2021 End Date: --       Order Dose: 1 tablet       Quantity: 20 tablet    Refills: 0     No discharge procedures on file      PDMP Review     None          ED Provider  Electronically Signed by           Ananya Hollis MD  04/21/21 2276

## 2021-04-21 NOTE — Clinical Note
Melisa Metcalf was seen and treated in our emergency department on 4/21/2021  Diagnosis:     Benji Sewell  may return to work on return date  She may return on this date: 04/26/2021         If you have any questions or concerns, please don't hesitate to call        Frank Yu MD    ______________________________           _______________          _______________  Hospital Representative                              Date                                Time

## 2021-04-22 ENCOUNTER — APPOINTMENT (EMERGENCY)
Dept: CT IMAGING | Facility: HOSPITAL | Age: 56
End: 2021-04-22
Payer: COMMERCIAL

## 2021-04-22 ENCOUNTER — HOSPITAL ENCOUNTER (EMERGENCY)
Facility: HOSPITAL | Age: 56
Discharge: HOME | End: 2021-04-22
Attending: EMERGENCY MEDICINE
Payer: COMMERCIAL

## 2021-04-22 VITALS
DIASTOLIC BLOOD PRESSURE: 71 MMHG | HEART RATE: 79 BPM | OXYGEN SATURATION: 100 % | TEMPERATURE: 98.1 F | SYSTOLIC BLOOD PRESSURE: 131 MMHG | RESPIRATION RATE: 18 BRPM

## 2021-04-22 DIAGNOSIS — M25.512 ACUTE PAIN OF LEFT SHOULDER: Primary | ICD-10-CM

## 2021-04-22 LAB
ANION GAP SERPL CALCULATED.3IONS-SCNC: 7 MMOL/L (ref 4–13)
BASOPHILS # BLD AUTO: 0.1 THOUSANDS/ΜL (ref 0–0.1)
BASOPHILS NFR BLD AUTO: 1 % (ref 0–2)
BUN SERPL-MCNC: 21 MG/DL (ref 7–25)
CALCIUM SERPL-MCNC: 8.4 MG/DL (ref 8.6–10.5)
CHLORIDE SERPL-SCNC: 105 MMOL/L (ref 98–107)
CO2 SERPL-SCNC: 29 MMOL/L (ref 21–31)
CREAT SERPL-MCNC: 0.79 MG/DL (ref 0.6–1.2)
CRP SERPL QL: <5 MG/L
D DIMER PPP FEU-MCNC: 0.34 UG/ML FEU
EOSINOPHIL # BLD AUTO: 0 THOUSAND/ΜL (ref 0–0.61)
EOSINOPHIL NFR BLD AUTO: 0 % (ref 0–5)
ERYTHROCYTE [DISTWIDTH] IN BLOOD BY AUTOMATED COUNT: 13.3 % (ref 11.5–14.5)
ERYTHROCYTE [SEDIMENTATION RATE] IN BLOOD: 4 MM/HOUR (ref 0–29)
GFR SERPL CREATININE-BSD FRML MDRD: 84 ML/MIN/1.73SQ M
GLUCOSE SERPL-MCNC: 101 MG/DL (ref 65–99)
HCT VFR BLD AUTO: 38.6 % (ref 42–47)
HGB BLD-MCNC: 12.9 G/DL (ref 12–16)
LYMPHOCYTES # BLD AUTO: 2.6 THOUSANDS/ΜL (ref 0.6–4.47)
LYMPHOCYTES NFR BLD AUTO: 25 % (ref 21–51)
MCH RBC QN AUTO: 30.9 PG (ref 26–34)
MCHC RBC AUTO-ENTMCNC: 33.5 G/DL (ref 31–37)
MCV RBC AUTO: 92 FL (ref 81–99)
MONOCYTES # BLD AUTO: 0.8 THOUSAND/ΜL (ref 0.17–1.22)
MONOCYTES NFR BLD AUTO: 7 % (ref 2–12)
NEUTROPHILS # BLD AUTO: 7.2 THOUSANDS/ΜL (ref 1.4–6.5)
NEUTS SEG NFR BLD AUTO: 67 % (ref 42–75)
PLATELET # BLD AUTO: 239 THOUSANDS/UL (ref 149–390)
PMV BLD AUTO: 7.7 FL (ref 8.6–11.7)
POTASSIUM SERPL-SCNC: 3.7 MMOL/L (ref 3.5–5.5)
RBC # BLD AUTO: 4.18 MILLION/UL (ref 3.9–5.2)
SODIUM SERPL-SCNC: 141 MMOL/L (ref 134–143)
WBC # BLD AUTO: 10.8 THOUSAND/UL (ref 4.8–10.8)

## 2021-04-22 PROCEDURE — 86140 C-REACTIVE PROTEIN: CPT | Performed by: EMERGENCY MEDICINE

## 2021-04-22 PROCEDURE — 80048 BASIC METABOLIC PNL TOTAL CA: CPT | Performed by: EMERGENCY MEDICINE

## 2021-04-22 PROCEDURE — 85379 FIBRIN DEGRADATION QUANT: CPT | Performed by: EMERGENCY MEDICINE

## 2021-04-22 PROCEDURE — 85025 COMPLETE CBC W/AUTO DIFF WBC: CPT | Performed by: EMERGENCY MEDICINE

## 2021-04-22 PROCEDURE — 36415 COLL VENOUS BLD VENIPUNCTURE: CPT | Performed by: EMERGENCY MEDICINE

## 2021-04-22 PROCEDURE — 85652 RBC SED RATE AUTOMATED: CPT | Performed by: EMERGENCY MEDICINE

## 2021-04-22 PROCEDURE — 99284 EMERGENCY DEPT VISIT MOD MDM: CPT | Performed by: EMERGENCY MEDICINE

## 2021-04-22 PROCEDURE — G1004 CDSM NDSC: HCPCS

## 2021-04-22 PROCEDURE — 73201 CT UPPER EXTREMITY W/DYE: CPT

## 2021-04-22 PROCEDURE — 96375 TX/PRO/DX INJ NEW DRUG ADDON: CPT

## 2021-04-22 PROCEDURE — 99284 EMERGENCY DEPT VISIT MOD MDM: CPT

## 2021-04-22 PROCEDURE — 96374 THER/PROPH/DIAG INJ IV PUSH: CPT

## 2021-04-22 RX ORDER — METHYLPREDNISOLONE SODIUM SUCCINATE 125 MG/2ML
125 INJECTION, POWDER, LYOPHILIZED, FOR SOLUTION INTRAMUSCULAR; INTRAVENOUS ONCE
Status: COMPLETED | OUTPATIENT
Start: 2021-04-22 | End: 2021-04-22

## 2021-04-22 RX ORDER — NAPROXEN 375 MG/1
375 TABLET ORAL 2 TIMES DAILY WITH MEALS
Qty: 20 TABLET | Refills: 0 | Status: SHIPPED | OUTPATIENT
Start: 2021-04-22 | End: 2021-05-03

## 2021-04-22 RX ORDER — KETOROLAC TROMETHAMINE 30 MG/ML
15 INJECTION, SOLUTION INTRAMUSCULAR; INTRAVENOUS ONCE
Status: COMPLETED | OUTPATIENT
Start: 2021-04-22 | End: 2021-04-22

## 2021-04-22 RX ADMIN — IOHEXOL 100 ML: 350 INJECTION, SOLUTION INTRAVENOUS at 21:14

## 2021-04-22 RX ADMIN — METHYLPREDNISOLONE SODIUM SUCCINATE 125 MG: 125 INJECTION, POWDER, FOR SOLUTION INTRAMUSCULAR; INTRAVENOUS at 20:31

## 2021-04-22 RX ADMIN — KETOROLAC TROMETHAMINE 15 MG: 30 INJECTION, SOLUTION INTRAMUSCULAR; INTRAVENOUS at 20:31

## 2021-04-23 NOTE — ED PROVIDER NOTES
History  Chief Complaint   Patient presents with    Arm Pain     revisit left arm pain      This is a 78-year-old female complains of severe left shoulder pain  She states that it started acutely 3 days ago  There is no trauma  She has not previously had issues with the arm  No numbness  She has rates the pain as severe and feels that there is swelling beneath it  Patient was seen in our emergency department where she is started on Norco and steroid  She states that yesterday felt better that today was significantly worse  No fever, no chills  She does note that she had the COVID vaccine in this arm but that was 4 months ago she has not had any complications since then  Prior to Admission Medications   Prescriptions Last Dose Informant Patient Reported? Taking?    Black Cohosh 40 MG CAPS   Yes Yes   Sig: Take by mouth   EVENING PRIMROSE OIL PO   Yes Yes   Sig: Take by mouth   HYDROcodone-acetaminophen (NORCO) 5-325 mg per tablet 2021 at 1600  No Yes   Sig: Take 1 tablet by mouth every 6 (six) hours as needed for pain for up to 12 dosesMax Daily Amount: 4 tablets   b complex vitamins capsule   Yes Yes   Sig: Take 1 capsule by mouth daily   cholecalciferol (VITAMIN D3) 1,000 units tablet  Self Yes Yes   Sig: Take 2,000 Units by mouth daily    fluticasone (FLONASE) 50 mcg/act nasal spray   No Yes   Si spray into each nostril daily   Patient taking differently: 1 spray into each nostril as needed    senna-docusate sodium (SENOKOT S) 8 6-50 mg per tablet   No Yes   Sig: Take 1 tablet by mouth daily      Facility-Administered Medications: None       Past Medical History:   Diagnosis Date    Allergic        Past Surgical History:   Procedure Laterality Date    CARDIAC ELECTROPHYSIOLOGY STUDY AND ABLATION       SECTION      TUBAL LIGATION         Family History   Problem Relation Age of Onset    No Known Problems Mother     No Known Problems Father     No Known Problems Sister    Jen Justin No Known Problems Daughter     No Known Problems Maternal Grandmother     No Known Problems Paternal Grandmother     No Known Problems Sister     No Known Problems Sister     No Known Problems Sister     No Known Problems Maternal Aunt     No Known Problems Paternal Aunt      I have reviewed and agree with the history as documented  E-Cigarette/Vaping    E-Cigarette Use Never User      E-Cigarette/Vaping Substances     Social History     Tobacco Use    Smoking status: Never Smoker    Smokeless tobacco: Never Used   Substance Use Topics    Alcohol use: Yes     Alcohol/week: 1 0 standard drinks     Types: 1 Glasses of wine per week     Comment: occasional    Drug use: No       Review of Systems   Constitutional: Negative for activity change, chills, fatigue and fever  HENT: Negative for congestion  Eyes: Negative for visual disturbance  Respiratory: Negative for cough, chest tightness and shortness of breath  Cardiovascular: Negative for chest pain  Gastrointestinal: Negative for abdominal pain, diarrhea and vomiting  Genitourinary: Negative for dysuria  Skin: Negative for rash  Neurological: Negative for dizziness, weakness and numbness  Physical Exam  Physical Exam  Constitutional:       General: She is in acute distress  Appearance: She is well-developed  She is not ill-appearing, toxic-appearing or diaphoretic  HENT:      Head: Normocephalic and atraumatic  Eyes:      Conjunctiva/sclera: Conjunctivae normal       Pupils: Pupils are equal, round, and reactive to light  Neck:      Musculoskeletal: Normal range of motion and neck supple  Cardiovascular:      Rate and Rhythm: Normal rate and regular rhythm  Heart sounds: Normal heart sounds  Pulmonary:      Effort: Pulmonary effort is normal  No respiratory distress  Breath sounds: Normal breath sounds  Abdominal:      General: Bowel sounds are normal       Palpations: Abdomen is soft  Musculoskeletal:         General: Swelling and tenderness present  Comments: Fullness in the triceps when palpated  Some swelling in the left hand  Left tricep feels warmer than the right side  Skin:     General: Skin is warm and dry  Capillary Refill: Capillary refill takes less than 2 seconds  Coloration: Jaundice: Due to discomfort  Neurological:      General: No focal deficit present  Mental Status: She is alert and oriented to person, place, and time  Sensory: No sensory deficit     Psychiatric:         Behavior: Behavior normal          Vital Signs  ED Triage Vitals   Temperature Pulse Respirations Blood Pressure SpO2   04/22/21 2205 04/22/21 1840 04/22/21 1840 04/22/21 1840 04/22/21 1840   98 1 °F (36 7 °C) 86 20 124/73 100 %      Temp src Heart Rate Source Patient Position - Orthostatic VS BP Location FiO2 (%)   -- 04/22/21 1840 -- -- --    Monitor         Pain Score       04/22/21 1840       Worst Possible Pain           Vitals:    04/22/21 1840 04/22/21 2205   BP: 124/73 131/71   Pulse: 86 79         Visual Acuity      ED Medications  Medications   ketorolac (TORADOL) injection 15 mg (15 mg Intravenous Given 4/22/21 2031)   methylPREDNISolone sodium succinate (Solu-MEDROL) injection 125 mg (125 mg Intravenous Given 4/22/21 2031)   iohexol (OMNIPAQUE) 350 MG/ML injection (SINGLE-DOSE) 100 mL (100 mL Intravenous Given 4/22/21 2114)       Diagnostic Studies  Results Reviewed     Procedure Component Value Units Date/Time    Basic metabolic panel [[id number]]  (Abnormal) Collected: 04/22/21 2001    Lab Status: Final result Specimen: Blood Updated: 04/22/21 2015     Sodium 141 mmol/L      Potassium 3 7 mmol/L      Chloride 105 mmol/L      CO2 29 mmol/L      ANION GAP 7 mmol/L      BUN 21 mg/dL      Creatinine 0 79 mg/dL      Glucose 101 mg/dL      Calcium 8 4 mg/dL      eGFR 84 ml/min/1 73sq m     Narrative:      [place] guidelines for Chronic Kidney Disease (CKD):     Stage 1 with normal or high GFR (GFR > 90 mL/min/1 73 square meters)    Stage 2 Mild CKD (GFR = 60-89 mL/min/1 73 square meters)    Stage 3A Moderate CKD (GFR = 45-59 mL/min/1 73 square meters)    Stage 3B Moderate CKD (GFR = 30-44 mL/min/1 73 square meters)    Stage 4 Severe CKD (GFR = 15-29 mL/min/1 73 square meters)    Stage 5 End Stage CKD (GFR <15 mL/min/1 73 square meters)  Note: GFR calculation is accurate only with a steady state creatinine    C-reactive protein [[id number]]  (Normal) Collected: 04/22/21 2001    Lab Status: Final result Specimen: Blood Updated: 04/22/21 2014     CRP <5 0 mg/L     Sedimentation rate, automated [[id number]]  (Normal) Collected: 04/22/21 2000    Lab Status: Final result Specimen: Blood Updated: 04/22/21 2008     Sed Rate 4 mm/hour     CBC and differential [[id number]]  (Abnormal) Collected: 04/22/21 2000    Lab Status: Final result Specimen: Blood Updated: 04/22/21 2008     WBC 10 80 Thousand/uL      RBC 4 18 Million/uL      Hemoglobin 12 9 g/dL      Hematocrit 38 6 %      MCV 92 fL      MCH 30 9 pg      MCHC 33 5 g/dL      RDW 13 3 %      MPV 7 7 fL      Platelets 841 Thousands/uL      Neutrophils Relative 67 %      Lymphocytes Relative 25 %      Monocytes Relative 7 %      Eosinophils Relative 0 %      Basophils Relative 1 %      Neutrophils Absolute 7 20 Thousands/µL      Lymphocytes Absolute 2 60 Thousands/µL      Monocytes Absolute 0 80 Thousand/µL      Eosinophils Absolute 0 00 Thousand/µL      Basophils Absolute 0 10 Thousands/µL     D-dimer, quantitative [[id number]]  (Normal) Collected: 04/22/21 1915    Lab Status: Final result Specimen: Blood from Arm, Right Updated: 04/22/21 1931     D-Dimer, Quant 0 34 ug/ml FEU                  CT upper extremity w contrast left   Final Result by Allan Novoa MD (04/22 2136)      1  No acute osseous abnormality  No evidence of vascular malformation   2    Calcific tendinopathy of the infraspinatus Workstation performed: [id number]                    Procedures  Procedures         ED Course                                           MDM  Number of Diagnoses or Management Options  Acute pain of left shoulder: new and requires workup  Diagnosis management comments: This is a 51-year-old female with severe pain in the left shoulder  Patient has a very large calcified area which likely the cause of her pain  Patient responded extremely favorably to NSAIDs  Patient has an appointment with Dr Christy Cota of Orthopedics on Tuesday  Due to feeling of fullness and swelling as well as unable warmth extended workup completed  Discussed warning signs and symptoms with the patient as well as when to return to the emergency department versus follow up with [facility]  Patient states understanding and agreement with the plan  This note was completed using dictation software  Amount and/or Complexity of Data Reviewed  Clinical lab tests: ordered and reviewed  Tests in the radiology section of CPT®: ordered and reviewed  Independent visualization of images, tracings, or specimens: yes        Disposition  Final diagnoses:   Acute pain of left shoulder     Time reflects when diagnosis was documented in both MDM as applicable and the Disposition within this note     Time User Action Codes Description Comment    4/22/2021  9:48 PM Francine Parham Add [B56 [id number]] Acute pain of left shoulder       ED Disposition     ED Disposition Condition Date/Time Comment    Discharge Stable u Apr 22, 2021  9:48 PM Mary Beth Payment discharge to home/self care              Follow-up Information     Follow up With Specialties Details Why Contact Info    Rey Antonio DO Orthopedic Surgery   [address]  [address] [facility]  [phone]            Discharge Medication List as of 4/22/2021  9:50 PM      START taking these medications    Details   naproxen (NAPROSYN) 375 mg tablet Take 1 tablet (375 mg total) by mouth 2 (two) times a day with meals, Starting Thu 4/22/2021, Normal         CONTINUE these medications which have NOT CHANGED    Details   b complex vitamins capsule Take 1 capsule by mouth daily, Historical Med      Black Cohosh 40 MG CAPS Take by mouth, Historical Med      cholecalciferol (VITAMIN D3) 1,000 units tablet Take 2,000 Units by mouth daily , Historical Med      EVENING PRIMROSE OIL PO Take by mouth, Historical Med      fluticasone (FLONASE) 50 mcg/act nasal spray 1 spray into each nostril daily, Starting Tue 3/24/2020, Normal      HYDROcodone-acetaminophen (NORCO) 5-325 mg per tablet Take 1 tablet by mouth every 6 (six) hours as needed for pain for up to 12 dosesMax Daily Amount: 4 tablets, Starting Wed 4/21/2021, Normal      senna-docusate sodium (SENOKOT S) 8 6-50 mg per tablet Take 1 tablet by mouth daily, Starting Wed 4/21/2021, Normal           No discharge procedures on file      PDMP Review     None          ED Provider  Electronically Signed by           Britni Mejia MD  04/22/21 9120

## 2021-04-29 ENCOUNTER — OFFICE VISIT (OUTPATIENT)
Dept: OBGYN CLINIC | Facility: CLINIC | Age: 56
End: 2021-04-29
Payer: COMMERCIAL

## 2021-04-29 VITALS
HEART RATE: 90 BPM | DIASTOLIC BLOOD PRESSURE: 84 MMHG | BODY MASS INDEX: 24.17 KG/M2 | SYSTOLIC BLOOD PRESSURE: 125 MMHG | WEIGHT: 128 LBS | HEIGHT: 61 IN

## 2021-04-29 DIAGNOSIS — M75.32 CALCIFIC TENDONITIS OF LEFT SHOULDER: Primary | ICD-10-CM

## 2021-04-29 PROCEDURE — 1036F TOBACCO NON-USER: CPT | Performed by: ORTHOPAEDIC SURGERY

## 2021-04-29 PROCEDURE — 99203 OFFICE O/P NEW LOW 30 MIN: CPT | Performed by: ORTHOPAEDIC SURGERY

## 2021-04-29 RX ORDER — NAPROXEN SODIUM 550 MG/1
550 TABLET ORAL 2 TIMES DAILY WITH MEALS
Qty: 60 TABLET | Refills: 1 | Status: SHIPPED | OUTPATIENT
Start: 2021-04-29 | End: 2021-06-15

## 2021-04-29 NOTE — LETTER
April 29, 2021     Patient: Mathew Drake   YOB: 1965   Date of Visit: 4/29/2021       To Whom it May Concern:    Mathew Drake is under my professional care  She was seen in my office on 4/29/2021  She may return to work on 4/30/21 with no restrictions  If you have any questions or concerns, please don't hesitate to call           Sincerely,          Richard Juarez MD        CC: No Recipients

## 2021-04-29 NOTE — PROGRESS NOTES
Assessment:     1  Calcific tendonitis of left shoulder          Plan:     Problem List Items Addressed This Visit        Musculoskeletal and Integument    Calcific tendonitis of left shoulder - Primary    Relevant Medications    naproxen sodium (ANAPROX) 550 mg tablet    Other Relevant Orders    Ambulatory referral to Physical Therapy             70-year-old female with exacerbation of her left shoulder pain  X-rays and CT scan show underlying calcific tendinitis  Think there was something that seem to aggravate this and flare it up  She seems to be doing much better since the intermuscular cortisone injection she was given in the ER  I discussed the radiographs and diagnosis with her  I recommended physical therapy for her she continues to have some slight impingement and tightness noted throughout the shoulder region  She has no restrictions  I think continuing the naproxen at this point would be good for her  I will see her back in the future if she is failing to have significant improvement with consideration of subacromial injection at that time  We discussed the expected disease progression  Patient ID: Bob Aragon is a 64 y o  female  Chief Complaint:  Left shoulder pain    HPI:  70-year-old female here today for left shoulder pain  She had her COVID vaccine approximately a month ago and states that ever since that time the arm has felt a little bit strange  On April 20th she began developing pain in the left shoulder region  She had work the day previously but was off that day  By evening and nighttime she had severe pain which she ended up going to the emergency room  She was given an intramuscular cortisone injection and some pain medication  She states that that helped a little bit but by the following afternoon it was starting to get worse again she went back to the emergency room  At that point she noted swelling in the shoulder and arm as well as warmth    DVT was ruled out and a CT scan was obtained  She initially was taking naproxen every 5 hours along with Tylenol, now the pain is improved and she is only needing to take the naproxen twice daily  She states overall the pain is much better, but she complains of some tightness and soreness in the arm region  She is right-hand dominant  She works in housekeeping at [employer]  Allergy:  Allergies   Allergen Reactions    Penicillins GI Intolerance       Medications:  all current active meds have been reviewed    Past Medical History:  Past Medical History:   Diagnosis Date    Allergic        Past Surgical History:  Past Surgical History:   Procedure Laterality Date    CARDIAC ELECTROPHYSIOLOGY STUDY AND ABLATION       SECTION      TUBAL LIGATION         Family History:  Family History   Problem Relation Age of Onset    No Known Problems Mother     No Known Problems Father     No Known Problems Sister     No Known Problems Daughter     No Known Problems Maternal Grandmother     No Known Problems Paternal Grandmother     No Known Problems Sister     No Known Problems Sister     No Known Problems Sister     No Known Problems Maternal Aunt     No Known Problems Paternal Aunt        Social History:  Social History     Substance and Sexual Activity   Alcohol Use Yes    Alcohol/week: 1 0 standard drinks    Types: 1 Glasses of wine per week    Comment: occasional     Social History     Substance and Sexual Activity   Drug Use No     Social History     Tobacco Use   Smoking Status Never Smoker   Smokeless Tobacco Never Used           ROS:  Review of Systems   Constitutional: Negative  Negative for chills and fever  HENT: Negative  Negative for ear pain and sore throat  Eyes: Negative  Negative for pain and visual disturbance  Respiratory: Negative  Negative for cough and shortness of breath  Cardiovascular: Negative  Negative for chest pain and palpitations  Gastrointestinal: Negative    Negative for abdominal pain and vomiting  Endocrine: Negative  Genitourinary: Negative  Negative for dysuria and hematuria  Musculoskeletal: Positive for arthralgias  Negative for back pain  Skin: Negative  Negative for color change and rash  Allergic/Immunologic: Negative  Neurological: Negative  Negative for seizures and syncope  Hematological: Negative  Psychiatric/Behavioral: Negative  All other systems reviewed and are negative  Objective:  BP Readings from Last 1 Encounters:   04/29/21 125/84      Wt Readings from Last 1 Encounters:   04/29/21 58 1 kg (128 lb)        BMI:   Estimated body mass index is 24 19 kg/m² as calculated from the following:    Height as of this encounter: 5' 1" (1 549 m)  Weight as of this encounter: 58 1 kg (128 lb)  EXAM:   Physical Exam  Vitals signs reviewed  Constitutional:       General: She is not in acute distress  Appearance: She is well-developed  She is not diaphoretic  HENT:      Head: Normocephalic and atraumatic  Eyes:      General:         Right eye: No discharge  Left eye: No discharge  Neck:      Musculoskeletal: Normal range of motion and neck supple  Trachea: No tracheal deviation  Cardiovascular:      Rate and Rhythm: Normal rate and regular rhythm  Pulmonary:      Effort: Pulmonary effort is normal  No respiratory distress  Breath sounds: Normal breath sounds  Chest:      Chest wall: No tenderness  Abdominal:      General: There is no distension  Palpations: Abdomen is soft  Tenderness: There is no abdominal tenderness  Skin:     General: Skin is warm and dry  Findings: No erythema  Neurological:      Mental Status: She is alert  Psychiatric:         Behavior: Behavior normal        Right Shoulder Exam   Right shoulder exam is normal     Tenderness   The patient is experiencing no tenderness  Range of Motion   The patient has normal right shoulder ROM      Muscle Strength   The patient has normal right shoulder strength  Tests   Apprehension: negative  Villegas test: negative  Cross arm: negative  Impingement: negative    Other   Erythema: absent  Sensation: normal  Pulse: present      Left Shoulder Exam     Tenderness   Left shoulder tenderness location: Mild tenderness in trapezial muscle  Range of Motion   Active abduction: normal   Extension: normal   External rotation: normal   Forward flexion: normal   Internal rotation 0 degrees: Lumbar     Muscle Strength   The patient has normal left shoulder strength  Tests   Apprehension: negative  Villegas test: negative  Cross arm: negative  Impingement: positive  Drop arm: negative    Other   Erythema: absent  Sensation: normal  Pulse: present     Comments:  No swelling    Very minimal crepitus with passive range of motion              Radiographs:  I have personally reviewed pertinent films in PACS and my interpretation is calcific tendonitis noted of the infraspinatus tendon  Well maintained joint spaces, no fractures or dislocations

## 2021-05-03 ENCOUNTER — OFFICE VISIT (OUTPATIENT)
Dept: FAMILY MEDICINE CLINIC | Facility: CLINIC | Age: 56
End: 2021-05-03
Payer: COMMERCIAL

## 2021-05-03 VITALS
HEART RATE: 93 BPM | WEIGHT: 127 LBS | HEIGHT: 61 IN | SYSTOLIC BLOOD PRESSURE: 108 MMHG | RESPIRATION RATE: 18 BRPM | TEMPERATURE: 98.5 F | OXYGEN SATURATION: 98 % | DIASTOLIC BLOOD PRESSURE: 70 MMHG | BODY MASS INDEX: 23.98 KG/M2

## 2021-05-03 DIAGNOSIS — M25.512 ACUTE PAIN OF LEFT SHOULDER: ICD-10-CM

## 2021-05-03 DIAGNOSIS — R23.2 HOT FLASHES: Primary | ICD-10-CM

## 2021-05-03 DIAGNOSIS — Z13.220 SCREENING CHOLESTEROL LEVEL: ICD-10-CM

## 2021-05-03 DIAGNOSIS — K21.00 GASTROESOPHAGEAL REFLUX DISEASE WITH ESOPHAGITIS WITHOUT HEMORRHAGE: ICD-10-CM

## 2021-05-03 PROCEDURE — 3008F BODY MASS INDEX DOCD: CPT | Performed by: ORTHOPAEDIC SURGERY

## 2021-05-03 PROCEDURE — 1036F TOBACCO NON-USER: CPT | Performed by: NURSE PRACTITIONER

## 2021-05-03 PROCEDURE — 99214 OFFICE O/P EST MOD 30 MIN: CPT | Performed by: NURSE PRACTITIONER

## 2021-05-03 RX ORDER — OMEPRAZOLE 20 MG/1
20 CAPSULE, DELAYED RELEASE ORAL
Qty: 90 CAPSULE | Refills: 3 | Status: SHIPPED | OUTPATIENT
Start: 2021-05-03

## 2021-05-03 NOTE — PATIENT INSTRUCTIONS
Ok to take Naproxen only when needed for pain, add Omeprazole for less acid reflux  Get labs as ordered  Physical in August or call sooner for problems/concerns

## 2021-05-03 NOTE — PROGRESS NOTES
[facility]        NAME: Scott Metcalf is a 64 y o  female  : 1965    MRN: [id number]  DATE: May 3, 2021  TIME: 4:11 PM    Assessment and Plan   Hot flashes [R23 2]  1  Hot flashes  TSH, 3rd generation with Free T4 reflex    Vitamin B12    Lyme Antibody Profile with reflex to WB   2  Gastroesophageal reflux disease with esophagitis without hemorrhage  omeprazole (PriLOSEC) 20 mg delayed release capsule    Comprehensive metabolic panel   3  Screening cholesterol level  Lipid panel   4  Acute pain of left shoulder  Lyme Antibody Profile with reflex to WB         Patient Instructions     Patient Instructions   Ok to take Naproxen only when needed for pain, add Omeprazole for less acid reflux  Get labs as ordered  Physical in August or call sooner for problems/concerns          Chief Complaint     Chief Complaint   Patient presents with   [address]     Previous Dr Pam Veloz and Anabelle Garcia patient    Menopause     Increase in hot flashes  Would like thyroid to be checked         History of Present Illness       Here today to establish care- recently seen in the ER x 2 for left shoulder pain- diagnosed with calcium deposit on shoulder- seen by Dr Kendra Valenzuela with Ortho- ordered 8 weeks of physical therapy  Starts PT on Wednesday  Pain has improved since ER visit  Would like to get her Thyroid checked  Recently had pap smear done with Dr Charmayne New- asked him if she could take something for hot flashes  Has tried [employer] in the past  Reports getting burning in her epigastric area with warmth and weakness all over- last a few seconds  Does not get sweaty  After the warmth is finished she continues to feel drained  2 weeks ago it occurred 20x/day  This week "it's been pretty good"       Review of Systems   Review of Systems   Constitutional: Negative for activity change, diaphoresis, fatigue and fever     HENT: Negative for congestion, facial swelling, hearing loss, rhinorrhea, sinus pressure, sinus pain, sneezing, sore throat and voice change  Eyes: Negative for discharge and visual disturbance  Respiratory: Negative for cough, choking, chest tightness, shortness of breath, wheezing and stridor  Cardiovascular: Negative for chest pain, palpitations and leg swelling  Gastrointestinal: Positive for abdominal pain (burning, epigastric)  Negative for abdominal distention, constipation, diarrhea, nausea and vomiting  Endocrine: Negative for polydipsia, polyphagia and polyuria  Genitourinary: Negative for difficulty urinating, dysuria, frequency and urgency  Musculoskeletal: Positive for arthralgias (left shoulder)  Negative for back pain, gait problem, joint swelling, myalgias, neck pain and neck stiffness  Skin: Negative for color change, rash and wound  Neurological: Negative for dizziness, syncope, speech difficulty, weakness, light-headedness and headaches  Hematological: Negative for adenopathy  Does not bruise/bleed easily  Psychiatric/Behavioral: Negative for agitation, behavioral problems, confusion, hallucinations, sleep disturbance and suicidal ideas  The patient is not nervous/anxious            Current Medications       Current Outpatient Medications:     cholecalciferol (VITAMIN D3) 1,000 units tablet, Take 2,000 Units by mouth daily , Disp: , Rfl:     EVENING PRIMROSE OIL PO, Take by mouth, Disp: , Rfl:     naproxen sodium (ANAPROX) 550 mg tablet, Take 1 tablet (550 mg total) by mouth 2 (two) times a day with meals, Disp: 60 tablet, Rfl: 1    b complex vitamins capsule, Take 1 capsule by mouth daily, Disp: , Rfl:     Black Cohosh 40 MG CAPS, Take by mouth, Disp: , Rfl:     omeprazole (PriLOSEC) 20 mg delayed release capsule, Take 1 capsule (20 mg total) by mouth daily before breakfast, Disp: 90 capsule, Rfl: 3    Current Allergies     Allergies as of 05/03/2021 - Reviewed 05/03/2021   Allergen Reaction Noted    Penicillins GI Intolerance 04/22/2020 The following portions of the patient's history were reviewed and updated as appropriate: allergies, current medications, past family history, past medical history, past social history, past surgical history and problem list      Past Medical History:   Diagnosis Date    Allergic        Past Surgical History:   Procedure Laterality Date    CARDIAC ELECTROPHYSIOLOGY STUDY AND ABLATION       SECTION      TUBAL LIGATION         Family History   Problem Relation Age of Onset    No Known Problems Mother     No Known Problems Father     No Known Problems Sister     No Known Problems Daughter     No Known Problems Maternal Grandmother     No Known Problems Paternal Grandmother     No Known Problems Sister     No Known Problems Sister     No Known Problems Sister     No Known Problems Maternal Aunt     No Known Problems Paternal Aunt          Medications have been verified  Objective   /70   Pulse 93   Temp 98 5 °F (36 9 °C) (Tympanic)   Resp 18   Ht 5' 1" (1 549 m)   Wt 57 6 kg (127 lb)   LMP 2021 (Approximate)   SpO2 98%   BMI 24 00 kg/m²        Physical Exam     Physical Exam  Vitals signs and nursing note reviewed  Constitutional:       General: She is not in acute distress  Appearance: Normal appearance  She is well-developed  She is not diaphoretic  Neck:      Musculoskeletal: Normal range of motion and neck supple  Thyroid: No thyromegaly  Trachea: No tracheal deviation  Cardiovascular:      Rate and Rhythm: Normal rate and regular rhythm  Heart sounds: Normal heart sounds  No murmur  Pulmonary:      Effort: Pulmonary effort is normal  No respiratory distress  Breath sounds: Normal breath sounds  No wheezing  Abdominal:      General: Abdomen is flat  Bowel sounds are normal  There is no distension  Palpations: Abdomen is soft  Tenderness: There is no abdominal tenderness  Musculoskeletal: Normal range of motion  General: No tenderness or deformity  Right lower leg: No edema  Left lower leg: No edema  Skin:     General: Skin is warm and dry  Findings: No erythema or rash  Neurological:      General: No focal deficit present  Mental Status: She is alert and oriented to person, place, and time  Psychiatric:         Mood and Affect: Mood normal          Behavior: Behavior normal  Behavior is cooperative  Thought Content:  Thought content normal          Judgment: Judgment normal

## 2021-05-05 ENCOUNTER — EVALUATION (OUTPATIENT)
Dept: PHYSICAL THERAPY | Facility: CLINIC | Age: 56
End: 2021-05-05
Payer: COMMERCIAL

## 2021-05-05 ENCOUNTER — APPOINTMENT (OUTPATIENT)
Dept: LAB | Facility: HOSPITAL | Age: 56
End: 2021-05-05
Payer: COMMERCIAL

## 2021-05-05 DIAGNOSIS — Z13.220 SCREENING CHOLESTEROL LEVEL: ICD-10-CM

## 2021-05-05 DIAGNOSIS — M75.32 CALCIFIC TENDONITIS OF LEFT SHOULDER: ICD-10-CM

## 2021-05-05 DIAGNOSIS — K21.00 GASTROESOPHAGEAL REFLUX DISEASE WITH ESOPHAGITIS WITHOUT HEMORRHAGE: ICD-10-CM

## 2021-05-05 DIAGNOSIS — M25.512 ACUTE PAIN OF LEFT SHOULDER: ICD-10-CM

## 2021-05-05 DIAGNOSIS — R23.2 HOT FLASHES: ICD-10-CM

## 2021-05-05 LAB
ALBUMIN SERPL BCP-MCNC: 4.4 G/DL (ref 3.5–5.7)
ALP SERPL-CCNC: 47 U/L (ref 40–150)
ALT SERPL W P-5'-P-CCNC: 10 U/L (ref 7–52)
ANION GAP SERPL CALCULATED.3IONS-SCNC: 8 MMOL/L (ref 4–13)
AST SERPL W P-5'-P-CCNC: 13 U/L (ref 13–39)
BILIRUB SERPL-MCNC: 0.5 MG/DL (ref 0.2–1)
BUN SERPL-MCNC: 16 MG/DL (ref 7–25)
CALCIUM SERPL-MCNC: 9.7 MG/DL (ref 8.6–10.5)
CHLORIDE SERPL-SCNC: 104 MMOL/L (ref 98–107)
CHOLEST SERPL-MCNC: 173 MG/DL (ref 0–200)
CO2 SERPL-SCNC: 29 MMOL/L (ref 21–31)
CREAT SERPL-MCNC: 0.83 MG/DL (ref 0.6–1.2)
GFR SERPL CREATININE-BSD FRML MDRD: 79 ML/MIN/1.73SQ M
GLUCOSE P FAST SERPL-MCNC: 91 MG/DL (ref 65–99)
HDLC SERPL-MCNC: 66 MG/DL
LDLC SERPL CALC-MCNC: 98 MG/DL (ref 0–100)
NONHDLC SERPL-MCNC: 107 MG/DL
POTASSIUM SERPL-SCNC: 3.9 MMOL/L (ref 3.5–5.5)
PROT SERPL-MCNC: 6.9 G/DL (ref 6.4–8.9)
SODIUM SERPL-SCNC: 141 MMOL/L (ref 134–143)
TRIGL SERPL-MCNC: 45 MG/DL (ref 44–166)
TSH SERPL DL<=0.05 MIU/L-ACNC: 2.26 UIU/ML (ref 0.45–5.33)
VIT B12 SERPL-MCNC: 913 PG/ML (ref 100–900)

## 2021-05-05 PROCEDURE — 82607 VITAMIN B-12: CPT

## 2021-05-05 PROCEDURE — 36415 COLL VENOUS BLD VENIPUNCTURE: CPT

## 2021-05-05 PROCEDURE — 80061 LIPID PANEL: CPT

## 2021-05-05 PROCEDURE — 86618 LYME DISEASE ANTIBODY: CPT

## 2021-05-05 PROCEDURE — 97161 PT EVAL LOW COMPLEX 20 MIN: CPT | Performed by: PHYSICAL MEDICINE & REHABILITATION

## 2021-05-05 PROCEDURE — 97110 THERAPEUTIC EXERCISES: CPT | Performed by: PHYSICAL MEDICINE & REHABILITATION

## 2021-05-05 PROCEDURE — 84443 ASSAY THYROID STIM HORMONE: CPT

## 2021-05-05 PROCEDURE — 80053 COMPREHEN METABOLIC PANEL: CPT

## 2021-05-05 NOTE — PROGRESS NOTES
PT Evaluation     Today's date: 2021  Patient name: Iliana Beard  : 1965  MRN: [id number]  Referring provider: Siri Everett MD  Dx:   Encounter Diagnosis     ICD-10-CM    1  Calcific tendonitis of left shoulder  M75 [id number] Ambulatory referral to Physical Therapy                  Assessment  Assessment details: Iliana Beard is a 64 y o  female presenting to [address] [place] [address] Physical Therapy for acute L shoulder pain  Current impairments at initial evaluation include decreased and painful L shoulder ROM, decreased soft tissue mobility of cervical and parascapular muscles, decreased UE strength, pain with function, decreased postural awareness, and overall activity intolerance  This has created functional deficits including increased reliance on co-workers to perform overhead activities at work, increased reliance on medication and modalities for pain control, and difficulty sleeping throughout the night due to pain  Objective findings show palpable tenderness and muscular tightness at left upper trapezius as well as surrounding L parascapular musculature  PROM of L shoulder limited and painful at end range with empty end feels with flexion, abduction and ER  L shoulder resisted testing slightly limited and painful with resisted shoulder flexion, abduction and ER  Postural observation revealing head forward/rounded shoulders in sitting and standing  Special testing such as Lisa Maximo, empty can/full can, and Yergasons test  Patient will require skilled PT 2x a week for 4 weeks to address the listed impairments and maximize functional mobility to return to her PLOF  Impairments: abnormal or restricted ROM, abnormal movement, activity intolerance, impaired physical strength, lacks appropriate home exercise program, pain with function, poor posture  and poor body mechanics    Symptom irritability: lowUnderstanding of Dx/Px/POC: good   Prognosis: good    Goals  STG - to be achieved in 3 weeks:  1  Patient will increase L shoulder flexion PROM by at least 10 degrees in order to 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  2  Patient will increase L shoulder flexion and ER muscle strengths by at least one muscle grade to be able to lift objects of at least 10lbs at work  3  Patient will decrease max pain score by at least 1 grade in order to sleep 5 hours throughout the night before awaking due to pain  LTG- to be achieved in 6 weeks:   1  Patient will increase L shoulder PROM to be [facility] to increase her overall functional mobility to be independent in activities at work  2  Patient will increase L shoulder strength to be WFL to be able to lift objects at work with no restrictions  3  Patient will decrease max pain score to be 1/10 in order to report overall quality of life to be excellent  Plan  Patient would benefit from: skilled physical therapy  Referral necessary: No  Planned modality interventions: cryotherapy, thermotherapy: hydrocollator packs and ultrasound  Planned therapy interventions: activity modification, body mechanics training, flexibility, functional ROM exercises, graded activity, graded exercise, home exercise program, manual therapy, massage, joint mobilization, neuromuscular re-education, patient education, postural training, strengthening, stretching, therapeutic activities and therapeutic exercise  Frequency: 2x week  Duration in visits: 8  Duration in weeks: 4  Plan of Care beginning date: 5/5/2021  Plan of Care expiration date: 6/4/2021  Treatment plan discussed with: patient        Subjective Evaluation    History of Present Illness  Mechanism of injury: Here for calcific tendonitis of L shoulder at infraspinatus tendon  Went to ER on 4/21/21 due to experiencing intense L shoulder pain and hand swelling that started progressively 3 days earlier to visit to ER  (States she has not had any other incidence of hand swelling in the past)   Does not remember a specific KEON  At ER they took  X-rays and CT scan which showed underlying calcific tendinitis of the infraspinatus tendon  Think there was something at work that seemed to aggravate this and flare it up; pt states she does a lot of overhead work  She seems to be doing much better since the intermuscular cortisone injection she was given in the ER  Saw MD (Dr Juan Daniel Wood) a couple of days after trip to ER in which she recommended trial of PT  Since start of taking Naproxen she reports she has a lot less pain and other difficulties  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 [employer] and has been okay with this  Relying on assistance from other co-workers for overhead activities  At first she was icing which she found relief    Feels a lot of  pulling in the UT region on the L side   Quality of life: good    Pain  Current pain ratin  At best pain ratin  At worst pain rating: 3  Location: superior posterior aspect of L shoulder, when flared up will be in lateral brachial aspect   Quality: sharp  Relieving factors: medications, ice and rest  Aggravating factors: overhead activity and lifting  Progression: improved    Social Support  Steps to enter house: yes  Stairs in house: no   Lives in: multiple-level home  Lives with: spouse    Employment status: working (housekeeping at hospital )  Hand dominance: right      Diagnostic Tests  X-ray: abnormal  CT scan: abnormal    FCE comments: Calcific tendonitis of L infraspinatus tendon Treatments  Previous treatment: medication  Current treatment: medication and physical therapy  Patient Goals  Patient goals for therapy: decreased pain, increased motion, increased strength and independence with ADLs/IADLs  Patient's goals regarding treatment: independence at work to not rely on other coworkers         Objective     Postural Observations  Seated posture: fair  Standing posture: fair        Palpation   Left   No palpable tenderness to the biceps and supraspinatus  Muscle spasm in the upper trapezius  Tenderness of the infraspinatus  Trigger point to levator scapulae, rhomboids and upper trapezius  Additional Palpation Details  L UT very tight with muscle guarding   Will continue to monitor     Cervical/Thoracic Screen   Cervical range of motion within normal limits with the following exceptions: Cervical flexion 35 degrees slight pain at medial border of scap   Cervical extension 55 degrees   Cervical SB to L 45 degrees   Cervical SB to R 30 degrees (stiff and painful)   Cervial rotation 55 degrees to L   Cervical rotation 35 degrees to R (Stiff and painful in L UT)     Active Range of Motion   Left Shoulder   Flexion: Left shoulder active forward flexion: painful at endrange, heavily compenstates with UT  WFL  Abduction: Left shoulder active abduction: painful at endrange, heavily compenstates with UT  [facility]    Additional Active Range of Motion Details  Heavily compensates with UTs bilaterally   Will continue to monitor     Passive Range of Motion   Left Shoulder   Flexion: 123 degrees with pain  Abduction: 120 degrees with pain    Additional Passive Range of Motion Details  Empty endfeel with both flexion and abduction due to muscle guarding   Will continue to monitor     Strength/Myotome Testing     Left Shoulder     Planes of Motion   Flexion: 4 (painful with resisted motion )   Abduction: 4 (painful with resisted motion )   External rotation at 0°: 4 (painful with resisted motion )     Tests     Left Shoulder   Positive empty can, full can, Hawkin's and Yergason's  Negative horn blower, lift-off, Neer's and Speed's                Precautions: Hx of supraventricular tachycardia       Re-eval Date: 6/4    Date 5/5       Visit Count 1       FOTO 5/5       Pain In See IE       Pain Out See IE            Manuals 5/5        PROM to L shoulder in all directions to tolerance         STM to L UT and L parascapular muscles for trigger point release Neuro Re-Ed                                                                        Ther Ex         UBE      UT stretch     Levator scap stretch     Wall slides     Table slides     Iso ER/IR    Serratus punches     SL ER    scap retractions     Cervical retractions    MTP Pulls     LTP pulls     Prone I'/T'/Y's     Prone row      X30"              X10"    3x5"                                                                                           Ther Activity                           Gait Training                           Modalities                               Patient was treated by Student Physical Therapist Clarise Show under the direct supervision of Gina McArdle PT , DPT     5/5 - HEP was issued and reviewed this date for above noted exercises  Pt demonstrated understanding without incident and without questions/concerns  Will continue to update upcoming

## 2021-05-06 LAB — B BURGDOR IGG+IGM SER-ACNC: -18

## 2021-05-10 ENCOUNTER — OFFICE VISIT (OUTPATIENT)
Dept: PHYSICAL THERAPY | Facility: CLINIC | Age: 56
End: 2021-05-10
Payer: COMMERCIAL

## 2021-05-10 DIAGNOSIS — M75.32 CALCIFIC TENDONITIS OF LEFT SHOULDER: Primary | ICD-10-CM

## 2021-05-10 PROCEDURE — 97140 MANUAL THERAPY 1/> REGIONS: CPT | Performed by: PHYSICAL MEDICINE & REHABILITATION

## 2021-05-10 PROCEDURE — 97110 THERAPEUTIC EXERCISES: CPT | Performed by: PHYSICAL MEDICINE & REHABILITATION

## 2021-05-10 NOTE — PROGRESS NOTES
Daily Note     Today's date: 5/10/2021  Patient name: Benoit Etienne  : 1965  MRN: [id number]  Referring provider: Karla Major MD  Dx:   Encounter Diagnosis     ICD-10-CM    1  Calcific tendonitis of left shoulder  M75 32                   Subjective: Pt notes no new sx/complaints  Was working all day "without an issue"  Does note her L shoulder/arm and posterior L shoulder/blade "feel different" than the R side, however no sx/complaints presently  Cont'd chronic pain L thumb with work activity; was present all day and all weekend  No neck pain/stiffness etc  NO radicular pain or n/t per pt  Objective: See treatment diary below      Assessment: Tolerated treatment well  Notes pinching in L levator scapulae region with MTP/LTPs  No pain in L shoulder/upper arm  No pain with tband IR/ER exercises  Notes TTP in L Levator scapulae insertion; relieved with self levator stretch  Pt noted n/t in L hand with L shoulder PROM flexion at about 120*; resolved at rest  Pinching pain L shoulder with abduction at about 110*  ER increased n/t in L hand and wrist in 90/90 position  IR WFL without pain/sx  + ULTT A with increased n/t in median nerve distribution L hand at end range  Performed median nerve sliders in supine to tolerance  Also trialed L GHJ inferior glide mobs grades 3-4 as tolerated  Pt demonstrated tolerance for full end range flexion without sx in L hand, WFL scaption without nerve sx (pinch L shoulder at end range), and WNL IR/ER without nerve sx L hand after tx  No sx at rest at end of session  Reviewed cervicothoracic postural awareness and self UT and levator scapulae stretching for HEP  Will assess c-spine further NV as tolerated  No complaints end of tx  Patient demonstrated fatigue post treatment and would benefit from continued PT      Plan: Continue per plan of care  Progress treatment as tolerated         Precautions: Hx of supraventricular tachycardia       Re-eval Date:     Date 5/5 5/10 Visit Count 1 2      FOTO 5/5       Pain In See IE 0/10      Pain Out See IE  0/10          Manuals 5/5 5/10       PROM to L shoulder in all directions to tolerance  PROM L shoulder all planes with end range stretch as tolerated,   L median nerve sliders to tolerance supine   L inferior GHJ glides grades 3-4 as nitza  15' total        STM to L UT and L parascapular muscles for trigger point release   NV   Consider GIASTM L levator scap region                          Neuro Re-Ed                                                                        Ther Ex         UBE        UT stretch       Levator scap stretch       Wall slides     Table slides     Iso ER/IR      Serratus punches       SL ER      scap retractions       Cervical retractions    MTP Pulls     LTP pulls         Prone I'/T'/Y's     Prone row        X30"                  X10"    3x5"                     120 rpm alt 5'     4x30" L UT    Cross body posterior capsule stretch 4x30"     Levator stretch 4x30" L    Tband IR/ER  2x10/5" ea red                            Green 2x10/5"    1x10/5" green                                                                       Ther Activity                           Gait Training                           Modalities  deferred

## 2021-05-12 ENCOUNTER — OFFICE VISIT (OUTPATIENT)
Dept: PHYSICAL THERAPY | Facility: CLINIC | Age: 56
End: 2021-05-12
Payer: COMMERCIAL

## 2021-05-12 DIAGNOSIS — M75.32 CALCIFIC TENDONITIS OF LEFT SHOULDER: Primary | ICD-10-CM

## 2021-05-12 PROCEDURE — 97140 MANUAL THERAPY 1/> REGIONS: CPT

## 2021-05-12 PROCEDURE — 97110 THERAPEUTIC EXERCISES: CPT

## 2021-05-12 NOTE — PROGRESS NOTES
Daily Note     Today's date: 2021  Patient name: Jayden Nicole  : 1965  MRN: [id number]  Referring provider: Tika Barroso MD  Dx:   Encounter Diagnosis     ICD-10-CM    1  Calcific tendonitis of left shoulder  M75 32        Start Time: 1530  Stop Time: 1630  Total time in clinic (min): 60 minutes    Subjective: "I don't have pain during work  I feel it in my shoulder, it's throbbing "      Objective: See treatment diary below      Assessment: Tolerated treatment fair  Palpable, tender spasm noted L UT, able to decrease with STM  Increased N/T into L hand with shoulder PROM to end range, no significant change in symptoms after nerve glides  Trial of cervical distraction and stretching, per PT, with relief noted  Will trial GIASTM next session if prolonged relief noted  No pain reported at end of session  Patient demonstrated fatigue post treatment and would benefit from continued PT      Plan: Continue per plan of care  Progress treatment as tolerated         Precautions: Hx of supraventricular tachycardia       Re-eval Date:     Date 5/5 5/10 5/12     Visit Count 1 2 3     FOTO        Pain In See IE 0/10 0     Pain Out See IE  0/10 0         Manuals 5/5 5/10 5/12     PROM to L shoulder in all directions to tolerance  PROM L shoulder all planes with end range stretch as tolerated,   L median nerve sliders to tolerance supine   L inferior GHJ glides grades 3-4 as nitza  15' total  PROM L shoulder all planes with end range stretch as tolerated,   L median nerve sliders to tolerance supine   L inferior GHJ glides grades 3-4 as nitza; cervical distraction, L UT stretch  15 total      STM to L UT and L parascapular muscles for trigger point release   NV   Consider GIASTM L levator scap region  STM to L UT and L bicep region 10'  Trial GIASTM                     Neuro Re-Ed                                                                Ther Ex        UBE        UT stretch       Levator scap stretch Wall slides     Table slides     Iso ER/IR      Serratus punches       SL ER      scap retractions       Cervical retractions    MTP Pulls     LTP pulls         Prone I'/T'/Y's     Prone row        X30"                  X10"    3x5"                     120 rpm alt 5'     4x30" L UT    Cross body posterior capsule stretch 4x30"     Levator stretch 4x30" L    Tband IR/ER  2x10/5" ea red                            Green 2x10/5"    1x10/5" green  120 rpm alt 5'     4x30" L UT    Cross body posterior capsule stretch 4x30"     Levator stretch 4x30" L    Tband IR/ER  2x10/5" ea red                            Green 2x10/5"    2x10/5" green                                                              Ther Activity                        Gait Training                        Modalities  deferred

## 2021-05-18 ENCOUNTER — OFFICE VISIT (OUTPATIENT)
Dept: PHYSICAL THERAPY | Facility: CLINIC | Age: 56
End: 2021-05-18
Payer: COMMERCIAL

## 2021-05-18 DIAGNOSIS — M75.32 CALCIFIC TENDONITIS OF LEFT SHOULDER: Primary | ICD-10-CM

## 2021-05-18 PROCEDURE — 97140 MANUAL THERAPY 1/> REGIONS: CPT

## 2021-05-18 NOTE — PROGRESS NOTES
Daily Note     Today's date: 2021  Patient name: Jayden Nicole  : 1965  MRN: [id number]  Referring provider: Tika Barroso MD  Dx:   Encounter Diagnosis     ICD-10-CM    1  Calcific tendonitis of left shoulder  M75 32        Start Time: 1000  Stop Time: 1100  Total time in clinic (min): 60 minutes    Subjective: "I don't have pain right now; I am feeling pretty good "      Objective: See treatment diary below      Assessment: Tolerated treatment well  Pt noted posterior shoulder discomfort with ER, resolved at end of exercise  N/T noted in L hand with PROM of shoulder  Relief noted with SOR; tightness with restrictions noted with cervical rotations  Will continue to monitor and progress as able  Patient demonstrated fatigue post treatment and would benefit from continued PT      Plan: Continue per plan of care  Progress treatment as tolerated         Precautions: Hx of supraventricular tachycardia       Re-eval Date:     Date 5/5 5/10 5/12 5/18    Visit Count 1 2 3 4    FOTO        Pain In See IE 0/10 0 0    Pain Out See IE  010 0 0        Manuals 5/5 5/10 5/12 5/18    PROM to L shoulder in all directions to tolerance  PROM L shoulder all planes with end range stretch as tolerated,   L median nerve sliders to tolerance supine   L inferior GHJ glides grades 3-4 as nitza  15' total  PROM L shoulder all planes with end range stretch as tolerated,   L median nerve sliders to tolerance supine   L inferior GHJ glides grades 3-4 as nitza; cervical distraction, L UT stretch  15 total  PROM L shoulder all planes with end range stretch as tolerated,   L median nerve sliders to tolerance supine   L inferior GHJ glides grades 3-4 as nitza; cervical distraction, L UT stretch  15 tota    STM to L UT and L parascapular muscles for trigger point release   NV   Consider GIASTM L levator scap region  STM to L UT and L bicep region 10'  STM to L UT and L bicep region 10' Trial GIASTM  Trial GIASTM Neuro Re-Ed                                                                Ther Ex        UBE        UT stretch       Levator scap stretch       Wall slides     Table slides     Iso ER/IR      Serratus punches       SL ER      scap retractions       Cervical retractions    MTP Pulls     LTP pulls         Prone I'/T'/Y's     Prone row        X30"                  X10"    3x5"                     120 rpm alt 5'     4x30" L UT    Cross body posterior capsule stretch 4x30"     Levator stretch 4x30" L    Tband IR/ER  2x10/5" ea red                            Green 2x10/5"    1x10/5" green  120 rpm alt 5'     4x30" L UT    Cross body posterior capsule stretch 4x30"     Levator stretch 4x30" L    Tband IR/ER  2x10/5" ea red                            Green 2x10/5"    2x10/5" green    120 rpm alt 10'     4x30" L UT    Cross body posterior capsule stretch 4x30"     Levator stretch 4x30" L                  Tband IR/ER  2x10/5" ea red    Green 2x10/5"    2x10/5" green                                                            Ther Activity                        Gait Training                        Modalities  deferred

## 2021-05-19 ENCOUNTER — OFFICE VISIT (OUTPATIENT)
Dept: PHYSICAL THERAPY | Facility: CLINIC | Age: 56
End: 2021-05-19
Payer: COMMERCIAL

## 2021-05-19 DIAGNOSIS — M75.32 CALCIFIC TENDONITIS OF LEFT SHOULDER: Primary | ICD-10-CM

## 2021-05-19 PROCEDURE — 97110 THERAPEUTIC EXERCISES: CPT

## 2021-05-19 PROCEDURE — 97140 MANUAL THERAPY 1/> REGIONS: CPT

## 2021-05-19 NOTE — PROGRESS NOTES
Daily Note     Today's date: 2021  Patient name: Iliana Beard  : 1965  MRN: [id number]  Referring provider: Siri Everett MD  Dx:   Encounter Diagnosis     ICD-10-CM    1  Calcific tendonitis of left shoulder  M75 32                   Subjective: "I didn't have pain during work today or now "      Objective: See treatment diary below      Assessment: Tolerated treatment well  Increased reps on tband exercises without incident  Trial of GIASTM to B UT/levator region; consent reviewed and signed  Muscle restrictions noted in area,  Decreased after treatment and increased mobility to area  Will continue to monitor and progress as able  Patient demonstrated fatigue post treatment and would benefit from continued PT      Plan: Continue per plan of care  Progress treatment as tolerated  Precautions: Hx of supraventricular tachycardia       Re-eval Date:     Date 5/5 5/10 5/12 5/18 5/19   Visit Count 1 2 3 4 5   FOTO        Pain In See IE 0/10 0 0    Pain Out See IE  010 0 0      GIASTM constant signed and reviewed  Pt encouraged to hydrate throughout the date   21  Manuals 5/5 5/10 5/12 5/18    PROM to L shoulder in all directions to tolerance  PROM L shoulder all planes with end range stretch as tolerated,   L median nerve sliders to tolerance supine   L inferior GHJ glides grades 3-4 as nitza  15' total  PROM L shoulder all planes with end range stretch as tolerated,   L median nerve sliders to tolerance supine   L inferior GHJ glides grades 3-4 as nitza; cervical distraction, L UT stretch  15 total  PROM L shoulder all planes with end range stretch as tolerated,   L median nerve sliders to tolerance supine   L inferior GHJ glides grades 3-4 as nitza; cervical distraction, L UT stretch  15 tota    STM to L UT and L parascapular muscles for trigger point release   NV   Consider GIASTM L levator scap region  STM to L UT and L bicep region 10'  STM to L UT and L bicep region 10' Trial GIASTM Trial LEILANI GT#4 scanning, sweeping, fanning to B UT, levators  10'                   Neuro Re-Ed                                                                Ther Ex        UBE        UT stretch       Levator scap stretch       Wall slides     Table slides     Iso ER/IR      Serratus punches       SL ER      scap retractions       Cervical retractions    MTP Pulls     LTP pulls         Prone I'/T'/Y's     Prone row        X30"                  X10"    3x5"                     120 rpm alt 5'     4x30" L UT    Cross body posterior capsule stretch 4x30"     Levator stretch 4x30" L    Tband IR/ER  2x10/5" ea red                            Green 2x10/5"    1x10/5" green  120 rpm alt 5'     4x30" L UT    Cross body posterior capsule stretch 4x30"     Levator stretch 4x30" L    Tband IR/ER  2x10/5" ea red                            Green 2x10/5"    2x10/5" green    120 rpm alt 10'     4x30" L UT    Cross body posterior capsule stretch 4x30"     Levator stretch 4x30" L                  Tband IR/ER  2x10/5" ea red    Green 2x10/5"    2x10/5" green 120 rpm alt 10'                                           Tband IR/ER  3x10/5" ea red    Green 3x10/5"    3x10/5" green                                                           Ther Activity                        Gait Training                        Modalities  deferred

## 2021-05-25 ENCOUNTER — OFFICE VISIT (OUTPATIENT)
Dept: PHYSICAL THERAPY | Facility: CLINIC | Age: 56
End: 2021-05-25
Payer: COMMERCIAL

## 2021-05-25 DIAGNOSIS — M75.32 CALCIFIC TENDONITIS OF LEFT SHOULDER: Primary | ICD-10-CM

## 2021-05-25 PROCEDURE — 97110 THERAPEUTIC EXERCISES: CPT

## 2021-05-25 PROCEDURE — 97140 MANUAL THERAPY 1/> REGIONS: CPT

## 2021-05-25 NOTE — PROGRESS NOTES
Daily Note     Today's date: 2021  Patient name: Sofi Fitzgerald  : 1965  MRN: [id number]  Referring provider: Guille Cox MD  Dx:   Encounter Diagnosis     ICD-10-CM    1  Calcific tendonitis of left shoulder  M75 32        Start Time: 1530  Stop Time: 1615  Total time in clinic (min): 45 minutes    Subjective: "I feel great  I don't have pain  I was sore after last time but it went away "      Objective: See treatment diary below      Assessment: Tolerated treatment well  Pt progressing well with therapy  No symptoms throughout session or using arm at work  Pain free motion in all directions noted  Minimal UT restrictions noted  Requested to decrease to 1x/week with possible discharge next week  Will discuss with PT  Will continue to monitor and progress as able  Patient demonstrated fatigue post treatment and would benefit from continued PT      Plan: Continue per plan of care  Progress treatment as tolerated  Discuss with PT possible discharge next week  Precautions: Hx of supraventricular tachycardia       Re-eval Date:     Date        Visit Count 6       FOTO        Pain In 0       Pain Out          LEILANI odell signed and reviewed  Pt encouraged to hydrate throughout the date   21  Manuals        PROM to L shoulder in all directions to tolerance        STM to L UT and L parascapular muscles for trigger point release  Trial GIASTM GT#4 scanning, sweeping, fanning to B UT, levators  10'                       Neuro Re-Ed                                                                Ther Ex        UBE        UT stretch       Levator scap stretch       Wall slides     Table slides     Iso ER/IR      Serratus punches       SL ER      scap retractions       Cervical retractions    MTP Pulls     LTP pulls         Prone I'/T'/Y's     Prone row  80 rpm alt 10'       Cross body posterior capsule stretch 4x30"     4x30" L UT         Levator stretch 4x30" L                            Tband IR/ER  3x10/5" ea red    Green 3x10/5"    3x10/5" green                                                                                                                 Ther Activity                        Gait Training                        Modalities

## 2021-05-27 ENCOUNTER — APPOINTMENT (OUTPATIENT)
Dept: PHYSICAL THERAPY | Facility: CLINIC | Age: 56
End: 2021-05-27
Payer: COMMERCIAL

## 2021-06-01 ENCOUNTER — EVALUATION (OUTPATIENT)
Dept: PHYSICAL THERAPY | Facility: CLINIC | Age: 56
End: 2021-06-01
Payer: COMMERCIAL

## 2021-06-01 DIAGNOSIS — M75.32 CALCIFIC TENDONITIS OF LEFT SHOULDER: Primary | ICD-10-CM

## 2021-06-01 PROCEDURE — 97110 THERAPEUTIC EXERCISES: CPT | Performed by: PHYSICAL MEDICINE & REHABILITATION

## 2021-06-01 NOTE — PROGRESS NOTES
PT Discharge    Today's date: 2021  Patient name: Deidre Melissa  : 1965  MRN: [id number]  Referring provider: Jimmye Dakin, MD  Dx:   Encounter Diagnosis     ICD-10-CM    1  Calcific tendonitis of left shoulder  M75 32                   Assessment  Assessment details: Deidre Melissa is a 64 y o  female presenting to [place] [id number] [facility] for acute L shoulder pain  She has been compliant with OPPT to date, attending 7 total tx sessions  Overall she has noted good progress with reported resolution of shoulder/arm pain as of late  She notes she has been able to resume her work and ADLs without that "sharp" shoulder pain  Notes she also doesn't have any sx into LUE as she had before  Denies neck pain  Notes improvements in L UE ROM and strength as well with ADLs  Reports I with HEP  She demonstrates improvements in AROM of her neck and L shoulder without pain/sx  Her quality of ROM has also improved without signs of apprehension or mm guarding  She continues with strength deficits of L shoulder vs R, however without pain/sx  She notes she feels she has returned to PLOF  At this time, since she is I with HEP and reports her pain has resolved, she wishes to d/c to HEP  HEP was updated, issued and reviewed with no questions/concerns noted  She will be d/c to HEP at this time; to contact PT if any questions/concerns should arise  Thank you for your referral       Impairments: abnormal or restricted ROM, impaired physical strength, lacks appropriate home exercise program and poor posture     Symptom irritability: lowUnderstanding of Dx/Px/POC: good   Prognosis: good    Goals  STG - to be achieved in 3 weeks:  1  Patient will increase L shoulder flexion PROM by at least 10 degrees in order to 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  - met   2   Patient will increase L shoulder flexion and ER muscle strengths by at least one muscle grade to be able to lift objects of at least 10lbs at work  - partially met   3  Patient will decrease max pain score by at least 1 grade in order to sleep 5 hours throughout the night before awaking due to pain  - met     LTG- to be achieved in 6 weeks:   1  Patient will increase L shoulder PROM to be [facility] to increase her overall functional mobility to be independent in activities at work  - met  2  Patient will increase L shoulder strength to be WFL to be able to lift objects at work with no restrictions  - partially met   3  Patient will decrease max pain score to be 1/10 in order to report overall quality of life to be excellent  - met     Plan  Planned therapy interventions: home exercise program  Treatment plan discussed with: patient        Subjective Evaluation    History of Present Illness  Mechanism of injury: Pt notes overall [facility]  Notes as of late, she has been "free of pain"  Notes reduced mm tension in her L shoulder/scapular region  No longer has that "weird sensation" or n/t into L hand/arm as before  Notes improved ROM and strength of L UE/shoulder  Pleased with her progress  No new sx/complaints  Notes she "still has a little" discomfort in L shoulder/UT region if she "does too much" or "after a whole day of working"; however the "pain is not there like it was before"  Does not feel she needs to return to the doctor as her sx are much improved  Has been compliant with HEP without incident  Feels she is ready to transition to HEP at this time and would like to d/c after today's session  No questions/concerns noted  No longer taking medication for her shoulder     Quality of life: excellent    Pain  Current pain ratin  At best pain ratin  At worst pain ratin  Location: superior posterior aspect of L shoulder  Progression: improved    Social Support  Steps to enter house: yes  Stairs in house: no   Lives in: multiple-level home  Lives with: spouse    Employment status: working (housekeeping at hospital )  Hand dominance: right      Diagnostic Tests  X-ray: abnormal  CT scan: abnormal    FCE comments: Calcific tendonitis of L infraspinatus tendon Treatments  Previous treatment: medication  Current treatment: medication and physical therapy  Patient Goals  Patient goals for therapy: decreased pain, increased motion, increased strength and independence with ADLs/IADLs  Patient's goals regarding treatment: independence at work to not rely on other coworkers         Objective     Postural Observations  Seated posture: fair  Standing posture: fair    Additional Postural Observation Details  Forward head/rounded shoulders  Increased thoracic kyphosis   B scapular depression and protraction with mild winging       Palpation   Left   No palpable tenderness to the biceps and supraspinatus  Hypertonic in the levator scapulae and upper trapezius  Tenderness of the middle trapezius and rhomboids  Additional Palpation Details  L UT very tight with muscle guarding - improved  Will continue to monitor     Cervical/Thoracic Screen   Cervical range of motion within normal limits with the following exceptions: Cervical flexion 35 degrees slight pain at medial border of scap   Cervical extension 55 degrees   Cervical SB to L 45 degrees   Cervical SB to R 30 degrees (stiff and painful)   Cervial rotation 55 degrees to L   Cervical rotation 35 degrees to R (Stiff and painful in L UT)   Update 6/1: See cervical section for details; no pain with AROM this date      Active Range of Motion   Left Shoulder   Flexion: Left shoulder active forward flexion: Full ROM; no compensation   WFL  Abduction: Left shoulder active abduction: Full ROM no compensation  WFL  External rotation BTH: WFL  Internal rotation BTB: WFL    Additional Active Range of Motion Details  WFL AROM all planes c-spine   Tightness noted L UT into L middle trap region at end range flexion   Tightness base of c-spine with end range extension  Tightness R/L UT with end range side bending   No radicular sx with AROM or with gentle overpressure applied at end ranges       Heavily compensates with UTs bilaterally - resolved   No pain with or following AROM       Passive Range of Motion   Left Shoulder   Flexion: WFL  Abduction: WFL  External rotation 90°: WFL  Internal rotation 90°: WFL    Additional Passive Range of Motion Details  Empty endfeel with both flexion and abduction due to muscle guarding - resolved  Will continue to monitor     Strength/Myotome Testing     Left Shoulder     Planes of Motion   Flexion: 4+   Abduction: 4   External rotation at 0°: 4   Internal rotation at 0°: 4+     Isolated Muscles   Biceps: 4   Triceps: 4+     Additional Strength Details  No pain with MMT testing      Tests   Cervical   Negative vertical compression  Left   Positive Spurling's Test A  Right   Positive Spurling's Test A  Left Shoulder   Negative horn blower, lift-off, Neer's and Speed's  Lumbar   Negative vertical compression  Additional Tests Details  + Spurling's R and L for discomfort L UT region and base of c-spine only; no radicular sx               Precautions: Hx of supraventricular tachycardia       Re-eval Date: 6/4    Date 5/25 6/1      Visit Count 6 7      FOTO        Pain In 0 0/10      Pain Out  0/10        GIASTM cass signed and reviewed  Pt encouraged to hydrate throughout the date   5/19/21  Manuals 5/25 6/1      PROM to L shoulder in all directions to tolerance        STM to L UT and L parascapular muscles for trigger point release  Trial GIASTM GT#4 scanning, sweeping, fanning to B UT, levators  10'                       Neuro Re-Ed                                                                Ther Ex        UBE        UT stretch       Levator scap stretch       Wall slides     Table slides     Iso ER/IR        Serratus punches       SL ER      scap retractions       Cervical retractions    MTP Pulls     LTP pulls         Prone I'/T'/Y's     Prone row 80 rpm alt 10'       Cross body posterior capsule stretch 4x30"     4x30" L UT         Levator stretch 4x30" L                          Tband IR/ER  3x10/5" ea red    Green 3x10/5"    3x10/5" green         Nustep L 2 10'       Cross body posterior capsule stretch 4x30"     4x30" L UT         Levator stretch 4x30" L      1# 3x10         1# 3x10                 Tband IR/ER green 3x10/5"ea    Tband MTP/LTP  3x10/5" ea          2# 3x10                                                                                                         Ther Activity                        Gait Training                        Modalities                                6/1 - HEP was issued and reviewed this date for above noted exercises in preparation for d/c  Pt demonstrated understanding without incident and without questions/concerns  Will continue to update upcoming

## 2021-06-03 ENCOUNTER — APPOINTMENT (OUTPATIENT)
Dept: PHYSICAL THERAPY | Facility: CLINIC | Age: 56
End: 2021-06-03
Payer: COMMERCIAL

## 2021-06-14 ENCOUNTER — TELEPHONE (OUTPATIENT)
Dept: FAMILY MEDICINE CLINIC | Facility: CLINIC | Age: 56
End: 2021-06-14

## 2021-06-14 NOTE — TELEPHONE ENCOUNTER
Called and spoke with patient  She states she feels fine during the day, but her bp gets low at night (94/69)  She states she is not dizzy but feels like she is going to fall over when she walks  She states she doesn't feel like she will fall though  She states her pulse normally runs in the 90's  Patient states she spoke with a PA at her work and they mentioned she should get her cortisol checked  Please advise

## 2021-06-14 NOTE — TELEPHONE ENCOUNTER
Pt calling in her blood pressure not right  She said during the day she is fine 122/76 but had night she feels funny in the head  It is running 96/69 at night    She is asking for an appt for tomorrow with Denzel Concepcion we only have same days

## 2021-06-14 NOTE — TELEPHONE ENCOUNTER
Does she want an appointment to discuss? You can give her one of my same days  If she just wants a cortisol level ordered- you can do that for her  Let her know it has to be drawn between 7am-9am  Thanks!

## 2021-06-15 ENCOUNTER — OFFICE VISIT (OUTPATIENT)
Dept: FAMILY MEDICINE CLINIC | Facility: CLINIC | Age: 56
End: 2021-06-15
Payer: COMMERCIAL

## 2021-06-15 VITALS
DIASTOLIC BLOOD PRESSURE: 68 MMHG | HEART RATE: 82 BPM | WEIGHT: 123.4 LBS | BODY MASS INDEX: 23.3 KG/M2 | OXYGEN SATURATION: 98 % | TEMPERATURE: 98.8 F | SYSTOLIC BLOOD PRESSURE: 108 MMHG | RESPIRATION RATE: 20 BRPM | HEIGHT: 61 IN

## 2021-06-15 DIAGNOSIS — D50.8 IRON DEFICIENCY ANEMIA SECONDARY TO INADEQUATE DIETARY IRON INTAKE: ICD-10-CM

## 2021-06-15 DIAGNOSIS — R42 DIZZINESS: Primary | ICD-10-CM

## 2021-06-15 DIAGNOSIS — I47.1 SVT (SUPRAVENTRICULAR TACHYCARDIA) (HCC): ICD-10-CM

## 2021-06-15 DIAGNOSIS — I95.89 OTHER SPECIFIED HYPOTENSION: ICD-10-CM

## 2021-06-15 PROCEDURE — 3008F BODY MASS INDEX DOCD: CPT | Performed by: NURSE PRACTITIONER

## 2021-06-15 PROCEDURE — 99214 OFFICE O/P EST MOD 30 MIN: CPT | Performed by: NURSE PRACTITIONER

## 2021-06-15 NOTE — PROGRESS NOTES
[address] [facility]        NAME: Alice Rogers is a 64 y o  female  : 1965    MRN: [id number]  DATE: Kat 15, 2021  TIME: 10:29 AM    Assessment and Plan   Dizziness [R42]  1  Dizziness  Cortisol Level, AM Specimen    Comprehensive metabolic panel   2  Other specified hypotension  Cortisol Level, AM Specimen    Comprehensive metabolic panel   3  Iron deficiency anemia secondary to inadequate dietary iron intake  Iron Panel (Includes Ferritin, Iron Sat%, Iron, and TIBC)    CBC and differential   4  SVT (supraventricular tachycardia) [facility])           Patient Instructions     Patient Instructions   Get bloodwork done between 7-9am  Consider increasing sodium (salt) into your diet, consider you water intake- either too much or too little as discussed for reasons for low blood pressure  Keep detailed diary of symptoms, blood pressure, and sodium intake for trial and error purposes to figure out if increasing salt is beneficial to improving dizziness  Consider cardiology consult if symptoms continue despite trying these options  Call if any problems/concerns/questions          Chief Complaint     Chief Complaint   Patient presents with    Multiple Concerns     Hypotension, At night dizzinessright before sleep,         History of Present Illness       Here to discuss dizziness only at night  When this happens she takes her blood pressure- the other night during an episode it was 90/60, 87/66  She eats very healthy- reviewed what she eats in a day  She drinks 32 oz of water during the day and another 16-32 oz at night  No added salt in diet, very little fat and carbohydrates  Review of Systems   Review of Systems   Constitutional: Negative for activity change, diaphoresis, fatigue and fever  HENT: Negative for congestion, facial swelling, hearing loss, rhinorrhea, sinus pressure, sinus pain, sneezing, sore throat and voice change  Eyes: Negative for discharge and visual disturbance  Respiratory: Negative for cough, choking, chest tightness, shortness of breath, wheezing and stridor  Cardiovascular: Negative for chest pain, palpitations and leg swelling  Gastrointestinal: Negative for abdominal distention, abdominal pain, constipation, diarrhea, nausea and vomiting  Endocrine: Negative for polydipsia, polyphagia and polyuria  Genitourinary: Negative for difficulty urinating, dysuria, frequency and urgency  Musculoskeletal: Negative for arthralgias, back pain, gait problem, joint swelling, myalgias, neck pain and neck stiffness  Skin: Negative for color change, rash and wound  Neurological: Positive for dizziness (denies room spinning dizziness, just off balance and only at night- none right now)  Negative for syncope, speech difficulty, weakness, light-headedness and headaches  Hematological: Negative for adenopathy  Does not bruise/bleed easily  Psychiatric/Behavioral: Negative for agitation, behavioral problems, confusion, hallucinations, sleep disturbance and suicidal ideas  The patient is not nervous/anxious            Current Medications       Current Outpatient Medications:     b complex vitamins capsule, Take 1 capsule by mouth daily, Disp: , Rfl:     cholecalciferol (VITAMIN D3) 1,000 units tablet, Take 2,000 Units by mouth daily , Disp: , Rfl:     omeprazole (PriLOSEC) 20 mg delayed release capsule, Take 1 capsule (20 mg total) by mouth daily before breakfast, Disp: 90 capsule, Rfl: 3    Black Cohosh 40 MG CAPS, Take by mouth, Disp: , Rfl:     EVENING PRIMROSE OIL PO, Take by mouth, Disp: , Rfl:     Current Allergies     Allergies as of 06/15/2021 - Reviewed 06/15/2021   Allergen Reaction Noted    Penicillins GI Intolerance 04/22/2020            The following portions of the patient's history were reviewed and updated as appropriate: allergies, current medications, past family history, past medical history, past social history, past surgical history and problem list      Past Medical History:   Diagnosis Date    Allergic        Past Surgical History:   Procedure Laterality Date    CARDIAC ELECTROPHYSIOLOGY STUDY AND ABLATION       SECTION      TUBAL LIGATION         Family History   Problem Relation Age of Onset    No Known Problems Mother     No Known Problems Father     No Known Problems Sister     No Known Problems Daughter     No Known Problems Maternal Grandmother     No Known Problems Paternal Grandmother     No Known Problems Sister     No Known Problems Sister     No Known Problems Sister     No Known Problems Maternal Aunt     No Known Problems Paternal Aunt          Medications have been verified  Objective   /68   Pulse 82   Temp 98 8 °F (37 1 °C) (Tympanic)   Resp 20   Ht 5' 1" (1 549 m)   Wt 56 kg (123 lb 6 4 oz)   LMP 2021 (Approximate)   SpO2 98%   BMI 23 32 kg/m²        Physical Exam     Physical Exam  Vitals and nursing note reviewed  Constitutional:       General: She is not in acute distress  Appearance: Normal appearance  She is well-developed  She is not diaphoretic  Neck:      Thyroid: No thyromegaly  Trachea: No tracheal deviation  Cardiovascular:      Rate and Rhythm: Normal rate and regular rhythm  Heart sounds: Normal heart sounds  No murmur heard  Pulmonary:      Effort: Pulmonary effort is normal  No respiratory distress  Breath sounds: Normal breath sounds  No wheezing  Musculoskeletal:         General: No tenderness or deformity  Normal range of motion  Cervical back: Normal range of motion and neck supple  Skin:     General: Skin is warm and dry  Findings: No erythema or rash  Neurological:      Mental Status: She is alert and oriented to person, place, and time  Psychiatric:         Mood and Affect: Mood normal          Behavior: Behavior normal  Behavior is cooperative  Thought Content:  Thought content normal          Judgment: Judgment normal

## 2021-06-17 ENCOUNTER — APPOINTMENT (OUTPATIENT)
Dept: LAB | Facility: HOSPITAL | Age: 56
End: 2021-06-17
Payer: COMMERCIAL

## 2021-06-17 DIAGNOSIS — I95.89 OTHER SPECIFIED HYPOTENSION: ICD-10-CM

## 2021-06-17 DIAGNOSIS — R42 DIZZINESS: ICD-10-CM

## 2021-06-17 DIAGNOSIS — D50.8 IRON DEFICIENCY ANEMIA SECONDARY TO INADEQUATE DIETARY IRON INTAKE: ICD-10-CM

## 2021-06-17 LAB
ALBUMIN SERPL BCP-MCNC: 4.3 G/DL (ref 3.5–5.7)
ALP SERPL-CCNC: 48 U/L (ref 40–150)
ALT SERPL W P-5'-P-CCNC: 13 U/L (ref 7–52)
ANION GAP SERPL CALCULATED.3IONS-SCNC: 8 MMOL/L (ref 4–13)
AST SERPL W P-5'-P-CCNC: 15 U/L (ref 13–39)
BASOPHILS # BLD AUTO: 0.1 THOUSANDS/ΜL (ref 0–0.1)
BASOPHILS NFR BLD AUTO: 1 % (ref 0–2)
BILIRUB SERPL-MCNC: 0.4 MG/DL (ref 0.2–1)
BUN SERPL-MCNC: 25 MG/DL (ref 7–25)
CALCIUM SERPL-MCNC: 9.2 MG/DL (ref 8.6–10.5)
CHLORIDE SERPL-SCNC: 101 MMOL/L (ref 98–107)
CO2 SERPL-SCNC: 29 MMOL/L (ref 21–31)
CORTIS AM PEAK SERPL-MCNC: 9.9 UG/DL (ref 4.2–22.4)
CREAT SERPL-MCNC: 0.81 MG/DL (ref 0.6–1.2)
EOSINOPHIL # BLD AUTO: 0.2 THOUSAND/ΜL (ref 0–0.61)
EOSINOPHIL NFR BLD AUTO: 4 % (ref 0–5)
ERYTHROCYTE [DISTWIDTH] IN BLOOD BY AUTOMATED COUNT: 13.4 % (ref 11.5–14.5)
FERRITIN SERPL-MCNC: 60 NG/ML (ref 8–388)
GFR SERPL CREATININE-BSD FRML MDRD: 81 ML/MIN/1.73SQ M
GLUCOSE P FAST SERPL-MCNC: 84 MG/DL (ref 65–99)
HCT VFR BLD AUTO: 41.8 % (ref 42–47)
HGB BLD-MCNC: 14 G/DL (ref 12–16)
IRON SATN MFR SERPL: 24 %
IRON SERPL-MCNC: 82 UG/DL (ref 50–170)
LYMPHOCYTES # BLD AUTO: 1.7 THOUSANDS/ΜL (ref 0.6–4.47)
LYMPHOCYTES NFR BLD AUTO: 34 % (ref 21–51)
MCH RBC QN AUTO: 31 PG (ref 26–34)
MCHC RBC AUTO-ENTMCNC: 33.5 G/DL (ref 31–37)
MCV RBC AUTO: 93 FL (ref 81–99)
MONOCYTES # BLD AUTO: 0.4 THOUSAND/ΜL (ref 0.17–1.22)
MONOCYTES NFR BLD AUTO: 8 % (ref 2–12)
NEUTROPHILS # BLD AUTO: 2.7 THOUSANDS/ΜL (ref 1.4–6.5)
NEUTS SEG NFR BLD AUTO: 53 % (ref 42–75)
PLATELET # BLD AUTO: 280 THOUSANDS/UL (ref 149–390)
PMV BLD AUTO: 7.4 FL (ref 8.6–11.7)
POTASSIUM SERPL-SCNC: 3.8 MMOL/L (ref 3.5–5.5)
PROT SERPL-MCNC: 6.4 G/DL (ref 6.4–8.9)
RBC # BLD AUTO: 4.52 MILLION/UL (ref 3.9–5.2)
SODIUM SERPL-SCNC: 138 MMOL/L (ref 134–143)
TIBC SERPL-MCNC: 342 UG/DL (ref 250–450)
WBC # BLD AUTO: 5.1 THOUSAND/UL (ref 4.8–10.8)

## 2021-06-17 PROCEDURE — 80053 COMPREHEN METABOLIC PANEL: CPT

## 2021-06-17 PROCEDURE — 85025 COMPLETE CBC W/AUTO DIFF WBC: CPT

## 2021-06-17 PROCEDURE — 82533 TOTAL CORTISOL: CPT

## 2021-06-17 PROCEDURE — 82728 ASSAY OF FERRITIN: CPT

## 2021-06-17 PROCEDURE — 83540 ASSAY OF IRON: CPT

## 2021-06-17 PROCEDURE — 83550 IRON BINDING TEST: CPT

## 2021-06-17 PROCEDURE — 36415 COLL VENOUS BLD VENIPUNCTURE: CPT

## 2021-07-19 DIAGNOSIS — Z11.59 SCREENING FOR VIRAL DISEASE: Primary | ICD-10-CM

## 2021-09-13 ENCOUNTER — OFFICE VISIT (OUTPATIENT)
Dept: FAMILY MEDICINE CLINIC | Facility: CLINIC | Age: 56
End: 2021-09-13
Payer: COMMERCIAL

## 2021-09-13 VITALS
SYSTOLIC BLOOD PRESSURE: 98 MMHG | HEIGHT: 61 IN | OXYGEN SATURATION: 99 % | HEART RATE: 89 BPM | BODY MASS INDEX: 23.22 KG/M2 | RESPIRATION RATE: 20 BRPM | DIASTOLIC BLOOD PRESSURE: 68 MMHG | WEIGHT: 123 LBS | TEMPERATURE: 98.8 F

## 2021-09-13 DIAGNOSIS — Z00.00 ANNUAL PHYSICAL EXAM: Primary | ICD-10-CM

## 2021-09-13 DIAGNOSIS — Z13.220 SCREENING CHOLESTEROL LEVEL: ICD-10-CM

## 2021-09-13 DIAGNOSIS — J30.9 ALLERGIC RHINITIS, UNSPECIFIED SEASONALITY, UNSPECIFIED TRIGGER: ICD-10-CM

## 2021-09-13 PROCEDURE — 3725F SCREEN DEPRESSION PERFORMED: CPT | Performed by: NURSE PRACTITIONER

## 2021-09-13 PROCEDURE — 99396 PREV VISIT EST AGE 40-64: CPT | Performed by: NURSE PRACTITIONER

## 2021-09-13 RX ORDER — FLUTICASONE PROPIONATE 50 MCG
1 SPRAY, SUSPENSION (ML) NASAL DAILY
Qty: 16 G | Refills: 11 | Status: SHIPPED | OUTPATIENT
Start: 2021-09-13

## 2021-09-13 NOTE — PROGRESS NOTES
[address] [place] [address] [facility]        NAME: Abbie Galarza is a 64 y o  female  : 1965    MRN: [id number]  DATE: 2021  TIME: 3:48 PM    Assessment and Plan   Annual physical exam [B07 48]  8  Annual physical exam  Comprehensive metabolic panel    CBC and differential   2  Allergic rhinitis, unspecified seasonality, unspecified trigger  fluticasone (FLONASE) 50 mcg/act nasal spray   3  Screening cholesterol level  Lipid panel         Patient Instructions     There are no Patient Instructions on file for this visit  Chief Complaint     Chief Complaint   Patient presents with    Physical Exam     Would like a script for a nasal spray,          History of Present Illness       Here for annual physical      Review of Systems   Review of Systems   Constitutional: Negative for activity change, diaphoresis, fatigue and fever  HENT: Negative for congestion, facial swelling, hearing loss, rhinorrhea, sinus pressure, sinus pain, sneezing, sore throat and voice change  Eyes: Negative for discharge and visual disturbance  Respiratory: Negative for cough, choking, chest tightness, shortness of breath, wheezing and stridor  Cardiovascular: Negative for chest pain, palpitations and leg swelling  Gastrointestinal: Negative for abdominal distention, abdominal pain, constipation, diarrhea, nausea and vomiting  Endocrine: Negative for polydipsia, polyphagia and polyuria  Genitourinary: Negative for difficulty urinating, dysuria, frequency and urgency  Musculoskeletal: Negative for arthralgias, back pain, gait problem, joint swelling, myalgias, neck pain and neck stiffness  Skin: Negative for color change, rash and wound  Neurological: Positive for dizziness (is improving)  Negative for syncope, speech difficulty, weakness, light-headedness and headaches  Hematological: Negative for adenopathy  Does not bruise/bleed easily     Psychiatric/Behavioral: Negative for agitation, behavioral problems, confusion, hallucinations, sleep disturbance and suicidal ideas  The patient is not nervous/anxious  Current Medications       Current Outpatient Medications:     b complex vitamins capsule, Take 1 capsule by mouth daily, Disp: , Rfl:     Black Cohosh 40 MG CAPS, Take by mouth, Disp: , Rfl:     cholecalciferol (VITAMIN D3) 1,000 units tablet, Take 2,000 Units by mouth daily , Disp: , Rfl:     EVENING PRIMROSE OIL PO, Take by mouth, Disp: , Rfl:     fluticasone (FLONASE) 50 mcg/act nasal spray, 1 spray into each nostril daily, Disp: 16 g, Rfl: 11    omeprazole (PriLOSEC) 20 mg delayed release capsule, Take 1 capsule (20 mg total) by mouth daily before breakfast (Patient not taking: Reported on 2021), Disp: 90 capsule, Rfl: 3    Current Allergies     Allergies as of 2021 - Reviewed 2021   Allergen Reaction Noted    Penicillins GI Intolerance 2020            The following portions of the patient's history were reviewed and updated as appropriate: allergies, current medications, past family history, past medical history, past social history, past surgical history and problem list      Past Medical History:   Diagnosis Date    Allergic        Past Surgical History:   Procedure Laterality Date    CARDIAC ELECTROPHYSIOLOGY STUDY AND ABLATION       SECTION      TUBAL LIGATION         Family History   Problem Relation Age of Onset    No Known Problems Mother     No Known Problems Father     No Known Problems Sister     No Known Problems Daughter     No Known Problems Maternal Grandmother     No Known Problems Paternal Grandmother     No Known Problems Sister     No Known Problems Sister     No Known Problems Sister     No Known Problems Maternal Aunt     No Known Problems Paternal Aunt          Medications have been verified          Objective   BP 98/68   Pulse 89   Temp 98 8 °F (37 1 °C) (Tympanic)   Resp 20   Ht 5' 1" (1 549 m)   Wt 55 8 kg (123 lb)   LMP 01/01/2021 (Approximate)   SpO2 99%   BMI 23 24 kg/m²        Physical Exam     Physical Exam  Vitals and nursing note reviewed  Constitutional:       General: She is not in acute distress  Appearance: Normal appearance  She is well-developed  She is not diaphoretic  HENT:      Head: Normocephalic and atraumatic  Right Ear: Tympanic membrane, ear canal and external ear normal       Left Ear: Tympanic membrane, ear canal and external ear normal       Nose: Nose normal       Right Sinus: No maxillary sinus tenderness or frontal sinus tenderness  Left Sinus: No maxillary sinus tenderness or frontal sinus tenderness  Mouth/Throat:      Mouth: Mucous membranes are moist       Pharynx: Oropharynx is clear  Uvula midline  No oropharyngeal exudate  Eyes:      General:         Right eye: No discharge  Left eye: No discharge  Conjunctiva/sclera: Conjunctivae normal       Pupils: Pupils are equal, round, and reactive to light  Neck:      Thyroid: No thyromegaly  Trachea: No tracheal deviation  Cardiovascular:      Rate and Rhythm: Normal rate and regular rhythm  Heart sounds: Normal heart sounds  No murmur heard  No friction rub  No gallop  Pulmonary:      Effort: Pulmonary effort is normal  No respiratory distress  Breath sounds: Normal breath sounds  No wheezing or rales  Abdominal:      General: Bowel sounds are normal  There is no distension  Palpations: Abdomen is soft  There is no mass  Tenderness: There is no abdominal tenderness  There is no guarding or rebound  Musculoskeletal:         General: No tenderness or deformity  Normal range of motion  Cervical back: Normal range of motion and neck supple  Right lower leg: No edema  Left lower leg: No edema  Lymphadenopathy:      Cervical: No cervical adenopathy  Skin:     General: Skin is warm and dry  Findings: No erythema or rash     Neurological: Mental Status: She is alert and oriented to person, place, and time  Cranial Nerves: No cranial nerve deficit  Coordination: Coordination normal    Psychiatric:         Mood and Affect: Mood normal          Speech: Speech normal          Behavior: Behavior normal          Thought Content:  Thought content normal          Judgment: Judgment normal

## 2021-09-24 ENCOUNTER — OFFICE VISIT (OUTPATIENT)
Dept: OBGYN CLINIC | Facility: CLINIC | Age: 56
End: 2021-09-24
Payer: COMMERCIAL

## 2021-09-24 VITALS
HEIGHT: 61 IN | WEIGHT: 127 LBS | DIASTOLIC BLOOD PRESSURE: 79 MMHG | BODY MASS INDEX: 23.98 KG/M2 | SYSTOLIC BLOOD PRESSURE: 121 MMHG | HEART RATE: 72 BPM

## 2021-09-24 DIAGNOSIS — M65.4 TENOSYNOVITIS, DE QUERVAIN: Primary | ICD-10-CM

## 2021-09-24 PROCEDURE — 20550 NJX 1 TENDON SHEATH/LIGAMENT: CPT | Performed by: ORTHOPAEDIC SURGERY

## 2021-09-24 PROCEDURE — 1036F TOBACCO NON-USER: CPT | Performed by: ORTHOPAEDIC SURGERY

## 2021-09-24 PROCEDURE — 99203 OFFICE O/P NEW LOW 30 MIN: CPT | Performed by: ORTHOPAEDIC SURGERY

## 2021-09-24 PROCEDURE — 3008F BODY MASS INDEX DOCD: CPT | Performed by: ORTHOPAEDIC SURGERY

## 2021-09-24 RX ORDER — BUPIVACAINE HYDROCHLORIDE 2.5 MG/ML
1 INJECTION, SOLUTION EPIDURAL; INFILTRATION; INTRACAUDAL
Status: COMPLETED | OUTPATIENT
Start: 2021-09-24 | End: 2021-09-24

## 2021-09-24 RX ORDER — BETAMETHASONE SODIUM PHOSPHATE AND BETAMETHASONE ACETATE 3; 3 MG/ML; MG/ML
3 INJECTION, SUSPENSION INTRA-ARTICULAR; INTRALESIONAL; INTRAMUSCULAR; SOFT TISSUE
Status: COMPLETED | OUTPATIENT
Start: 2021-09-24 | End: 2021-09-24

## 2021-09-24 RX ADMIN — BUPIVACAINE HYDROCHLORIDE 1 ML: 2.5 INJECTION, SOLUTION EPIDURAL; INFILTRATION; INTRACAUDAL at 09:15

## 2021-09-24 RX ADMIN — BETAMETHASONE SODIUM PHOSPHATE AND BETAMETHASONE ACETATE 3 MG: 3; 3 INJECTION, SUSPENSION INTRA-ARTICULAR; INTRALESIONAL; INTRAMUSCULAR; SOFT TISSUE at 09:15

## 2021-09-24 NOTE — PROGRESS NOTES
Assessment/Plan:    No problem-specific Assessment & Plan notes found for this encounter  Diagnoses and all orders for this visit:    Tenosynovitis, de Quervain         the right 1st dorsal compartment was injected with Celestone and Marcaine  She tolerated procedure quite well  Return back 6 weeks for re-evaluation  If her condition changes, she will not hesitate to let us know  Subjective:      Patient ID: Donna Cardenas is a 64 y o  female  HPI      the patient is a 59-year-old right-hand-dominant female who works as a  for the hospital   For the past several months, she noticed pain along the radial side of her right wrist   She denies any numbness or tingling  She denies any fever chills  She has not had any injections  She denies any neck pain  The following portions of the patient's history were reviewed and updated as appropriate: allergies, current medications, past family history, past surgical history and problem list     Review of Systems   Constitutional: Negative for chills, fever and unexpected weight change  HENT: Negative for hearing loss, nosebleeds and sore throat  Eyes: Negative for pain, redness and visual disturbance  Respiratory: Negative for cough, shortness of breath and wheezing  Cardiovascular: Negative for chest pain, palpitations and leg swelling  Gastrointestinal: Negative for abdominal pain, nausea and vomiting  Endocrine: Negative for polydipsia and polyuria  Genitourinary: Negative for dysuria and hematuria  Musculoskeletal: Positive for arthralgias, joint swelling and myalgias  Negative for back pain, gait problem, neck pain and neck stiffness  As noted in HPI   Skin: Negative for rash and wound  Neurological: Negative for dizziness, numbness and headaches  Psychiatric/Behavioral: Negative for decreased concentration and suicidal ideas  The patient is not nervous/anxious            Objective:      /79 (BP Location: Right arm, Patient Position: Sitting, Cuff Size: Standard)   Pulse 72   Ht 5' 1" (1 549 m)   Wt 57 6 kg (127 lb)   LMP 01/01/2021 (Approximate)   BMI 24 00 kg/m²          Physical Exam          Neck was soft and supple  There is a negative axial compression test in her neck  Right upper extremity was neurovascular intact  Fingers are pink and mobile  Compartments are soft  There is a prominent 1st dorsal compartment  There is a positive Finkelstein test   No anatomic snuffbox tenderness  No grinding along the [facility] joint  There is a painful arc of motion throughout her right wrist    Hand/upper extremity injection: R extensor compartment 1  Universal Protocol:  Consent: Verbal consent obtained  Risks and benefits: risks, benefits and alternatives were discussed  Consent given by: patient  Patient understanding: patient states understanding of the procedure being performed  Test results: test results available and properly labeled  Site marked: the operative site was marked  Radiology Images displayed and confirmed   If images not available, report reviewed: imaging studies available    Supporting Documentation  Indications: diagnostic, joint swelling and pain   Procedure Details  Condition:de Quervain's tenosynovitis Site: R extensor compartment 1   Preparation: Patient was prepped and draped in the usual sterile fashion  Needle size: 25 G  Ultrasound guidance: no  Approach: Radial   Medications administered: 1 mL bupivacaine (PF) 0 25 %; 3 mg betamethasone acetate-betamethasone sodium phosphate 6 (3-3) mg/mL    Patient tolerance: patient tolerated the procedure well with no immediate complications  Dressing:  Sterile dressing applied

## 2021-12-21 ENCOUNTER — IMMUNIZATIONS (OUTPATIENT)
Dept: FAMILY MEDICINE CLINIC | Facility: HOSPITAL | Age: 56
End: 2021-12-21

## 2021-12-21 DIAGNOSIS — Z23 ENCOUNTER FOR IMMUNIZATION: Primary | ICD-10-CM

## 2021-12-21 PROCEDURE — 91306 COVID-19 MODERNA VACC 0.25 ML BOOSTER: CPT

## 2021-12-21 PROCEDURE — 0064A COVID-19 MODERNA VACC 0.25 ML BOOSTER: CPT

## 2022-04-07 ENCOUNTER — TELEPHONE (OUTPATIENT)
Dept: FAMILY MEDICINE CLINIC | Facility: CLINIC | Age: 57
End: 2022-04-07

## 2022-04-07 DIAGNOSIS — N39.0 URINARY TRACT INFECTION WITHOUT HEMATURIA, SITE UNSPECIFIED: Primary | ICD-10-CM

## 2022-04-07 RX ORDER — SULFAMETHOXAZOLE AND TRIMETHOPRIM 800; 160 MG/1; MG/1
1 TABLET ORAL EVERY 12 HOURS SCHEDULED
Qty: 14 TABLET | Refills: 0 | Status: SHIPPED | OUTPATIENT
Start: 2022-04-07 | End: 2022-04-14

## 2022-05-02 ENCOUNTER — OFFICE VISIT (OUTPATIENT)
Dept: OBGYN CLINIC | Facility: CLINIC | Age: 57
End: 2022-05-02
Payer: COMMERCIAL

## 2022-05-02 VITALS
SYSTOLIC BLOOD PRESSURE: 110 MMHG | HEIGHT: 61 IN | BODY MASS INDEX: 23.98 KG/M2 | DIASTOLIC BLOOD PRESSURE: 60 MMHG | WEIGHT: 127 LBS

## 2022-05-02 DIAGNOSIS — Z12.31 ENCOUNTER FOR SCREENING MAMMOGRAM FOR MALIGNANT NEOPLASM OF BREAST: ICD-10-CM

## 2022-05-02 DIAGNOSIS — Z01.419 ENCOUNTER FOR GYNECOLOGICAL EXAMINATION: Primary | ICD-10-CM

## 2022-05-02 DIAGNOSIS — N89.8 VAGINAL DRYNESS: ICD-10-CM

## 2022-05-02 DIAGNOSIS — N95.1 MENOPAUSAL SYMPTOMS: ICD-10-CM

## 2022-05-02 PROCEDURE — G0476 HPV COMBO ASSAY CA SCREEN: HCPCS | Performed by: STUDENT IN AN ORGANIZED HEALTH CARE EDUCATION/TRAINING PROGRAM

## 2022-05-02 PROCEDURE — G0145 SCR C/V CYTO,THINLAYER,RESCR: HCPCS | Performed by: STUDENT IN AN ORGANIZED HEALTH CARE EDUCATION/TRAINING PROGRAM

## 2022-05-02 PROCEDURE — S0610 ANNUAL GYNECOLOGICAL EXAMINA: HCPCS | Performed by: STUDENT IN AN ORGANIZED HEALTH CARE EDUCATION/TRAINING PROGRAM

## 2022-05-02 RX ORDER — ESTRADIOL 0.1 MG/G
0.5 CREAM VAGINAL 2 TIMES WEEKLY
Qty: 42.5 G | Refills: 1 | Status: SHIPPED | OUTPATIENT
Start: 2022-05-02

## 2022-05-03 NOTE — PROGRESS NOTES
[facility] [address], [place]    ASSESSMENT/PLAN: Maude Zaman is a 62 y o  [id number] who presents for annual gynecologic exam     Encounter for routine gynecologic examination  - Routine well woman exam completed today  - Cervical Cancer Screening: Current ASCCP Guidelines reviewed  Reports normal screenign with Dr Tu Matute office, records not available  Pap done today  - HPV Vaccination status: Not immunized  - STI screening offered including HIV: Declines  - Breast Cancer Screening: Last Mammogram 04/12/2021, ordered for 2022   - Colorectal cancer screening is up to date  - The following were reviewed in today's visit: menopausal symptoms  Additional problems addressed at this visit:  1  Encounter for gynecological examination  -     Liquid-based pap, screening    2  Encounter for screening mammogram for malignant neoplasm of breast  -     Mammo screening bilateral w 3d & cad; Future; Expected date: 05/02/2022    3  Menopausal symptoms  -     Follicle stimulating hormone; Future  -     Estradiol; Future          CC:  Annual Gynecologic Examination    HPI: Maude Zaman is a 62 y o  [id number] who presents for annual gynecologic examination  She presents as a new patient to our [facility] office, formerly a patient of Dr Yue Shankar  She reports no breast concerns  Her last mammogram was 2021  Her last cologuard was 2 years ago  She is likely postmenopausal but had an episode of bleeding in July 2021 and March 2022  She reports hot flashes  She has been taking OTC DHEA supplements for her menopausal symptoms  She is sexually active and reports vaginal dryness  She has no symptoms of pelvic organ prolapse, urinary, or fecal incontinence  She denies intimate partner violence            The following portions of the patient's history were reviewed and updated as appropriate: allergies, current medications, past family history, past medical history, obstetric history, gynecologic history, past social history, past surgical history and problem list     Review of Systems   Constitutional: Negative for chills and fever  Respiratory: Negative for cough and shortness of breath  Cardiovascular: Negative for chest pain and leg swelling  Gastrointestinal: Negative for abdominal pain, nausea and vomiting  Genitourinary: Negative for dysuria, frequency and urgency  Neurological: Negative for dizziness, light-headedness and headaches  Objective:  /60   Ht 5' 1" (1 549 m)   Wt 57 6 kg (127 lb)   LMP 01/01/2021 (Approximate)   BMI 24 00 kg/m²    Physical Exam  Exam conducted with a chaperone present  Constitutional:       Appearance: Normal appearance  HENT:      Head: Normocephalic and atraumatic  Cardiovascular:      Rate and Rhythm: Normal rate  Pulmonary:      Effort: Pulmonary effort is normal    Chest:   Breasts: Breasts are symmetrical       Right: Normal  No swelling, bleeding, inverted nipple, mass, nipple discharge, skin change, tenderness or axillary adenopathy  Left: Normal  No swelling, bleeding, inverted nipple, mass, nipple discharge, skin change, tenderness or axillary adenopathy  Abdominal:      General: There is no distension  Tenderness: There is no abdominal tenderness  There is no guarding  Genitourinary:     Exam position: Lithotomy position  Pubic Area: No rash  Labia:         Right: No rash, tenderness or lesion  Left: No rash, tenderness or lesion  Urethra: No prolapse, urethral swelling or urethral lesion  Vagina: Normal  No vaginal discharge, erythema, tenderness, bleeding or lesions  Cervix: No cervical motion tenderness, discharge, friability or erythema  Uterus: Not enlarged, not tender and no uterine prolapse  Adnexa:         Right: No mass, tenderness or fullness  Left: No mass, tenderness or fullness          Comments: Atrophic vaginal mucosa  Lymphadenopathy:      Upper Body:      Right upper body: No axillary adenopathy  Left upper body: No axillary adenopathy  Lower Body: No right inguinal adenopathy  No left inguinal adenopathy  Neurological:      Mental Status: She is alert               Marybeth Gonzalez MD  [facility]  05/02/22 8:38 PM

## 2022-05-04 LAB
HPV HR 12 DNA CVX QL NAA+PROBE: NEGATIVE
HPV16 DNA CVX QL NAA+PROBE: NEGATIVE
HPV18 DNA CVX QL NAA+PROBE: NEGATIVE

## 2022-05-05 ENCOUNTER — APPOINTMENT (OUTPATIENT)
Dept: LAB | Facility: HOSPITAL | Age: 57
End: 2022-05-05
Attending: STUDENT IN AN ORGANIZED HEALTH CARE EDUCATION/TRAINING PROGRAM
Payer: COMMERCIAL

## 2022-05-05 DIAGNOSIS — N95.1 MENOPAUSAL SYMPTOMS: ICD-10-CM

## 2022-05-05 LAB
ESTRADIOL SERPL-MCNC: <11 PG/ML
FSH SERPL-ACNC: 48.5 MIU/ML

## 2022-05-05 PROCEDURE — 83001 ASSAY OF GONADOTROPIN (FSH): CPT

## 2022-05-05 PROCEDURE — 36415 COLL VENOUS BLD VENIPUNCTURE: CPT

## 2022-05-05 PROCEDURE — 82670 ASSAY OF TOTAL ESTRADIOL: CPT

## 2022-05-06 LAB
LAB AP GYN PRIMARY INTERPRETATION: NORMAL
Lab: NORMAL

## 2022-05-11 ENCOUNTER — HOSPITAL ENCOUNTER (OUTPATIENT)
Dept: MAMMOGRAPHY | Facility: HOSPITAL | Age: 57
Discharge: HOME | End: 2022-05-11
Attending: STUDENT IN AN ORGANIZED HEALTH CARE EDUCATION/TRAINING PROGRAM
Payer: COMMERCIAL

## 2022-05-11 VITALS — HEIGHT: 61 IN | BODY MASS INDEX: 23.98 KG/M2 | WEIGHT: 127 LBS

## 2022-05-11 DIAGNOSIS — Z12.31 ENCOUNTER FOR SCREENING MAMMOGRAM FOR MALIGNANT NEOPLASM OF BREAST: ICD-10-CM

## 2022-05-11 PROCEDURE — 77063 BREAST TOMOSYNTHESIS BI: CPT

## 2022-05-11 PROCEDURE — 77067 SCR MAMMO BI INCL CAD: CPT

## 2022-07-22 ENCOUNTER — APPOINTMENT (OUTPATIENT)
Dept: LAB | Facility: HOSPITAL | Age: 57
End: 2022-07-22
Attending: UROLOGY
Payer: COMMERCIAL

## 2022-07-22 DIAGNOSIS — N30.00 ACUTE CYSTITIS WITHOUT HEMATURIA: Primary | ICD-10-CM

## 2022-07-22 DIAGNOSIS — N30.00 ACUTE CYSTITIS WITHOUT HEMATURIA: ICD-10-CM

## 2022-07-22 LAB
BACTERIA UR QL AUTO: ABNORMAL /HPF
BILIRUB UR QL STRIP: NEGATIVE
CLARITY UR: CLEAR
COLOR UR: YELLOW
GLUCOSE UR STRIP-MCNC: NEGATIVE MG/DL
HGB UR QL STRIP.AUTO: ABNORMAL
KETONES UR STRIP-MCNC: NEGATIVE MG/DL
LEUKOCYTE ESTERASE UR QL STRIP: NEGATIVE
NITRITE UR QL STRIP: POSITIVE
NON-SQ EPI CELLS URNS QL MICRO: ABNORMAL /HPF
PH UR STRIP.AUTO: 7 [PH]
PROT UR STRIP-MCNC: NEGATIVE MG/DL
RBC #/AREA URNS AUTO: ABNORMAL /HPF
SP GR UR STRIP.AUTO: <=1.005 (ref 1–1.03)
UROBILINOGEN UR QL STRIP.AUTO: 1 E.U./DL
WBC #/AREA URNS AUTO: ABNORMAL /HPF

## 2022-07-22 PROCEDURE — 81001 URINALYSIS AUTO W/SCOPE: CPT | Performed by: UROLOGY

## 2022-07-22 PROCEDURE — 87186 SC STD MICRODIL/AGAR DIL: CPT

## 2022-07-22 PROCEDURE — 87086 URINE CULTURE/COLONY COUNT: CPT

## 2022-07-22 PROCEDURE — 87077 CULTURE AEROBIC IDENTIFY: CPT

## 2022-07-25 LAB
BACTERIA UR CULT: ABNORMAL
BACTERIA UR CULT: ABNORMAL

## 2022-08-04 DIAGNOSIS — J01.00 ACUTE NON-RECURRENT MAXILLARY SINUSITIS: Primary | ICD-10-CM

## 2022-08-04 RX ORDER — AZITHROMYCIN 250 MG/1
TABLET, FILM COATED ORAL
Qty: 6 TABLET | Refills: 0 | Status: SHIPPED | OUTPATIENT
Start: 2022-08-04 | End: 2022-08-09

## 2022-08-04 NOTE — TELEPHONE ENCOUNTER
Sure, if she has not contraindication or allergy to [place] you can send me that for approval  She should probably be checked for covid

## 2022-08-04 NOTE — TELEPHONE ENCOUNTER
Spoke with patient  She is agreeable to Zpak  She uses [employer]  Script sent for approval     States she has a home covid test and will take that

## 2022-08-04 NOTE — TELEPHONE ENCOUNTER
Patient called office stating for about one week, she has had allergy like symptoms, but is not improving with Flonase, and Mucinex BID  States she is having head congestion, runny nose, and sore throat  Denies fever and body aches  Asking if she can have something for Sinus infection as she is leaving for vacation on Monday

## 2022-10-03 ENCOUNTER — APPOINTMENT (OUTPATIENT)
Dept: LAB | Facility: HOSPITAL | Age: 57
End: 2022-10-03
Payer: COMMERCIAL

## 2022-10-03 DIAGNOSIS — Z00.00 ROUTINE GENERAL MEDICAL EXAMINATION AT A HEALTH CARE FACILITY: ICD-10-CM

## 2022-10-03 DIAGNOSIS — Z13.220 SCREENING FOR LIPOID DISORDERS: ICD-10-CM

## 2022-10-03 LAB
ALBUMIN SERPL BCP-MCNC: 4.4 G/DL (ref 3.5–5)
ALP SERPL-CCNC: 68 U/L (ref 34–104)
ALT SERPL W P-5'-P-CCNC: 17 U/L (ref 7–52)
ANION GAP SERPL CALCULATED.3IONS-SCNC: 6 MMOL/L (ref 4–13)
AST SERPL W P-5'-P-CCNC: 18 U/L (ref 13–39)
BASOPHILS # BLD AUTO: 0.05 THOUSANDS/ΜL (ref 0–0.1)
BASOPHILS NFR BLD AUTO: 1 % (ref 0–1)
BILIRUB SERPL-MCNC: 0.59 MG/DL (ref 0.2–1)
BUN SERPL-MCNC: 14 MG/DL (ref 5–25)
CALCIUM SERPL-MCNC: 9.5 MG/DL (ref 8.4–10.2)
CHLORIDE SERPL-SCNC: 99 MMOL/L (ref 96–108)
CHOLEST SERPL-MCNC: 169 MG/DL
CO2 SERPL-SCNC: 30 MMOL/L (ref 21–32)
CREAT SERPL-MCNC: 0.84 MG/DL (ref 0.6–1.3)
EOSINOPHIL # BLD AUTO: 0.22 THOUSAND/ΜL (ref 0–0.61)
EOSINOPHIL NFR BLD AUTO: 4 % (ref 0–6)
ERYTHROCYTE [DISTWIDTH] IN BLOOD BY AUTOMATED COUNT: 11.9 % (ref 11.6–15.1)
GFR SERPL CREATININE-BSD FRML MDRD: 77 ML/MIN/1.73SQ M
GLUCOSE P FAST SERPL-MCNC: 83 MG/DL (ref 65–99)
HCT VFR BLD AUTO: 45 % (ref 34.8–46.1)
HDLC SERPL-MCNC: 73 MG/DL
HGB BLD-MCNC: 14.3 G/DL (ref 11.5–15.4)
IMM GRANULOCYTES # BLD AUTO: 0.01 THOUSAND/UL (ref 0–0.2)
IMM GRANULOCYTES NFR BLD AUTO: 0 % (ref 0–2)
LDLC SERPL CALC-MCNC: 85 MG/DL (ref 0–100)
LYMPHOCYTES # BLD AUTO: 1.98 THOUSANDS/ΜL (ref 0.6–4.47)
LYMPHOCYTES NFR BLD AUTO: 35 % (ref 14–44)
MCH RBC QN AUTO: 30 PG (ref 26.8–34.3)
MCHC RBC AUTO-ENTMCNC: 31.8 G/DL (ref 31.4–37.4)
MCV RBC AUTO: 95 FL (ref 82–98)
MONOCYTES # BLD AUTO: 0.55 THOUSAND/ΜL (ref 0.17–1.22)
MONOCYTES NFR BLD AUTO: 10 % (ref 4–12)
NEUTROPHILS # BLD AUTO: 2.87 THOUSANDS/ΜL (ref 1.85–7.62)
NEUTS SEG NFR BLD AUTO: 50 % (ref 43–75)
NONHDLC SERPL-MCNC: 96 MG/DL
NRBC BLD AUTO-RTO: 0 /100 WBCS
PLATELET # BLD AUTO: 265 THOUSANDS/UL (ref 149–390)
PMV BLD AUTO: 9.5 FL (ref 8.9–12.7)
POTASSIUM SERPL-SCNC: 3.7 MMOL/L (ref 3.5–5.3)
PROT SERPL-MCNC: 7 G/DL (ref 6.4–8.4)
RBC # BLD AUTO: 4.76 MILLION/UL (ref 3.81–5.12)
SODIUM SERPL-SCNC: 135 MMOL/L (ref 135–147)
TRIGL SERPL-MCNC: 53 MG/DL
WBC # BLD AUTO: 5.68 THOUSAND/UL (ref 4.31–10.16)

## 2022-10-03 PROCEDURE — 36415 COLL VENOUS BLD VENIPUNCTURE: CPT

## 2022-10-03 PROCEDURE — 80053 COMPREHEN METABOLIC PANEL: CPT

## 2022-10-03 PROCEDURE — 80061 LIPID PANEL: CPT

## 2022-10-03 PROCEDURE — 85025 COMPLETE CBC W/AUTO DIFF WBC: CPT

## 2022-12-05 ENCOUNTER — OFFICE VISIT (OUTPATIENT)
Dept: FAMILY MEDICINE CLINIC | Facility: CLINIC | Age: 57
End: 2022-12-05

## 2022-12-05 VITALS
HEIGHT: 61 IN | WEIGHT: 127 LBS | SYSTOLIC BLOOD PRESSURE: 116 MMHG | BODY MASS INDEX: 23.98 KG/M2 | OXYGEN SATURATION: 99 % | HEART RATE: 98 BPM | DIASTOLIC BLOOD PRESSURE: 68 MMHG | RESPIRATION RATE: 20 BRPM | TEMPERATURE: 98.8 F

## 2022-12-05 DIAGNOSIS — Z13.220 SCREENING CHOLESTEROL LEVEL: ICD-10-CM

## 2022-12-05 DIAGNOSIS — I47.1 SVT (SUPRAVENTRICULAR TACHYCARDIA) (HCC): ICD-10-CM

## 2022-12-05 DIAGNOSIS — Z00.00 ANNUAL PHYSICAL EXAM: Primary | ICD-10-CM

## 2022-12-05 NOTE — PROGRESS NOTES
[address] [facility]        NAME: Jamia Hernandez is a 62 y o  female  : 1965    MRN: [id number]  DATE: 2022  TIME: 2:38 PM    Assessment and Plan   Annual physical exam [N15 12]  2  Annual physical exam  CBC and differential    Comprehensive metabolic panel      2  Screening cholesterol level  Lipid panel      3  SVT (supraventricular tachycardia) [facility])              Patient Instructions     Patient Instructions   Get labs done before visit in 1 year or call sooner for problems/concerns        Depression Screening and Follow-up Plan: Patient was screened for depression during today's encounter  They screened negative with a PHQ-2 score of 0  Chief Complaint     Chief Complaint   Patient presents with   • Physical Exam         History of Present Illness       Here for annual physical and lab review-  No complaints, reviewed labs- all normal, all questions answered      Review of Systems   Review of Systems   Constitutional: Negative for activity change, diaphoresis, fatigue and fever  HENT: Negative for congestion, facial swelling, hearing loss, rhinorrhea, sinus pressure, sinus pain, sneezing, sore throat and voice change  Eyes: Negative for discharge and visual disturbance  Respiratory: Negative for cough, choking, chest tightness, shortness of breath, wheezing and stridor  Cardiovascular: Negative for chest pain, palpitations and leg swelling  Gastrointestinal: Negative for abdominal distention, abdominal pain, constipation, diarrhea, nausea and vomiting  Endocrine: Negative for polydipsia, polyphagia and polyuria  Genitourinary: Negative for difficulty urinating, dysuria, frequency and urgency  Musculoskeletal: Negative for arthralgias, back pain, gait problem, joint swelling, myalgias, neck pain and neck stiffness  Skin: Negative for color change, rash and wound     Neurological: Negative for dizziness, syncope, speech difficulty, weakness, light-headedness and headaches  Hematological: Negative for adenopathy  Does not bruise/bleed easily  Psychiatric/Behavioral: Negative for agitation, behavioral problems, confusion, hallucinations, sleep disturbance and suicidal ideas  The patient is not nervous/anxious  Current Medications       Current Outpatient Medications:   •  b complex vitamins capsule, Take 1 capsule by mouth daily, Disp: , Rfl:   •  cholecalciferol (VITAMIN D3) 1,000 units tablet, Take 2,000 Units by mouth daily , Disp: , Rfl:   •  estradiol (ESTRACE VAGINAL) 0 1 mg/g vaginal cream, Insert 0 5 g into the vagina 2 (two) times a week, Disp: 42 5 g, Rfl: 1  •  omeprazole (PriLOSEC) 20 mg delayed release capsule, Take 1 capsule (20 mg total) by mouth daily before breakfast (Patient not taking: Reported on 2021), Disp: 90 capsule, Rfl: 3    Current Allergies     Allergies as of 2022 - Reviewed 2022   Allergen Reaction Noted   • Penicillins GI Intolerance 2020            The following portions of the patient's history were reviewed and updated as appropriate: allergies, current medications, past family history, past medical history, past social history, past surgical history and problem list      Past Medical History:   Diagnosis Date   • Allergic        Past Surgical History:   Procedure Laterality Date   • CARDIAC ELECTROPHYSIOLOGY STUDY AND ABLATION     •  SECTION     • TUBAL LIGATION         Family History   Problem Relation Age of Onset   • No Known Problems Mother    • No Known Problems Father    • No Known Problems Sister    • BRCA2 Negative Sister    • BRCA1 Negative Sister    • Breast cancer Sister 61   • No Known Problems Sister    • No Known Problems Sister    • No Known Problems Daughter    • No Known Problems Maternal Grandmother    • No Known Problems Paternal Grandmother    • No Known Problems Maternal Aunt    • No Known Problems Paternal Aunt          Medications have been verified          Objective   BP 116/68   Pulse 98   Temp 98 8 °F (37 1 °C) (Tympanic)   Resp 20   Ht 5' 1" (1 549 m)   Wt 57 6 kg (127 lb)   LMP 01/01/2021 (Approximate)   SpO2 99%   BMI 24 00 kg/m²        Physical Exam     Physical Exam  Vitals and nursing note reviewed  Constitutional:       General: She is not in acute distress  Appearance: She is well-developed and well-nourished  She is not diaphoretic  HENT:      Head: Normocephalic and atraumatic  Right Ear: Tympanic membrane, ear canal and external ear normal       Left Ear: Tympanic membrane, ear canal and external ear normal       Nose: Nose normal       Right Sinus: No maxillary sinus tenderness or frontal sinus tenderness  Left Sinus: No maxillary sinus tenderness or frontal sinus tenderness  Mouth/Throat:      Mouth: Oropharynx is clear and moist and mucous membranes are normal  Mucous membranes are moist       Pharynx: Oropharynx is clear  Uvula midline  No oropharyngeal exudate  Eyes:      General:         Right eye: No discharge  Left eye: No discharge  Extraocular Movements: EOM normal       Conjunctiva/sclera: Conjunctivae normal       Pupils: Pupils are equal, round, and reactive to light  Neck:      Thyroid: No thyromegaly  Trachea: No tracheal deviation  Cardiovascular:      Rate and Rhythm: Normal rate and regular rhythm  Heart sounds: Normal heart sounds  No murmur heard  No friction rub  No gallop  Pulmonary:      Effort: Pulmonary effort is normal  No respiratory distress  Breath sounds: Normal breath sounds  No wheezing or rales  Abdominal:      General: Bowel sounds are normal  There is no distension  Palpations: Abdomen is soft  There is no mass  Tenderness: There is no abdominal tenderness  There is no guarding or rebound  Musculoskeletal:         General: No tenderness, deformity or edema  Normal range of motion  Cervical back: Normal range of motion and neck supple        Right lower leg: No edema  Left lower leg: No edema  Lymphadenopathy:      Cervical: No cervical adenopathy  Skin:     General: Skin is warm, dry and intact  Findings: No erythema or rash  Neurological:      Mental Status: She is alert and oriented to person, place, and time  Cranial Nerves: No cranial nerve deficit  Coordination: Coordination normal    Psychiatric:         Mood and Affect: Mood and affect and mood normal          Speech: Speech normal          Behavior: Behavior normal          Thought Content:  Thought content normal          Cognition and Memory: Cognition and memory normal          Judgment: Judgment normal

## 2022-12-16 ENCOUNTER — TELEPHONE (OUTPATIENT)
Dept: FAMILY MEDICINE CLINIC | Facility: CLINIC | Age: 57
End: 2022-12-16

## 2022-12-16 DIAGNOSIS — J30.1 ALLERGIC RHINITIS DUE TO POLLEN, UNSPECIFIED SEASONALITY: ICD-10-CM

## 2022-12-16 RX ORDER — FLUTICASONE PROPIONATE 50 MCG
2 SPRAY, SUSPENSION (ML) NASAL DAILY
Qty: 18 ML | Refills: 2 | Status: SHIPPED | OUTPATIENT
Start: 2022-12-16

## 2022-12-16 NOTE — TELEPHONE ENCOUNTER
Pt needs a refill on her Flonase 50 mcg uses in morning and night   She only uses as needed and is out    It has been a while since ordered      Please advise    Phone [phone]

## 2022-12-19 ENCOUNTER — TELEPHONE (OUTPATIENT)
Dept: FAMILY MEDICINE CLINIC | Facility: CLINIC | Age: 57
End: 2022-12-19

## 2022-12-19 DIAGNOSIS — J06.9 BACTERIAL URI: Primary | ICD-10-CM

## 2022-12-19 DIAGNOSIS — B96.89 BACTERIAL URI: Primary | ICD-10-CM

## 2022-12-19 RX ORDER — AZITHROMYCIN 250 MG/1
TABLET, FILM COATED ORAL
Qty: 6 TABLET | Refills: 0 | Status: SHIPPED | OUTPATIENT
Start: 2022-12-19 | End: 2022-12-24

## 2022-12-19 NOTE — TELEPHONE ENCOUNTER
Pt called and is feels like heavy on her chest and cough , coughing up mucus  Yellow,   musinex does not work for her  And it keeps her up at night, not sleeping     No fever  Using her Flonase    Onset 7 days ago    [address]    Phone: [phone]    Asking for medication    Please advise

## 2023-02-07 ENCOUNTER — TELEPHONE (OUTPATIENT)
Dept: FAMILY MEDICINE CLINIC | Facility: CLINIC | Age: 58
End: 2023-02-07

## 2023-02-07 DIAGNOSIS — K21.00 GASTROESOPHAGEAL REFLUX DISEASE WITH ESOPHAGITIS WITHOUT HEMORRHAGE: Primary | ICD-10-CM

## 2023-02-07 NOTE — TELEPHONE ENCOUNTER
Sh would like a referral to see GI she said that she talked to [facility] about this already    She gets burning in her stomach right before she gets a hot flash she said it happens about 20 times a day     She said that she will call to schedule the appt with GI

## 2023-04-17 PROBLEM — R10.13 EPIGASTRIC BURNING SENSATION: Status: ACTIVE | Noted: 2023-04-17

## 2023-04-17 PROBLEM — Z12.11 COLON CANCER SCREENING: Status: ACTIVE | Noted: 2023-04-17

## 2023-04-17 PROBLEM — Z86.19 HISTORY OF HELICOBACTER PYLORI INFECTION: Status: ACTIVE | Noted: 2023-04-17

## 2023-04-17 PROBLEM — K21.00 GASTROESOPHAGEAL REFLUX DISEASE WITH ESOPHAGITIS WITHOUT HEMORRHAGE: Status: ACTIVE | Noted: 2023-04-17

## 2023-04-17 NOTE — H&P (VIEW-ONLY)
[facility]  Gastroenterology Outpatient Consultation  Patient Alyssa Baker   Age 62 y o  Gender female   MRN: [id number]  [facility] [id number]     ASSESSMENT AND PLAN:   Problem List Items Addressed This Visit        Digestive    Gastroesophageal reflux disease with esophagitis without hemorrhage     Please see comments under epigastric burning            Other    Epigastric burning sensation - Primary     Patient does report an epigastric burning sensation that she has had for the last couple years  It is occurring on a daily basis at this point time but is very short-lived  Its not associated with eating  She does not use NSAIDs  This may wake her from sleep  She does have a history of H  pylori, she could certainly have recurrent H  pylori  Peptic ulcer disease or acid peptic disorder is also in the differential diagnosis  Doubt biliary etiology  She did try omeprazole 20 mg daily without relief  I did offer a trial of famotidine 40 mg nightly however she would prefer not taking anything  She would prefer endoscopy first   Check CBC, chemistry complete  Patient will be scheduled for upper endoscopy  I explained to the patient the procedure of upper endoscopy as well as its potential risk which are approximately 1 in 1000 chance of bleeding, infection, and perforation  Relevant Orders    EGD    Comprehensive metabolic panel    CBC    TSH, 3rd generation with Free T4 reflex    Colon cancer screening     Patient had a Cologuard done about 3 years ago  At this point she would prefer to pursue colonoscopy at time of her endoscopy  She will receive a Colyte preparation and split dose with the second dose completed 3 hours prior to arrival   2 bisacodyl tablets  Pediatric scope  I explained to the patient the procedure of colonoscopy as well as its potential risks which are approximately 3 in 1000 chance of bleeding, infection, and perforation    Perforation may require a surgeon to repair it  I also explained the small but significant chance of missing lesions with colonoscopy which has been reported to be about 5-10%  Relevant Medications    polyethylene glycol-electrolytes (TriLyte) 4000 mL solution    Other Relevant Orders    Colonoscopy    History of Helicobacter pylori infection     Patient reports history of H  Pylori  She is not sure when she had an endoscopy but it was diagnosed at time of upper endoscopy  She does report being treated with antibiotics  Biopsies for H  pylori will be obtained at time of EGD           _____________________________________________________________    HPI:   Beto Ndiaye is a delightful 42-year-old woman who I am seeing in the office in consultation at the request of Patria Cabrera for evaluation of epigastric burning  Patient reports she is probably had the symptoms for couple years now  She states that she will get burning in the epigastric region and this will be followed by a hot flash  This was occurring a few times a day  It was also occurring at night and would wake her from sleep  She would wake up with burning in the epigastric region and then would develop a hot flash  When this occurs she would feel very weak for period of time  The burning sensation would last for maybe a minute or 2 and go away  This was not associated with any nausea or vomiting  This burning sensation was not made better by eating or worse by eating  Basically no change with food intake  She did try omeprazole 20 mg daily for 1 month and she did not feel that this helped her  She did report that it appears to be occurring less frequently now  It is no longer occurring every night but she does have at least once a day at this point but it is less frequent  She denies any NSAID use  She does drink about two 8 ounce cups of coffee a day  There is been no weight loss associated with this  Denies any early satiety or nausea or vomiting    She states that she used to have a lot of acid reflux in fact would also have regurgitation but she is no longer experiencing the symptom  Patient reports that her bowel habits do tend towards constipation  She did try taking Benefiber and this seemed to make her symptoms worse  She finds that prune juice or natural fiber works the best   If she takes prune juice she will have a daily bowel movement  There is been no rectal bleeding  She has had this bowel pattern for many years thus this is not a change for her  She denies any rectal bleeding or black stools  She did have a Cologuard in August 2020 that was negative  No family history of colon cancer or colon polyps  She does not smoke tobacco  She does have 3 to 4 ounces of homemade wine nightly    6/30/2020 ultrasound right upper quadrant-negative  10/3/2022 CBC and chemistry complete were okay    Patient does report having had an upper endoscopy many years ago  She does report a history of H  pylori and was treated with antibiotics  This was diagnosed at time of upper endoscopy    Allergies   Allergen Reactions   • Penicillins GI Intolerance     Current Outpatient Medications   Medication Sig Dispense Refill   • b complex vitamins capsule Take 1 capsule by mouth daily     • cholecalciferol (VITAMIN D3) 1,000 units tablet Take 2,000 Units by mouth daily      • estradiol (ESTRACE VAGINAL) 0 1 mg/g vaginal cream Insert 0 5 g into the vagina 2 (two) times a week 42 5 g 1   • fluticasone (FLONASE) 50 mcg/act nasal spray 2 sprays into each nostril daily 18 mL 2   • polyethylene glycol-electrolytes (TriLyte) 4000 mL solution Take 4,000 mL by mouth once for 1 dose Take 4000 mL by mouth once for 1 dose  Use as directed 4000 mL 0     No current facility-administered medications for this visit       MEDICAL HISTORY:  Past Medical History:   Diagnosis Date   • Allergic    • SVT (supraventricular tachycardia) ([facility] [address] )      Past Surgical History:   Procedure Laterality Date "  • CARDIAC ELECTROPHYSIOLOGY STUDY AND ABLATION     •  SECTION     • TUBAL LIGATION       Social History     Substance and Sexual Activity   Alcohol Use Yes   • Alcohol/week: 1 0 standard drink   • Types: 1 Glasses of wine per week    Comment: occasional     Social History     Substance and Sexual Activity   Drug Use No     Social History     Tobacco Use   Smoking Status Never   Smokeless Tobacco Never     Family History   Problem Relation Age of Onset   • No Known Problems Mother    • No Known Problems Father    • No Known Problems Sister    • BRCA2 Negative Sister    • BRCA1 Negative Sister    • Breast cancer Sister 61   • No Known Problems Sister    • No Known Problems Sister    • No Known Problems Daughter    • No Known Problems Maternal Grandmother    • No Known Problems Paternal Grandmother    • No Known Problems Maternal Aunt    • No Known Problems Paternal Aunt        REVIEW OF SYSTEMS:  CONSTITUTIONAL: Denies any fever, chills, rigors, and weight loss  HEENT: No earache or tinnitus  Denies hearing loss or visual disturbances  CARDIOVASCULAR: No chest pain or palpitations  RESPIRATORY: Denies any cough, hemoptysis, shortness of breath or dyspnea on exertion  GASTROINTESTINAL: As noted in the History of Present Illness  GENITOURINARY: No problems with urination  Denies any hematuria or dysuria  NEUROLOGIC: No dizziness or vertigo, denies headaches  MUSCULOSKELETAL: Denies any muscle or joint pain  SKIN: Denies skin rashes or itching  ENDOCRINE: Denies excessive thirst  Denies intolerance to heat or cold  PSYCHOSOCIAL: Denies depression or anxiety  Denies any recent memory loss  Objective   Blood pressure 102/70, pulse 101, temperature 98 3 °F (36 8 °C), resp  rate 17, height 5' 1\" (1 549 m), weight 57 6 kg (127 lb), last menstrual period 2021, SpO2 97 %  Body mass index is 24 kg/m²      PHYSICAL EXAM:   General Appearance: Alert, cooperative, no distress  HEENT: Normocephalic, " atraumatic, anicteric  Neck: Supple, symmetrical, trachea midline  Lungs: Clear to auscultation bilaterally; no rales, rhonchi or wheezing; respirations unlabored   Heart: Regular rate and rhythm; no murmur, rub, or gallop  Abdomen: Soft, bowel sounds normal, non-tender, non-distended; no masses, there is no hepatosplenomegaly  No spider angiomas  Genitalia: Deferred   Rectal: Deferred   Extremities: No cyanosis, clubbing or edema   Skin: No jaundice, rashes, or lesions   Lymph nodes: No palpable cervical lymphadenopathy   Lab Results:   No visits with results within 2 Month(s) from this visit     Latest known visit with results is:   Appointment on 10/03/2022   Component Date Value   • Cholesterol 10/03/2022 169    • Triglycerides 10/03/2022 53    • HDL, Direct 10/03/2022 73    • LDL Calculated 10/03/2022 85    • Non-HDL-Chol (CHOL-HDL) 10/03/2022 96    • WBC 10/03/2022 5 68    • RBC 10/03/2022 4 76    • Hemoglobin 10/03/2022 14 3    • Hematocrit 10/03/2022 45 0    • MCV 10/03/2022 95    • MCH 10/03/2022 30 0    • MCHC 10/03/2022 31 8    • RDW 10/03/2022 11 9    • MPV 10/03/2022 9 5    • Platelets 91/38/2375 265    • nRBC 10/03/2022 0    • Neutrophils Relative 10/03/2022 50    • Immat GRANS % 10/03/2022 0    • Lymphocytes Relative 10/03/2022 35    • Monocytes Relative 10/03/2022 10    • Eosinophils Relative 10/03/2022 4    • Basophils Relative 10/03/2022 1    • Neutrophils Absolute 10/03/2022 2 87    • Immature Grans Absolute 10/03/2022 0 01    • Lymphocytes Absolute 10/03/2022 1 98    • Monocytes Absolute 10/03/2022 0 55    • Eosinophils Absolute 10/03/2022 0 22    • Basophils Absolute 10/03/2022 0 05    • Sodium 10/03/2022 135    • Potassium 10/03/2022 3 7    • Chloride 10/03/2022 99    • CO2 10/03/2022 30    • ANION GAP 10/03/2022 6    • BUN 10/03/2022 14    • Creatinine 10/03/2022 0 84    • Glucose, Fasting 10/03/2022 83    • Calcium 10/03/2022 9 5    • AST 10/03/2022 18    • ALT 10/03/2022 17    • Alkaline Phosphatase 10/03/2022 68    • Total Protein 10/03/2022 7 0    • Albumin 10/03/2022 4 4    • Total Bilirubin 10/03/2022 0 59    • eGFR 10/03/2022 77      Radiology Results:   No results found    Myra Rivera DO   04/17/23   Cc:

## 2023-04-19 ENCOUNTER — LAB (OUTPATIENT)
Dept: LAB | Facility: HOSPITAL | Age: 58
End: 2023-04-19

## 2023-04-19 DIAGNOSIS — R10.13 EPIGASTRIC BURNING SENSATION: ICD-10-CM

## 2023-04-19 LAB
ALBUMIN SERPL BCP-MCNC: 4.6 G/DL (ref 3.5–5)
ALP SERPL-CCNC: 72 U/L (ref 34–104)
ALT SERPL W P-5'-P-CCNC: 14 U/L (ref 7–52)
ANION GAP SERPL CALCULATED.3IONS-SCNC: 7 MMOL/L (ref 4–13)
AST SERPL W P-5'-P-CCNC: 17 U/L (ref 13–39)
BILIRUB SERPL-MCNC: 0.66 MG/DL (ref 0.2–1)
BUN SERPL-MCNC: 18 MG/DL (ref 5–25)
CALCIUM SERPL-MCNC: 9.5 MG/DL (ref 8.4–10.2)
CHLORIDE SERPL-SCNC: 100 MMOL/L (ref 96–108)
CO2 SERPL-SCNC: 28 MMOL/L (ref 21–32)
CREAT SERPL-MCNC: 0.8 MG/DL (ref 0.6–1.3)
ERYTHROCYTE [DISTWIDTH] IN BLOOD BY AUTOMATED COUNT: 12.3 % (ref 11.6–15.1)
GFR SERPL CREATININE-BSD FRML MDRD: 81 ML/MIN/1.73SQ M
GLUCOSE P FAST SERPL-MCNC: 84 MG/DL (ref 65–99)
HCT VFR BLD AUTO: 45.4 % (ref 34.8–46.1)
HGB BLD-MCNC: 14.8 G/DL (ref 11.5–15.4)
MCH RBC QN AUTO: 30.6 PG (ref 26.8–34.3)
MCHC RBC AUTO-ENTMCNC: 32.6 G/DL (ref 31.4–37.4)
MCV RBC AUTO: 94 FL (ref 82–98)
PLATELET # BLD AUTO: 264 THOUSANDS/UL (ref 149–390)
PMV BLD AUTO: 9.2 FL (ref 8.9–12.7)
POTASSIUM SERPL-SCNC: 3.9 MMOL/L (ref 3.5–5.3)
PROT SERPL-MCNC: 7.1 G/DL (ref 6.4–8.4)
RBC # BLD AUTO: 4.84 MILLION/UL (ref 3.81–5.12)
SODIUM SERPL-SCNC: 135 MMOL/L (ref 135–147)
TSH SERPL DL<=0.05 MIU/L-ACNC: 2.5 UIU/ML (ref 0.45–4.5)
WBC # BLD AUTO: 6.51 THOUSAND/UL (ref 4.31–10.16)

## 2023-04-20 NOTE — RESULT ENCOUNTER NOTE
Please inform patient that chemistry panel was normal   Thyroid function normal   CBC normal   Thank you

## 2023-05-02 DIAGNOSIS — Z12.31 ENCOUNTER FOR SCREENING MAMMOGRAM FOR MALIGNANT NEOPLASM OF BREAST: Primary | ICD-10-CM

## 2023-05-15 RX ORDER — SODIUM CHLORIDE, SODIUM LACTATE, POTASSIUM CHLORIDE, CALCIUM CHLORIDE 600; 310; 30; 20 MG/100ML; MG/100ML; MG/100ML; MG/100ML
125 INJECTION, SOLUTION INTRAVENOUS CONTINUOUS
Status: CANCELLED | OUTPATIENT
Start: 2023-05-15

## 2023-05-16 ENCOUNTER — HOSPITAL ENCOUNTER (OUTPATIENT)
Dept: GASTROENTEROLOGY | Facility: HOSPITAL | Age: 58
Setting detail: OUTPATIENT SURGERY
Discharge: HOME | End: 2023-05-16
Attending: INTERNAL MEDICINE

## 2023-05-16 ENCOUNTER — ANESTHESIA (OUTPATIENT)
Dept: GASTROENTEROLOGY | Facility: HOSPITAL | Age: 58
End: 2023-05-16

## 2023-05-16 ENCOUNTER — ANESTHESIA EVENT (OUTPATIENT)
Dept: GASTROENTEROLOGY | Facility: HOSPITAL | Age: 58
End: 2023-05-16

## 2023-05-16 VITALS
HEART RATE: 61 BPM | BODY MASS INDEX: 23.98 KG/M2 | TEMPERATURE: 97.5 F | RESPIRATION RATE: 16 BRPM | WEIGHT: 127 LBS | OXYGEN SATURATION: 100 % | HEIGHT: 61 IN | SYSTOLIC BLOOD PRESSURE: 110 MMHG | DIASTOLIC BLOOD PRESSURE: 69 MMHG

## 2023-05-16 DIAGNOSIS — Z12.11 COLON CANCER SCREENING: ICD-10-CM

## 2023-05-16 DIAGNOSIS — R10.13 EPIGASTRIC BURNING SENSATION: ICD-10-CM

## 2023-05-16 DIAGNOSIS — K21.00 GASTROESOPHAGEAL REFLUX DISEASE WITH ESOPHAGITIS WITHOUT HEMORRHAGE: Primary | ICD-10-CM

## 2023-05-16 RX ORDER — SODIUM CHLORIDE, SODIUM LACTATE, POTASSIUM CHLORIDE, CALCIUM CHLORIDE 600; 310; 30; 20 MG/100ML; MG/100ML; MG/100ML; MG/100ML
125 INJECTION, SOLUTION INTRAVENOUS CONTINUOUS
Status: DISCONTINUED | OUTPATIENT
Start: 2023-05-16 | End: 2023-05-20 | Stop reason: HOSPADM

## 2023-05-16 RX ORDER — LIDOCAINE HYDROCHLORIDE 20 MG/ML
15 SOLUTION OROPHARYNGEAL ONCE
Status: DISCONTINUED | OUTPATIENT
Start: 2023-05-16 | End: 2023-05-16

## 2023-05-16 RX ORDER — FAMOTIDINE 40 MG/1
40 TABLET, FILM COATED ORAL
Qty: 90 TABLET | Refills: 3 | Status: SHIPPED | OUTPATIENT
Start: 2023-05-16

## 2023-05-16 RX ORDER — PROPOFOL 10 MG/ML
INJECTION, EMULSION INTRAVENOUS AS NEEDED
Status: DISCONTINUED | OUTPATIENT
Start: 2023-05-16 | End: 2023-05-16

## 2023-05-16 RX ORDER — LIDOCAINE HYDROCHLORIDE 20 MG/ML
INJECTION, SOLUTION EPIDURAL; INFILTRATION; INTRACAUDAL; PERINEURAL AS NEEDED
Status: DISCONTINUED | OUTPATIENT
Start: 2023-05-16 | End: 2023-05-16

## 2023-05-16 RX ADMIN — PROPOFOL 40 MG: 10 INJECTION, EMULSION INTRAVENOUS at 09:35

## 2023-05-16 RX ADMIN — PROPOFOL 50 MG: 10 INJECTION, EMULSION INTRAVENOUS at 09:59

## 2023-05-16 RX ADMIN — PROPOFOL 40 MG: 10 INJECTION, EMULSION INTRAVENOUS at 09:45

## 2023-05-16 RX ADMIN — PROPOFOL 30 MG: 10 INJECTION, EMULSION INTRAVENOUS at 09:21

## 2023-05-16 RX ADMIN — TOPICAL ANESTHETIC 1 SPRAY: 200 SPRAY DENTAL; PERIODONTAL at 09:18

## 2023-05-16 RX ADMIN — LIDOCAINE HYDROCHLORIDE 100 MG: 20 INJECTION, SOLUTION EPIDURAL; INFILTRATION; INTRACAUDAL; PERINEURAL at 09:18

## 2023-05-16 RX ADMIN — PROPOFOL 30 MG: 10 INJECTION, EMULSION INTRAVENOUS at 09:26

## 2023-05-16 RX ADMIN — PROPOFOL 40 MG: 10 INJECTION, EMULSION INTRAVENOUS at 09:51

## 2023-05-16 RX ADMIN — PROPOFOL 110 MG: 10 INJECTION, EMULSION INTRAVENOUS at 09:18

## 2023-05-16 RX ADMIN — SODIUM CHLORIDE, SODIUM LACTATE, POTASSIUM CHLORIDE, AND CALCIUM CHLORIDE: .6; .31; .03; .02 INJECTION, SOLUTION INTRAVENOUS at 08:45

## 2023-05-16 RX ADMIN — PROPOFOL 40 MG: 10 INJECTION, EMULSION INTRAVENOUS at 09:40

## 2023-05-16 RX ADMIN — SODIUM CHLORIDE, SODIUM LACTATE, POTASSIUM CHLORIDE, AND CALCIUM CHLORIDE 125 ML/HR: .6; .31; .03; .02 INJECTION, SOLUTION INTRAVENOUS at 08:02

## 2023-05-16 RX ADMIN — PROPOFOL 20 MG: 10 INJECTION, EMULSION INTRAVENOUS at 09:31

## 2023-05-16 NOTE — ANESTHESIA POSTPROCEDURE EVALUATION
Post-Op Assessment Note    CV Status:  Stable    Pain management: adequate     Mental Status:  Alert and awake   Hydration Status:  Euvolemic   PONV Controlled:  Controlled   Airway Patency:  Patent      Post Op Vitals Reviewed: Yes      Staff: CRNA         No notable events documented      BP   111/68   Temp     Pulse  71   Resp   13   SpO2   100

## 2023-05-16 NOTE — INTERVAL H&P NOTE
H&P reviewed  After examining the patient I find no changes in the patients condition since the H&P had been written  Patient reports continued intermittent epigastric burning  She states it feels like a hot flash will come and go  Not related to eating  She also reports intermittent constipation  She felt that fiber supplementation made her worse  She is stable for her procedures today      Vitals:    05/16/23 0754   BP: 125/90   Pulse: 91   Resp: 18   Temp: (!) 97 2 °F (36 2 °C)   SpO2: 100%

## 2023-05-16 NOTE — ANESTHESIA PREPROCEDURE EVALUATION
Procedure:  EGD  COLONOSCOPY    Relevant Problems   CARDIO   (+) SVT (supraventricular tachycardia) (HCC)      GI/HEPATIC   (+) Gastroesophageal reflux disease with esophagitis without hemorrhage      NEURO/PSYCH   (+) History of Helicobacter pylori infection        Physical Exam    Airway    Mallampati score: I  TM Distance: >3 FB  Neck ROM: full     Dental   No notable dental hx     Cardiovascular      Pulmonary      Other Findings        Anesthesia Plan  ASA Score- 2     Anesthesia Type- IV sedation with anesthesia with ASA Monitors  Additional Monitors:   Airway Plan:           Plan Factors-Exercise tolerance (METS): >4 METS  Chart reviewed  Patient summary reviewed  Patient is not a current smoker  There is medical exclusion for perioperative obstructive sleep apnea risk education  Induction- intravenous  Postoperative Plan-     Informed Consent- Anesthetic plan and risks discussed with patient  I personally reviewed this patient with the CRNA  Discussed and agreed on the Anesthesia Plan with the CRNA  Jodee Morales

## 2023-05-19 DIAGNOSIS — A04.8 H. PYLORI INFECTION: Primary | ICD-10-CM

## 2023-05-19 RX ORDER — METRONIDAZOLE 250 MG/1
250 TABLET ORAL 4 TIMES DAILY
Qty: 56 TABLET | Refills: 0 | Status: SHIPPED | OUTPATIENT
Start: 2023-05-19 | End: 2023-06-02

## 2023-05-19 RX ORDER — TETRACYCLINE HYDROCHLORIDE 500 MG/1
500 CAPSULE ORAL 4 TIMES DAILY
Qty: 56 CAPSULE | Refills: 0 | Status: SHIPPED | OUTPATIENT
Start: 2023-05-19 | End: 2023-06-02

## 2023-05-19 RX ORDER — OMEPRAZOLE 20 MG/1
20 CAPSULE, DELAYED RELEASE ORAL
Qty: 60 CAPSULE | Refills: 4 | Status: SHIPPED | OUTPATIENT
Start: 2023-05-19

## 2023-05-19 RX ORDER — BISMUTH SUBSALICYLATE 262 MG/1
524 TABLET, CHEWABLE ORAL 4 TIMES DAILY
Qty: 30 TABLET | Refills: 0 | Status: SHIPPED | OUTPATIENT
Start: 2023-05-19

## 2023-05-20 NOTE — RESULT ENCOUNTER NOTE
I left a voicemail that biopsies of the duodenum were negative for celiac disease  Biopsy esophagus were negative for De Leon's esophagus or eosinophilic esophagitis  Biopsies stomach did reveal H  pylori  Colon polyps are benign precancerous polyps, one was a serrated adenoma and the other was a tubular adenoma  Based upon this she should undergo repeat colonoscopy in 5 years please recall for repeat colonoscopy in 5 years  For treatment of her H  pylori please double check and make sure she does not have any other allergies I noticed the penicillin allergy thus I would recommend treatment with tetracycline 500 mg every 6 hours for 14 days  If the tetracycline is not covered, we can always try the [employer]  I am off next week please feel free to send this to [employer] if needed )  Metronidazole 250 mg every 6 hours for 14 days (no alcohol while on Flagyl)  Omeprazole 20 mg twice a day for 14 days then 1 daily for 14 days  Pepto-Bismol tablets 2 tablets every 6 hours for 14 days  She will need a stool H  pylori antigen done 4 to 6 weeks after treatment  Please arrange for office follow-up in 4 to 6 months    Thank you

## 2023-05-22 ENCOUNTER — TELEPHONE (OUTPATIENT)
Dept: GASTROENTEROLOGY | Facility: MEDICAL CENTER | Age: 58
End: 2023-05-22

## 2023-05-22 NOTE — TELEPHONE ENCOUNTER
Spoke with pt  Reviewed H pylori protocol & prescriptions sent by Dr Melanie Vee  Reviewed all test results  [facility]  Pt verbalized understanding and is agreeable to treatment  Pt will call back if prior auth is needed for any of the medications

## 2023-05-22 NOTE — TELEPHONE ENCOUNTER
----- Message from Richard CamargoDO sent at 5/19/2023  8:45 PM EDT -----  I left a voicemail that biopsies of the duodenum were negative for celiac disease  Biopsy esophagus were negative for De Leon's esophagus or eosinophilic esophagitis  Biopsies stomach did reveal H  pylori  Colon polyps are benign precancerous polyps, one was a serrated adenoma and the other was a tubular adenoma  Based upon this she should undergo repeat colonoscopy in 5 years please recall for repeat colonoscopy in 5 years  For treatment of her H  pylori please double check and make sure she does not have any other allergies I noticed the penicillin allergy thus I would recommend treatment with tetracycline 500 mg every 6 hours for 14 days  If the tetracycline is not covered, we can always try the [employer]  I am off next week please feel free to send this to [employer] if needed )  Metronidazole 250 mg every 6 hours for 14 days (no alcohol while on Flagyl)  Omeprazole 20 mg twice a day for 14 days then 1 daily for 14 days  Pepto-Bismol tablets 2 tablets every 6 hours for 14 days  She will need a stool H  pylori antigen done 4 to 6 weeks after treatment  Please arrange for office follow-up in 4 to 6 months    Thank you

## 2023-05-24 ENCOUNTER — TELEPHONE (OUTPATIENT)
Dept: OTHER | Facility: OTHER | Age: 58
End: 2023-05-24

## 2023-05-24 NOTE — TELEPHONE ENCOUNTER
The patient called to speak with Alexx Alford about the antibiotics she was prescribed  She has questions before starting  The patient reports that she was given directions to take the two medications together but she was reading the instructions and one informed her that she had to take it with food and the other on an empty stomach      Please contact the patient

## 2023-05-24 NOTE — TELEPHONE ENCOUNTER
Spoke with pt, all questions and concerns addressed regarding h pylori treatment    Pt verbalized understanding of all information

## 2023-06-06 ENCOUNTER — ANNUAL EXAM (OUTPATIENT)
Dept: OBGYN CLINIC | Facility: CLINIC | Age: 58
End: 2023-06-06
Payer: COMMERCIAL

## 2023-06-06 VITALS
HEIGHT: 61 IN | SYSTOLIC BLOOD PRESSURE: 110 MMHG | WEIGHT: 127 LBS | DIASTOLIC BLOOD PRESSURE: 70 MMHG | BODY MASS INDEX: 23.98 KG/M2

## 2023-06-06 DIAGNOSIS — N89.8 VAGINAL DRYNESS: ICD-10-CM

## 2023-06-06 DIAGNOSIS — Z12.31 SCREENING MAMMOGRAM FOR BREAST CANCER: ICD-10-CM

## 2023-06-06 DIAGNOSIS — Z01.419 ENCOUNTER FOR GYNECOLOGICAL EXAMINATION: Primary | ICD-10-CM

## 2023-06-06 PROCEDURE — S0612 ANNUAL GYNECOLOGICAL EXAMINA: HCPCS | Performed by: STUDENT IN AN ORGANIZED HEALTH CARE EDUCATION/TRAINING PROGRAM

## 2023-06-06 RX ORDER — ESTRADIOL 0.1 MG/G
0.5 CREAM VAGINAL 2 TIMES WEEKLY
Qty: 42.5 G | Refills: 1 | Status: SHIPPED | OUTPATIENT
Start: 2023-06-08

## 2023-06-06 NOTE — PROGRESS NOTES
[facility] [address] [place] [address]    ASSESSMENT/PLAN: Arielle Mann is a 62 y o  [id number] who presents for annual gynecologic exam     Encounter for routine gynecologic examination  - Routine well woman exam completed today  - Cervical Cancer Screening: Current ASCCP Guidelines reviewed  Last Pap: 05/02/2022 2025  - HPV Vaccination status: Not immunized  - STI screening offered including HIV: Declines  - Breast Cancer Screening: Last Mammogram 05/11/2022, ordered  - Colorectal cancer screening was ordered  - The following were reviewed in today's visit: menopause    Additional problems addressed at this visit:  1  Encounter for gynecological examination    2  Vaginal dryness  -     estradiol (ESTRACE VAGINAL) 0 1 mg/g vaginal cream; Insert 0 5 g into the vagina 2 (two) times a week    3  Screening mammogram for breast cancer  -     Mammo screening bilateral w 3d & cad; Future; Expected date: 06/06/2023          CC: Annual Gynecologic Examination    HPI: Arielle Mann is a 62 y o  Caesar Stair who presents for annual gynecologic examination  She reports no new changes to her health  She reports no breast concerns  She gest menopausal hot flashes very occasionally  She has no vaginal discharge, vulvar or vaginal lesions, pelvic pain, or abnormal bleeding  She has no sexual health concerns and is currently sexually active with one male partner  She uses estrace for vaginal dryness  She is due for mammogram but up to date on colonoscopy  She has no symptoms of pelvic organ prolapse, urinary, or fecal incontinence  She denies intimate partner violence          The following portions of the patient's history were reviewed and updated as appropriate: allergies, current medications, past family history, past medical history, obstetric history, gynecologic history, past social history, past surgical history and problem list     Review of Systems   Constitutional: Negative for chills "and fever  Respiratory: Negative for cough and shortness of breath  Cardiovascular: Negative for chest pain and leg swelling  Gastrointestinal: Negative for abdominal pain, nausea and vomiting  Genitourinary: Negative for dysuria, frequency and urgency  Neurological: Negative for dizziness, light-headedness and headaches  Objective:  /70   Ht 5' 1\" (1 549 m)   Wt 57 6 kg (127 lb)   LMP 01/01/2021 (Approximate)   BMI 24 00 kg/m²    Physical Exam  Exam conducted with a chaperone present  Constitutional:       Appearance: Normal appearance  HENT:      Head: Normocephalic and atraumatic  Cardiovascular:      Rate and Rhythm: Normal rate  Pulmonary:      Effort: Pulmonary effort is normal    Chest:   Breasts:     Breasts are symmetrical       Right: Normal  No swelling, bleeding, inverted nipple, mass, nipple discharge, skin change or tenderness  Left: Normal  No swelling, bleeding, inverted nipple, mass, nipple discharge, skin change or tenderness  Abdominal:      General: There is no distension  Tenderness: There is no abdominal tenderness  There is no guarding  Genitourinary:     Exam position: Lithotomy position  Pubic Area: No rash  Labia:         Right: No rash, tenderness or lesion  Left: No rash, tenderness or lesion  Urethra: No prolapse, urethral swelling or urethral lesion  Vagina: Normal  No vaginal discharge, erythema, tenderness, bleeding or lesions  Cervix: No cervical motion tenderness, discharge, friability or erythema  Uterus: Not enlarged, not tender and no uterine prolapse  Adnexa:         Right: No mass, tenderness or fullness  Left: No mass, tenderness or fullness  Lymphadenopathy:      Upper Body:      Right upper body: No axillary adenopathy  Left upper body: No axillary adenopathy  Lower Body: No right inguinal adenopathy  No left inguinal adenopathy     Neurological:      Mental " Status: She is alert               Ellyn Worthington MD  [facility]  06/06/23 9:35 AM

## 2023-06-16 PROBLEM — Z12.11 COLON CANCER SCREENING: Status: RESOLVED | Noted: 2023-04-17 | Resolved: 2023-06-16

## 2023-06-19 ENCOUNTER — OFFICE VISIT (OUTPATIENT)
Dept: URGENT CARE | Facility: CLINIC | Age: 58
End: 2023-06-19
Payer: COMMERCIAL

## 2023-06-19 VITALS
HEIGHT: 61 IN | DIASTOLIC BLOOD PRESSURE: 84 MMHG | RESPIRATION RATE: 18 BRPM | BODY MASS INDEX: 23.98 KG/M2 | WEIGHT: 127 LBS | HEART RATE: 103 BPM | SYSTOLIC BLOOD PRESSURE: 126 MMHG | TEMPERATURE: 99 F | OXYGEN SATURATION: 99 %

## 2023-06-19 DIAGNOSIS — B34.9 ACUTE VIRAL SYNDROME: Primary | ICD-10-CM

## 2023-06-19 DIAGNOSIS — R05.1 ACUTE COUGH: ICD-10-CM

## 2023-06-19 PROCEDURE — 99213 OFFICE O/P EST LOW 20 MIN: CPT

## 2023-06-19 RX ORDER — PREDNISONE 20 MG/1
40 TABLET ORAL DAILY
Qty: 10 TABLET | Refills: 0 | Status: SHIPPED | OUTPATIENT
Start: 2023-06-19 | End: 2023-06-24

## 2023-06-19 RX ORDER — BENZONATATE 100 MG/1
100 CAPSULE ORAL 3 TIMES DAILY PRN
Qty: 20 CAPSULE | Refills: 0 | Status: SHIPPED | OUTPATIENT
Start: 2023-06-19

## 2023-06-19 NOTE — PROGRESS NOTES
[facility]        NAME: Ted Prabhakar is a 62 y o  female  : 1965    MRN: [id number]  DATE: 2023  TIME: 4:49 PM    Assessment and Plan   Acute viral syndrome [B34 9]  1  Acute viral syndrome  predniSONE 20 mg tablet    benzonatate (TESSALON PERLES) 100 mg capsule      2  Acute cough  predniSONE 20 mg tablet    benzonatate (TESSALON PERLES) 100 mg capsule            Patient Instructions     Start prednisone as prescribed  Start tessalon perles as prescribed  Vitamin D3 2000 IU daily  Vitamin C 1000mg twice per day  Multivitamin daily  Fluids and rest  Over the counter cold medication as needed (EX: Mucinex, tylenol/motrin)  Follow up with PCP in 3-5 days  Proceed to  ER if symptoms worsen  Chief Complaint     Chief Complaint   Patient presents with   • Cold Like Symptoms     Patient c/o cough, chest congestion, body aches, and and left ear pain that started a week ago  History of Present Illness       Patient is a 61 yo female with no significant PMH presenting in the clinic today for cold sx x 1 week  Admits chills, cough, congestion, body aches, left ear pain, and cervical lymphadenopathy  Denies fever, sore throat, headache, sinus pain/pressure, chest pain, SOB, abdominal pain, n/v/d  Admits the use of omeprazole and OTC cold and flu medication for symptom management  Positive sick contacts at home as patient's  is experiencing similar symptoms  Patient notes she has recently been treated for H pylori and completed her antibiotic course 2 weeks ago  Denies h/o respiratory and renal conditions  Review of Systems   Review of Systems   Constitutional: Positive for chills  Negative for fatigue and fever  HENT: Positive for congestion and ear pain  Negative for postnasal drip, rhinorrhea, sinus pressure, sinus pain and sore throat  Respiratory: Positive for cough  Negative for shortness of breath  Cardiovascular: Negative for chest pain     Gastrointestinal: Negative for abdominal pain, diarrhea, nausea and vomiting  Musculoskeletal: Positive for myalgias  Skin: Negative for rash  Neurological: Negative for headaches           Current Medications       Current Outpatient Medications:   •  benzonatate (TESSALON PERLES) 100 mg capsule, Take 1 capsule (100 mg total) by mouth 3 (three) times a day as needed for cough, Disp: 20 capsule, Rfl: 0  •  bismuth subsalicylate (PEPTO BISMOL) 262 MG chewable tablet, Chew 2 tablets (524 mg total) 4 (four) times a day (Patient not taking: Reported on 6/6/2023), Disp: 30 tablet, Rfl: 0  •  estradiol (ESTRACE VAGINAL) 0 1 mg/g vaginal cream, Insert 0 5 g into the vagina 2 (two) times a week, Disp: 42 5 g, Rfl: 1  •  omeprazole (PriLOSEC) 20 mg delayed release capsule, Take 1 capsule (20 mg total) by mouth 2 (two) times a day before meals Best to take 30 minutes to 1 hour before before 2 meals a day for 14 days then 1 daily for 14 days then stop (Patient not taking: Reported on 6/6/2023), Disp: 60 capsule, Rfl: 4  •  predniSONE 20 mg tablet, Take 2 tablets (40 mg total) by mouth daily for 5 days, Disp: 10 tablet, Rfl: 0  •  b complex vitamins capsule, Take 1 capsule by mouth daily (Patient not taking: Reported on 6/6/2023), Disp: , Rfl:   •  cholecalciferol (VITAMIN D3) 1,000 units tablet, Take 2,000 Units by mouth daily  (Patient not taking: Reported on 6/6/2023), Disp: , Rfl:   •  famotidine (PEPCID) 40 MG tablet, Take 1 tablet (40 mg total) by mouth daily at bedtime Take in evening 2 hours prior to bed (Patient not taking: Reported on 6/6/2023), Disp: 90 tablet, Rfl: 3  •  fluticasone (FLONASE) 50 mcg/act nasal spray, 2 sprays into each nostril daily (Patient not taking: Reported on 6/6/2023), Disp: 18 mL, Rfl: 2    Current Allergies     Allergies as of 06/19/2023 - Reviewed 06/19/2023   Allergen Reaction Noted   • Penicillins GI Intolerance 04/22/2020            The following portions of the patient's history were reviewed and "updated as appropriate: allergies, current medications, past family history, past medical history, past social history, past surgical history and problem list      Past Medical History:   Diagnosis Date   • Allergic    • SVT (supraventricular tachycardia) ([address] )        Past Surgical History:   Procedure Laterality Date   • CARDIAC ELECTROPHYSIOLOGY STUDY AND ABLATION     •  SECTION     • TUBAL LIGATION         Family History   Problem Relation Age of Onset   • No Known Problems Mother    • No Known Problems Father    • No Known Problems Sister    • BRCA2 Negative Sister    • BRCA1 Negative Sister    • Breast cancer Sister 61   • No Known Problems Sister    • No Known Problems Sister    • No Known Problems Daughter    • No Known Problems Maternal Grandmother    • No Known Problems Paternal Grandmother    • No Known Problems Maternal Aunt    • No Known Problems Paternal Aunt          Medications have been verified  Objective   /84   Pulse 103   Temp 99 °F (37 2 °C) (Temporal)   Resp 18   Ht 5' 1\" (1 549 m)   Wt 57 6 kg (127 lb)   LMP 2021 (Approximate)   SpO2 99%   BMI 24 00 kg/m²        Physical Exam     Physical Exam  Vitals reviewed  Constitutional:       General: She is not in acute distress  Appearance: Normal appearance  She is normal weight  She is not ill-appearing  HENT:      Head: Normocephalic  Right Ear: Hearing, tympanic membrane, ear canal and external ear normal  No middle ear effusion  There is no impacted cerumen  Tympanic membrane is not erythematous or bulging  Left Ear: Hearing, ear canal and external ear normal  A middle ear effusion is present  There is no impacted cerumen  Tympanic membrane is not erythematous or bulging  Nose: Congestion present  No rhinorrhea  Right Sinus: No maxillary sinus tenderness or frontal sinus tenderness  Left Sinus: No maxillary sinus tenderness or frontal sinus tenderness        Mouth/Throat:      " Lips: Pink  Mouth: Mucous membranes are moist       Pharynx: Oropharynx is clear  Uvula midline  No pharyngeal swelling, oropharyngeal exudate, posterior oropharyngeal erythema or uvula swelling  Tonsils: No tonsillar exudate or tonsillar abscesses  1+ on the right  1+ on the left  Eyes:      Conjunctiva/sclera: Conjunctivae normal    Cardiovascular:      Rate and Rhythm: Normal rate and regular rhythm  Pulses: Normal pulses  Heart sounds: Normal heart sounds  No murmur heard  No friction rub  No gallop  Pulmonary:      Effort: Pulmonary effort is normal       Breath sounds: Normal breath sounds  No wheezing, rhonchi or rales  Musculoskeletal:      Cervical back: Normal range of motion and neck supple  No tenderness  Lymphadenopathy:      Cervical: No cervical adenopathy  Skin:     General: Skin is warm  Findings: No rash  Neurological:      Mental Status: She is alert     Psychiatric:         Mood and Affect: Mood normal          Behavior: Behavior normal

## 2023-06-19 NOTE — PATIENT INSTRUCTIONS
Start prednisone as prescribed  Start tessalon perles as prescribed  Vitamin D3 2000 IU daily  Vitamin C 1000mg twice per day  Multivitamin daily  Fluids and rest  Over the counter cold medication as needed (EX: Mucinex, tylenol/motrin)  Follow up with PCP in 3-5 days  Proceed to ER if symptoms worsen

## 2023-06-29 ENCOUNTER — HOSPITAL ENCOUNTER (OUTPATIENT)
Dept: MAMMOGRAPHY | Facility: HOSPITAL | Age: 58
End: 2023-06-29
Attending: STUDENT IN AN ORGANIZED HEALTH CARE EDUCATION/TRAINING PROGRAM
Payer: COMMERCIAL

## 2023-06-29 VITALS — WEIGHT: 124 LBS | HEIGHT: 61 IN | BODY MASS INDEX: 23.41 KG/M2

## 2023-06-29 DIAGNOSIS — Z12.31 SCREENING MAMMOGRAM FOR BREAST CANCER: ICD-10-CM

## 2023-06-29 PROCEDURE — 77067 SCR MAMMO BI INCL CAD: CPT

## 2023-06-29 PROCEDURE — 77063 BREAST TOMOSYNTHESIS BI: CPT

## 2023-07-28 ENCOUNTER — APPOINTMENT (OUTPATIENT)
Dept: LAB | Facility: HOSPITAL | Age: 58
End: 2023-07-28
Attending: INTERNAL MEDICINE
Payer: COMMERCIAL

## 2023-07-28 DIAGNOSIS — A04.8 H. PYLORI INFECTION: ICD-10-CM

## 2023-07-28 PROCEDURE — 87338 HPYLORI STOOL AG IA: CPT

## 2023-07-29 LAB — H PYLORI AG STL QL IA: NEGATIVE

## 2023-07-31 NOTE — RESULT ENCOUNTER NOTE
Please inform patient that H. pylori stool antigen is negative confirming complete eradication of H. pylori.   Thank you

## 2023-11-15 ENCOUNTER — DOCUMENTATION (OUTPATIENT)
Dept: GASTROENTEROLOGY | Facility: CLINIC | Age: 58
End: 2023-11-15

## 2023-11-15 NOTE — PROGRESS NOTES
[facility]astroenterology  Gastroenterology Outpatient Follow up  Patient Elidia Galloway   Age 62 y.o. Gender female   MRN: [id number]  [facility] [id number]     ASSESSMENT AND PLAN:   Problem List Items Addressed This Visit          Digestive    Gastroesophageal reflux disease with esophagitis without hemorrhage - Primary     Hayley Stafford does have a relatively long history of GERD. She had been on Nexium for period of time and then stopped taking the medication. At this point in time she is not complaining of much heartburn or regurgitation. She will continue famotidine 40 mg 2 hours before bed. If she notices increased symptoms, then I would consider the addition of Nexium (esomeprasole) 20 mg up to 40 mg daily. For now I would discontinue the famotidine 40 mg 2 hours before bed. Lifestyle modifications for gastroesophageal reflux disease were discussed and include limiting fried and fatty foods, mints, chocolates, carbonated and caffeinated beverages , and alcohol, etc.  Avoid lying down for 2-3 hours after meals. If you have nighttime symptoms consider raising the head of the bed up on 4-6 inch blocks. Pillows typically are not useful. If you are overweight, weight loss will be helpful. Other    H. pylori infection     Hayley Stafford was treated for H. pylori in May 2023 with quadruple therapy containing metronidazole, tetracycline, Pepto-Bismol, and omeprazole. She did have a stool H. pylori antigen in July 2023 that was negative. She does recall being treated for H. pylori about 10 years ago or so. Looks like her H. pylori has been eradicated. She should continue to monitor her symptoms and let us know if she has any recurrent dyspeptic symptoms. Consider repeating upper endoscopy in about 3 years. History of colon polyps     Hayley Stafford was noted to have a 6 mm flat polyp in ascending colon, this was a serrated adenoma. There is a 4 mm polyp at 45 cm that was a tubular adenoma.   Based upon these findings I recommend repeating colonoscopy around May 2028.           _____________________________________________________________    HPI:    José Antonio Barnes is a delightful 80-year-old woman home seen in the office in follow-up for epigastric burning, H. pylori positivity and regurgitation. José Antonio Barnes completed a 14-day course of quadruple therapy for H. pylori which included metronidazole, tetracycline, Pepto-Bismol, and a PPI. She is currently on famotidine 40 mg 2 hours before bed. She has been feeling much better since her H. pylori treatment. This was her second bout with H. pylori. She believes that she was treated for this many years ago. Patient also reports that her sister was recently diagnosed with H. pylori. Sister lives in [facility].    She reports now she will have a fleeting burning sensation epigastric region associated with a fleeting hot flash and then will go away. Again this is very short-lived and lasts only seconds. There is no associated flushing or nausea or vomiting. She feels like it is a split-second of anxiety and it goes away. There is been no dysphagia. Her appetites been excellent. Denies any early satiety. She is currently using famotidine 40 mg 2 hours before bed. There is been no unintentional weight loss. Part bowel habits overall been okay although they do tend towards constipation. She feels certain fiber products may constipate her more. She did fine a psyllium containing herbal supplement that seems to help her. There is been no rectal bleeding or melena. Records reviewed for 15 minutes prior to office visit    7/28/2023 stool H. pylori antigen-negative    5/16/2023 colonoscopy  Normal terminal ileum  6 mm flat polyp proximal ascending colon status post cold snare polypectomy. Sessile serrated adenoma. 4 mm flat polyp at 45 cm status post cold snare polypectomy.   Tubular adenoma  Very mild diverticulosis sigmoid colon  Small external hemorrhoids    5/16/2023 EGD  Normal duodenum to the second portion status post biopsy rule out celiac disease biopsies negative for celiac disease. Mild antral erythema status post biopsy of the gastric body and antrum to rule out H. pylori. Biopsy positive for H. pylori. LA grade A esophagitis  Friability and fine nodularity at 38 cm GE junction status post biopsy. Biopsy revealed chronic inflammation and reactive changes. Negative for intestinal metaplasia. Mild linear furrows in the distal and middle third of the esophagus status post biopsy. Biopsies negative for EOE.    4/19/2023 laboratory data  WC 6.51 Hgb 14.8 HCT 45.4 MCV 94 platelet count [phone]       6/30/2020 ultrasound right upper quadrant-negative  10/3/2022 CBC and chemistry complete were okay     Patient does report having had an upper endoscopy many years ago. She does report a history of H. pylori and was treated with antibiotics.   This was diagnosed at time of upper endoscopy       Allergies   Allergen Reactions    Penicillins GI Intolerance     Current Outpatient Medications   Medication Sig Dispense Refill    benzonatate (TESSALON PERLES) 100 mg capsule Take 1 capsule (100 mg total) by mouth 3 (three) times a day as needed for cough 20 capsule 0    estradiol (ESTRACE VAGINAL) 0.1 mg/g vaginal cream Insert 0.5 g into the vagina 2 (two) times a week 42.5 g 1    b complex vitamins capsule Take 1 capsule by mouth daily (Patient not taking: Reported on 6/6/2023)      bismuth subsalicylate (PEPTO BISMOL) 262 MG chewable tablet Chew 2 tablets (524 mg total) 4 (four) times a day (Patient not taking: Reported on 6/6/2023) 30 tablet 0    cholecalciferol (VITAMIN D3) 1,000 units tablet Take 2,000 Units by mouth daily  (Patient not taking: Reported on 6/6/2023)      famotidine (PEPCID) 40 MG tablet Take 1 tablet (40 mg total) by mouth daily at bedtime Take in evening 2 hours prior to bed (Patient not taking: Reported on 6/6/2023) 90 tablet 3    fluticasone (FLONASE) 50 mcg/act nasal spray 2 sprays into each nostril daily (Patient not taking: Reported on 2023) 18 mL 2    omeprazole (PriLOSEC) 20 mg delayed release capsule Take 1 capsule (20 mg total) by mouth 2 (two) times a day before meals Best to take 30 minutes to 1 hour before before 2 meals a day for 14 days then 1 daily for 14 days then stop (Patient not taking: Reported on 2023) 60 capsule 4     No current facility-administered medications for this visit. MEDICAL HISTORY:  Past Medical History:   Diagnosis Date    Allergic     SVT (supraventricular tachycardia)      Past Surgical History:   Procedure Laterality Date    CARDIAC ELECTROPHYSIOLOGY STUDY AND ABLATION       SECTION      TUBAL LIGATION       Social History     Substance and Sexual Activity   Alcohol Use Yes    Alcohol/week: 1.0 standard drink of alcohol    Types: 1 Glasses of wine per week    Comment: occasional     Social History     Substance and Sexual Activity   Drug Use No     Social History     Tobacco Use   Smoking Status Never   Smokeless Tobacco Never     Family History   Problem Relation Age of Onset    No Known Problems Mother     No Known Problems Father     No Known Problems Sister     BRCA2 Negative Sister     BRCA1 Negative Sister     Breast cancer Sister 61    No Known Problems Sister     No Known Problems Sister     No Known Problems Daughter     No Known Problems Maternal Grandmother     No Known Problems Paternal Grandmother     No Known Problems Maternal Aunt     No Known Problems Paternal Aunt          Objective   Blood pressure 118/68, pulse 91, resp. rate 18, height 5' 1" (1.549 m), weight 56.7 kg (125 lb), last menstrual period 2021, SpO2 98 %. Body mass index is 23.62 kg/m². PHYSICAL EXAM:   General Appearance: Alert, cooperative, no distress  HEENT: Normocephalic, atraumatic, anicteric.    Neck: Supple, symmetrical, trachea midline  Lungs: Clear to auscultation bilaterally; no rales, rhonchi or wheezing; respirations unlabored   Heart: Regular rate and rhythm; no murmur, rub, or gallop. Abdomen: Soft, bowel sounds normal, non-tender, non-distended; no masses, there is no hepatosplenomegaly. No spider angiomas  Genitalia: Deferred   Rectal: Deferred   Extremities: No cyanosis, clubbing or edema   Skin: No jaundice, rashes, or lesions   Lymph nodes: No palpable cervical lymphadenopathy   Lab Results:   No visits with results within 2 Month(s) from this visit. Latest known visit with results is:   Lab on 07/28/2023   Component Date Value    H pylori Ag, Stl 07/28/2023 Negative      Radiology Results:   No results found.   Palak Kwong DO   11/16/23   Cc: diffuse

## 2023-11-15 NOTE — PROGRESS NOTES
Records reviewed in preparation for office visit 11/16/2023 and outlined below    7/28/2023 stool H. pylori antigen-negative    5/16/2023 colonoscopy  Normal terminal ileum  6 mm flat polyp proximal ascending colon status post cold snare polypectomy. Sessile serrated adenoma. 4 mm flat polyp at 45 cm status post cold snare polypectomy. Tubular adenoma  Very mild diverticulosis sigmoid colon  Small external hemorrhoids    5/16/2023 EGD  Normal duodenum to the second portion status post biopsy rule out celiac disease biopsies negative for celiac disease. Mild antral erythema status post biopsy of the gastric body and antrum to rule out H. pylori. Biopsy positive for H. pylori. LA grade A esophagitis  Friability and fine nodularity at 38 cm GE junction status post biopsy. Biopsy revealed chronic inflammation and reactive changes. Negative for intestinal metaplasia. Mild linear furrows in the distal and middle third of the esophagus status post biopsy. Biopsies negative for EOE.    4/19/2023 laboratory data  WC 6.51 Hgb 14.8 HCT 45.4 MCV 94 platelet count [phone]       6/30/2020 ultrasound right upper quadrant-negative  10/3/2022 CBC and chemistry complete were okay     Patient does report having had an upper endoscopy many years ago. She does report a history of H. pylori and was treated with antibiotics.   This was diagnosed at time of upper endoscopy

## 2023-11-16 ENCOUNTER — OFFICE VISIT (OUTPATIENT)
Dept: GASTROENTEROLOGY | Facility: CLINIC | Age: 58
End: 2023-11-16
Payer: COMMERCIAL

## 2023-11-16 VITALS
OXYGEN SATURATION: 98 % | HEIGHT: 61 IN | DIASTOLIC BLOOD PRESSURE: 68 MMHG | RESPIRATION RATE: 18 BRPM | BODY MASS INDEX: 23.6 KG/M2 | WEIGHT: 125 LBS | HEART RATE: 91 BPM | SYSTOLIC BLOOD PRESSURE: 118 MMHG

## 2023-11-16 DIAGNOSIS — A04.8 H. PYLORI INFECTION: ICD-10-CM

## 2023-11-16 DIAGNOSIS — Z86.010 HISTORY OF COLON POLYPS: ICD-10-CM

## 2023-11-16 DIAGNOSIS — K21.00 GASTROESOPHAGEAL REFLUX DISEASE WITH ESOPHAGITIS WITHOUT HEMORRHAGE: Primary | ICD-10-CM

## 2023-11-16 PROBLEM — Z86.0100 HISTORY OF COLON POLYPS: Status: ACTIVE | Noted: 2023-11-16

## 2023-11-16 PROCEDURE — 99214 OFFICE O/P EST MOD 30 MIN: CPT | Performed by: INTERNAL MEDICINE

## 2023-11-16 NOTE — ASSESSMENT & PLAN NOTE
Hayley Stafford was treated for H. pylori in May 2023 with quadruple therapy containing metronidazole, tetracycline, Pepto-Bismol, and omeprazole. She did have a stool H. pylori antigen in July 2023 that was negative. She does recall being treated for H. pylori about 10 years ago or so. Looks like her H. pylori has been eradicated. She should continue to monitor her symptoms and let us know if she has any recurrent dyspeptic symptoms. Consider repeating upper endoscopy in about 3 years. Patient would feel more comfortable with close follow-up therefore I will see her in follow-up in about 6 months.

## 2023-11-16 NOTE — ASSESSMENT & PLAN NOTE
Bonnie Tran does have a relatively long history of GERD. She had been on Nexium for period of time and then stopped taking the medication. At this point in time she is not complaining of much heartburn or regurgitation. She will continue famotidine 40 mg 2 hours before bed. If she notices increased symptoms, then I would consider the addition of Nexium (esomeprasole) 20 mg up to 40 mg daily. For now I would discontinue the famotidine 40 mg 2 hours before bed. Lifestyle modifications for gastroesophageal reflux disease were discussed and include limiting fried and fatty foods, mints, chocolates, carbonated and caffeinated beverages , and alcohol, etc.  Avoid lying down for 2-3 hours after meals. If you have nighttime symptoms consider raising the head of the bed up on 4-6 inch blocks. Pillows typically are not useful. If you are overweight, weight loss will be helpful.

## 2023-11-16 NOTE — ASSESSMENT & PLAN NOTE
Bee Hinton was noted to have a 6 mm flat polyp in ascending colon, this was a serrated adenoma. There is a 4 mm polyp at 45 cm that was a tubular adenoma. Based upon these findings I recommend repeating colonoscopy around May 2028.

## 2023-11-16 NOTE — PATIENT INSTRUCTIONS
Gastroesophageal reflux disease with esophagitis without hemorrhage  Rebecca Beach does have a relatively long history of GERD. She had been on Nexium for period of time and then stopped taking the medication. At this point in time she is not complaining of much heartburn or regurgitation. She will continue famotidine 40 mg 2 hours before bed. If she notices increased symptoms, then I would consider the addition of Nexium (esomeprasole) 20 mg up to 40 mg daily. For now I would discontinue the famotidine 40 mg 2 hours before bed. Lifestyle modifications for gastroesophageal reflux disease were discussed and include limiting fried and fatty foods, mints, chocolates, carbonated and caffeinated beverages , and alcohol, etc.  Avoid lying down for 2-3 hours after meals. If you have nighttime symptoms consider raising the head of the bed up on 4-6 inch blocks. Pillows typically are not useful. If you are overweight, weight loss will be helpful. H. pylori infection  Rebecca Beach was treated for H. pylori in May 2023 with quadruple therapy containing metronidazole, tetracycline, Pepto-Bismol, and omeprazole. She did have a stool H. pylori antigen in July 2023 that was negative. She does recall being treated for H. pylori about 10 years ago or so. Looks like her H. pylori has been eradicated. She should continue to monitor her symptoms and let us know if she has any recurrent dyspeptic symptoms. Consider repeating upper endoscopy in about 3 years. Patient would feel more comfortable with close follow-up therefore I will see her in follow-up in about 6 months. History of colon polyps  Rebecca Beach was noted to have a 6 mm flat polyp in ascending colon, this was a serrated adenoma. There is a 4 mm polyp at 45 cm that was a tubular adenoma. Based upon these findings I recommend repeating colonoscopy around May 2028.

## 2023-11-20 ENCOUNTER — TELEPHONE (OUTPATIENT)
Dept: SURGERY | Facility: CLINIC | Age: 58
End: 2023-11-20

## 2023-11-20 DIAGNOSIS — L03.039: Primary | ICD-10-CM

## 2023-11-20 RX ORDER — SULFAMETHOXAZOLE AND TRIMETHOPRIM 800; 160 MG/1; MG/1
1 TABLET ORAL EVERY 12 HOURS SCHEDULED
Qty: 14 TABLET | Refills: 0 | Status: SHIPPED | OUTPATIENT
Start: 2023-11-20 | End: 2023-11-27

## 2023-11-20 NOTE — TELEPHONE ENCOUNTER
Patient calling reporting signs/symptoms of infected ingrown nail. Will send Rx for Bactrim DS. Will have her come in to office for appointment for further evaluation. Patient agreeable.

## 2023-11-20 NOTE — TELEPHONE ENCOUNTER
----- Message from Layo Orellana [address] PASindyC sent at 11/20/2023  8:26 AM EST -----  Please call to offer patient office visit early next week for infected ingrown toe nail. She may cancel if she gets in sooner with her podiatrist or come in sooner if symptoms worsen.

## 2023-11-30 ENCOUNTER — OFFICE VISIT (OUTPATIENT)
Dept: URGENT CARE | Facility: CLINIC | Age: 58
End: 2023-11-30
Payer: COMMERCIAL

## 2023-11-30 VITALS
WEIGHT: 126 LBS | HEART RATE: 96 BPM | TEMPERATURE: 98.1 F | RESPIRATION RATE: 18 BRPM | SYSTOLIC BLOOD PRESSURE: 122 MMHG | HEIGHT: 61 IN | OXYGEN SATURATION: 97 % | DIASTOLIC BLOOD PRESSURE: 74 MMHG | BODY MASS INDEX: 23.79 KG/M2

## 2023-11-30 DIAGNOSIS — R05.1 ACUTE COUGH: Primary | ICD-10-CM

## 2023-11-30 LAB
SARS-COV-2 AG UPPER RESP QL IA: NEGATIVE
VALID CONTROL: NORMAL

## 2023-11-30 PROCEDURE — 99213 OFFICE O/P EST LOW 20 MIN: CPT | Performed by: NURSE PRACTITIONER

## 2023-11-30 PROCEDURE — 87811 SARS-COV-2 COVID19 W/OPTIC: CPT | Performed by: NURSE PRACTITIONER

## 2023-11-30 RX ORDER — PREDNISONE 20 MG/1
20 TABLET ORAL 2 TIMES DAILY WITH MEALS
Qty: 10 TABLET | Refills: 0 | Status: SHIPPED | OUTPATIENT
Start: 2023-11-30 | End: 2023-12-05

## 2023-11-30 RX ORDER — BENZONATATE 100 MG/1
100 CAPSULE ORAL 3 TIMES DAILY PRN
Qty: 20 CAPSULE | Refills: 0 | Status: SHIPPED | OUTPATIENT
Start: 2023-11-30 | End: 2023-12-05

## 2023-11-30 NOTE — PATIENT INSTRUCTIONS
Take medication as directed. Rest and drink plenty of fluids. A cool mist humidifier can be helpful. If you develop a worsening cough, chest pain, shortness of breath, palpitations, coughing up blood, prolonged high fever, any new or concerning symptoms please return or proceed ER.   Recommend following up with PCP in 3-5 days

## 2023-11-30 NOTE — PROGRESS NOTES
[facility]        NAME: Ruben Fierro is a 62 y.o. female  : 1965    MRN: [id number]  DATE: 2023  TIME: 5:10 PM    Assessment and Plan   Acute cough [R05.1]  1. Acute cough  Poct Covid 19 Rapid Antigen Test    benzonatate (TESSALON PERLES) 100 mg capsule    predniSONE 20 mg tablet        Covid negative. Patient Instructions     Patient Instructions   Take medication as directed. Rest and drink plenty of fluids. A cool mist humidifier can be helpful. If you develop a worsening cough, chest pain, shortness of breath, palpitations, coughing up blood, prolonged high fever, any new or concerning symptoms please return or proceed ER. Recommend following up with PCP in 3-5 days      Chief Complaint     Chief Complaint   Patient presents with    Cough     Started 3 days ago. Worse at night. Has a productive cough from post nasal drip. History of Present Illness       Cough  This is a new problem. The current episode started in the past 7 days (3 days ago). The problem has been unchanged. The problem occurs every few minutes. The cough is Non-productive. Associated symptoms include postnasal drip. Pertinent negatives include no chest pain, chills, ear pain, fever, headaches, myalgias, nasal congestion, rash, rhinorrhea, sore throat, shortness of breath or wheezing. Nothing aggravates the symptoms. She has tried nothing for the symptoms. There is no history of asthma or COPD. Review of Systems   Review of Systems   Constitutional:  Negative for chills, diaphoresis, fatigue and fever. HENT:  Positive for congestion and postnasal drip. Negative for ear pain, facial swelling, rhinorrhea, sinus pressure, sinus pain, sore throat, tinnitus and trouble swallowing. Eyes: Negative. Respiratory:  Positive for cough. Negative for chest tightness, shortness of breath, wheezing and stridor. Cardiovascular:  Negative for chest pain and palpitations.    Gastrointestinal: Negative. Musculoskeletal:  Negative for arthralgias, back pain, joint swelling, myalgias, neck pain and neck stiffness. Skin:  Negative for rash. Neurological:  Negative for dizziness, facial asymmetry, weakness, light-headedness, numbness and headaches.          Current Medications       Current Outpatient Medications:     benzonatate (TESSALON PERLES) 100 mg capsule, Take 1 capsule (100 mg total) by mouth 3 (three) times a day as needed for cough, Disp: 20 capsule, Rfl: 0    cholecalciferol (VITAMIN D3) 1,000 units tablet, Take 2,000 Units by mouth daily, Disp: , Rfl:     famotidine (PEPCID) 40 MG tablet, Take 1 tablet (40 mg total) by mouth daily at bedtime Take in evening 2 hours prior to bed, Disp: 90 tablet, Rfl: 3    fluticasone (FLONASE) 50 mcg/act nasal spray, 2 sprays into each nostril daily, Disp: 18 mL, Rfl: 2    predniSONE 20 mg tablet, Take 1 tablet (20 mg total) by mouth 2 (two) times a day with meals for 5 days, Disp: 10 tablet, Rfl: 0    b complex vitamins capsule, Take 1 capsule by mouth daily (Patient not taking: Reported on 6/6/2023), Disp: , Rfl:     bismuth subsalicylate (PEPTO BISMOL) 262 MG chewable tablet, Chew 2 tablets (524 mg total) 4 (four) times a day (Patient not taking: Reported on 6/6/2023), Disp: 30 tablet, Rfl: 0    estradiol (ESTRACE VAGINAL) 0.1 mg/g vaginal cream, Insert 0.5 g into the vagina 2 (two) times a week (Patient not taking: Reported on 11/30/2023), Disp: 42.5 g, Rfl: 1    omeprazole (PriLOSEC) 20 mg delayed release capsule, Take 1 capsule (20 mg total) by mouth 2 (two) times a day before meals Best to take 30 minutes to 1 hour before before 2 meals a day for 14 days then 1 daily for 14 days then stop (Patient not taking: Reported on 6/6/2023), Disp: 60 capsule, Rfl: 4    Current Allergies     Allergies as of 11/30/2023 - Reviewed 11/30/2023   Allergen Reaction Noted    Penicillins GI Intolerance 04/22/2020            The following portions of the patient's history were reviewed and updated as appropriate: allergies, current medications, past family history, past medical history, past social history, past surgical history and problem list.     Past Medical History:   Diagnosis Date    Allergic     SVT (supraventricular tachycardia)        Past Surgical History:   Procedure Laterality Date    CARDIAC ELECTROPHYSIOLOGY STUDY AND ABLATION       SECTION      TUBAL LIGATION         Family History   Problem Relation Age of Onset    No Known Problems Mother     No Known Problems Father     No Known Problems Sister     BRCA2 Negative Sister     BRCA1 Negative Sister     Breast cancer Sister 61    No Known Problems Sister     No Known Problems Sister     No Known Problems Daughter     No Known Problems Maternal Grandmother     No Known Problems Paternal Grandmother     No Known Problems Maternal Aunt     No Known Problems Paternal Aunt          Medications have been verified. Objective   /74   Pulse 96   Temp 98.1 °F (36.7 °C)   Resp 18   Ht 5' 1" (1.549 m)   Wt 57.2 kg (126 lb)   LMP 2021 (Approximate)   SpO2 97%   BMI 23.81 kg/m²   Patient's last menstrual period was 2021 (approximate). Physical Exam     Physical Exam  Constitutional:       General: She is not in acute distress. Appearance: She is well-developed. She is not diaphoretic. HENT:      Head: Normocephalic and atraumatic. Right Ear: Hearing, tympanic membrane, ear canal and external ear normal.      Left Ear: Hearing, tympanic membrane, ear canal and external ear normal.      Nose: Congestion and rhinorrhea present. No mucosal edema. Right Sinus: No maxillary sinus tenderness or frontal sinus tenderness. Left Sinus: No maxillary sinus tenderness or frontal sinus tenderness. Mouth/Throat:      Mouth: Mucous membranes are moist.      Pharynx: Oropharynx is clear. Uvula midline.    Cardiovascular:      Rate and Rhythm: Normal rate and regular rhythm. Heart sounds: Normal heart sounds, S1 normal and S2 normal.   Pulmonary:      Effort: Pulmonary effort is normal.      Breath sounds: Normal breath sounds and air entry. Lymphadenopathy:      Cervical: No cervical adenopathy. Skin:     General: Skin is warm and dry. Capillary Refill: Capillary refill takes less than 2 seconds. Neurological:      Mental Status: She is alert and oriented to person, place, and time.

## 2023-12-04 ENCOUNTER — APPOINTMENT (OUTPATIENT)
Dept: LAB | Facility: HOSPITAL | Age: 58
End: 2023-12-04
Payer: COMMERCIAL

## 2023-12-04 DIAGNOSIS — Z13.220 SCREENING CHOLESTEROL LEVEL: ICD-10-CM

## 2023-12-04 DIAGNOSIS — Z00.00 ANNUAL PHYSICAL EXAM: ICD-10-CM

## 2023-12-04 LAB
ALBUMIN SERPL BCP-MCNC: 4.6 G/DL (ref 3.5–5)
ALP SERPL-CCNC: 72 U/L (ref 34–104)
ALT SERPL W P-5'-P-CCNC: 16 U/L (ref 7–52)
ANION GAP SERPL CALCULATED.3IONS-SCNC: 8 MMOL/L
AST SERPL W P-5'-P-CCNC: 14 U/L (ref 13–39)
BASOPHILS # BLD AUTO: 0.01 THOUSANDS/ÂΜL (ref 0–0.1)
BASOPHILS NFR BLD AUTO: 0 % (ref 0–1)
BILIRUB SERPL-MCNC: 0.43 MG/DL (ref 0.2–1)
BUN SERPL-MCNC: 15 MG/DL (ref 5–25)
CALCIUM SERPL-MCNC: 9.3 MG/DL (ref 8.4–10.2)
CHLORIDE SERPL-SCNC: 101 MMOL/L (ref 96–108)
CHOLEST SERPL-MCNC: 215 MG/DL
CO2 SERPL-SCNC: 28 MMOL/L (ref 21–32)
CREAT SERPL-MCNC: 0.76 MG/DL (ref 0.6–1.3)
EOSINOPHIL # BLD AUTO: 0 THOUSAND/ÂΜL (ref 0–0.61)
EOSINOPHIL NFR BLD AUTO: 0 % (ref 0–6)
ERYTHROCYTE [DISTWIDTH] IN BLOOD BY AUTOMATED COUNT: 12.8 % (ref 11.6–15.1)
GFR SERPL CREATININE-BSD FRML MDRD: 86 ML/MIN/1.73SQ M
GLUCOSE P FAST SERPL-MCNC: 114 MG/DL (ref 65–99)
HCT VFR BLD AUTO: 45.6 % (ref 34.8–46.1)
HDLC SERPL-MCNC: 83 MG/DL
HGB BLD-MCNC: 14.4 G/DL (ref 11.5–15.4)
IMM GRANULOCYTES # BLD AUTO: 0.03 THOUSAND/UL (ref 0–0.2)
IMM GRANULOCYTES NFR BLD AUTO: 0 % (ref 0–2)
LDLC SERPL CALC-MCNC: 120 MG/DL (ref 0–100)
LYMPHOCYTES # BLD AUTO: 1.4 THOUSANDS/ÂΜL (ref 0.6–4.47)
LYMPHOCYTES NFR BLD AUTO: 15 % (ref 14–44)
MCH RBC QN AUTO: 29.8 PG (ref 26.8–34.3)
MCHC RBC AUTO-ENTMCNC: 31.6 G/DL (ref 31.4–37.4)
MCV RBC AUTO: 94 FL (ref 82–98)
MONOCYTES # BLD AUTO: 0.46 THOUSAND/ÂΜL (ref 0.17–1.22)
MONOCYTES NFR BLD AUTO: 5 % (ref 4–12)
NEUTROPHILS # BLD AUTO: 7.29 THOUSANDS/ÂΜL (ref 1.85–7.62)
NEUTS SEG NFR BLD AUTO: 80 % (ref 43–75)
NONHDLC SERPL-MCNC: 132 MG/DL
NRBC BLD AUTO-RTO: 0 /100 WBCS
PLATELET # BLD AUTO: 306 THOUSANDS/UL (ref 149–390)
PMV BLD AUTO: 8.8 FL (ref 8.9–12.7)
POTASSIUM SERPL-SCNC: 3.8 MMOL/L (ref 3.5–5.3)
PROT SERPL-MCNC: 7.2 G/DL (ref 6.4–8.4)
RBC # BLD AUTO: 4.84 MILLION/UL (ref 3.81–5.12)
SODIUM SERPL-SCNC: 137 MMOL/L (ref 135–147)
TRIGL SERPL-MCNC: 58 MG/DL
WBC # BLD AUTO: 9.19 THOUSAND/UL (ref 4.31–10.16)

## 2023-12-04 PROCEDURE — 85025 COMPLETE CBC W/AUTO DIFF WBC: CPT

## 2023-12-04 PROCEDURE — 80061 LIPID PANEL: CPT

## 2023-12-04 PROCEDURE — 80053 COMPREHEN METABOLIC PANEL: CPT

## 2023-12-04 PROCEDURE — 36415 COLL VENOUS BLD VENIPUNCTURE: CPT

## 2023-12-05 ENCOUNTER — OFFICE VISIT (OUTPATIENT)
Dept: FAMILY MEDICINE CLINIC | Facility: CLINIC | Age: 58
End: 2023-12-05
Payer: COMMERCIAL

## 2023-12-05 VITALS
RESPIRATION RATE: 18 BRPM | BODY MASS INDEX: 23.41 KG/M2 | SYSTOLIC BLOOD PRESSURE: 118 MMHG | DIASTOLIC BLOOD PRESSURE: 70 MMHG | HEART RATE: 92 BPM | OXYGEN SATURATION: 98 % | TEMPERATURE: 100.1 F | WEIGHT: 124 LBS | HEIGHT: 61 IN

## 2023-12-05 DIAGNOSIS — J30.1 ALLERGIC RHINITIS DUE TO POLLEN, UNSPECIFIED SEASONALITY: ICD-10-CM

## 2023-12-05 DIAGNOSIS — Z13.220 SCREENING CHOLESTEROL LEVEL: ICD-10-CM

## 2023-12-05 DIAGNOSIS — R73.01 IMPAIRED FASTING GLUCOSE: ICD-10-CM

## 2023-12-05 DIAGNOSIS — Z00.00 ANNUAL PHYSICAL EXAM: Primary | ICD-10-CM

## 2023-12-05 PROCEDURE — 99396 PREV VISIT EST AGE 40-64: CPT | Performed by: NURSE PRACTITIONER

## 2023-12-05 RX ORDER — FLUTICASONE PROPIONATE 50 MCG
2 SPRAY, SUSPENSION (ML) NASAL DAILY
Qty: 18 ML | Refills: 5 | Status: SHIPPED | OUTPATIENT
Start: 2023-12-05

## 2023-12-05 RX ORDER — CHLORPHENIRAMINE MALEATE 4 MG/1
4 TABLET ORAL
COMMUNITY

## 2023-12-05 RX ORDER — UREA 40 %
CREAM (GRAM) TOPICAL DAILY
COMMUNITY

## 2023-12-05 NOTE — PATIENT INSTRUCTIONS
Allegra 180mg daily  Continue Flonase daily  Get repeat bloodwork done in 6 months  6 month lab review or return sooner for problems/concerns

## 2023-12-05 NOTE — PROGRESS NOTES
Here for annual physical  Questions about possible allergies. Symptoms started after antibiotics for H. Pylori in May. Reports dripping in back of throat, she had bronchitis- treated at urgent care with Prednisone once in the summer and recently this week. In the past she tested positive for dust mites and mold. Is using Flonase for an entire month. She is taking a 4 hour antihistamine at bedtime for sleep        Lisa Pineda is a 62 y.o. female here for her yearly health maintenance exam.   Patient Active Problem List   Diagnosis    SVT (supraventricular tachycardia)    Vitamin D deficiency    Depression screening negative    BMI 23.0-23.9, adult    Other fatigue    Calcific tendonitis of left shoulder    Epigastric burning sensation    Gastroesophageal reflux disease with esophagitis without hemorrhage    History of Helicobacter pylori infection    H. pylori infection    History of colon polyps     Past Medical History:   Diagnosis Date    Allergic     SVT (supraventricular tachycardia)          Depression Screening and Follow-up Plan: Patient was screened for depression during today's encounter. They screened negative with a PHQ-2 score of 0. PHQ-2/9 Depression Screening    Little interest or pleasure in doing things: 0 - not at all  Feeling down, depressed, or hopeless: 0 - not at all  PHQ-2 Score: 0  PHQ-2 Interpretation: Negative depression screen           Current Outpatient Medications   Medication Sig Dispense Refill    chlorpheniramine (CHLOR-TRIMETON) 4 MG tablet Take 4 mg by mouth daily at bedtime      famotidine (PEPCID) 40 MG tablet Take 1 tablet (40 mg total) by mouth daily at bedtime Take in evening 2 hours prior to bed 90 tablet 3    fluticasone (FLONASE) 50 mcg/act nasal spray 2 sprays into each nostril daily 18 mL 5    urea (CARMOL) 40 % Apply topically daily       No current facility-administered medications for this visit.      Allergies   Allergen Reactions    Penicillins GI Intolerance     Immunization History   Administered Date(s) Administered    COVID-19 MODERNA VACC 0.25 ML IM BOOSTER 12/21/2021    COVID-19 MODERNA VACC 0.5 ML IM 12/23/2020, 01/19/2021    Tdap 06/22/2020       Patient Care Team:  Katie Lomeli as PCP - General (Family Medicine)  Lashonda Pineda, [id number] [facility],  (Gastroenterology)    Review of Systems   Constitutional:  Negative for activity change, diaphoresis, fatigue and fever. HENT:  Positive for congestion, postnasal drip and rhinorrhea. Negative for facial swelling, hearing loss, sinus pressure, sinus pain, sneezing, sore throat and voice change. Eyes:  Negative for discharge and visual disturbance. Respiratory:  Negative for cough, choking, chest tightness, shortness of breath, wheezing and stridor. Cardiovascular:  Negative for chest pain, palpitations and leg swelling. Gastrointestinal:  Negative for abdominal distention, abdominal pain, constipation, diarrhea, nausea and vomiting. Endocrine: Negative for polydipsia, polyphagia and polyuria. Genitourinary:  Negative for difficulty urinating, dysuria, frequency and urgency. Musculoskeletal:  Negative for arthralgias, back pain, gait problem, joint swelling, myalgias, neck pain and neck stiffness. Skin:  Negative for color change, rash and wound. Neurological:  Negative for dizziness, syncope, speech difficulty, weakness, light-headedness and headaches. Hematological:  Negative for adenopathy. Does not bruise/bleed easily. Psychiatric/Behavioral:  Negative for agitation, behavioral problems, confusion, hallucinations, sleep disturbance and suicidal ideas. The patient is not nervous/anxious. Physical Exam :  Physical Exam  Vitals and nursing note reviewed. Constitutional:       General: She is not in acute distress. Appearance: Normal appearance. She is well-developed. She is not diaphoretic. HENT:      Head: Normocephalic and atraumatic.       Right Ear: Tympanic membrane, ear canal and external ear normal.      Left Ear: Tympanic membrane, ear canal and external ear normal.      Nose: Nose normal.      Right Sinus: No maxillary sinus tenderness or frontal sinus tenderness. Left Sinus: No maxillary sinus tenderness or frontal sinus tenderness. Mouth/Throat:      Mouth: Mucous membranes are moist.      Pharynx: Uvula midline. No oropharyngeal exudate. Comments: Post nasal drip- clear, noted  Eyes:      General:         Right eye: No discharge. Left eye: No discharge. Conjunctiva/sclera: Conjunctivae normal.      Pupils: Pupils are equal, round, and reactive to light. Neck:      Thyroid: No thyromegaly. Trachea: No tracheal deviation. Cardiovascular:      Rate and Rhythm: Normal rate and regular rhythm. Heart sounds: Normal heart sounds. No murmur heard. No friction rub. No gallop. Pulmonary:      Effort: Pulmonary effort is normal. No respiratory distress. Breath sounds: Normal breath sounds. No wheezing or rales. Abdominal:      General: Bowel sounds are normal. There is no distension. Palpations: Abdomen is soft. There is no mass. Tenderness: There is no abdominal tenderness. There is no guarding or rebound. Musculoskeletal:         General: No tenderness or deformity. Normal range of motion. Cervical back: Normal range of motion and neck supple. Right lower leg: No edema. Left lower leg: No edema. Lymphadenopathy:      Cervical: No cervical adenopathy. Skin:     General: Skin is warm and dry. Findings: No erythema or rash. Neurological:      Mental Status: She is alert and oriented to person, place, and time. Cranial Nerves: No cranial nerve deficit. Coordination: Coordination normal.   Psychiatric:         Mood and Affect: Mood normal.         Speech: Speech normal.         Behavior: Behavior normal.         Thought Content:  Thought content normal. Judgment: Judgment normal.           Reviewed Updated [facility] Prior Wellness Visits:   Last Health Maintenance visit information was reviewed, patient interviewed , no change since last HM visit yes  Last HM visit information was reviewed, patient interviewed and updates made to the record today yes    Assessment and Plan:  1. Annual physical exam  Comprehensive metabolic panel    CBC and differential      2. Screening cholesterol level  Lipid panel      3. Impaired fasting glucose  HEMOGLOBIN A1C W/ EAG ESTIMATION      4.  Allergic rhinitis due to pollen, unspecified seasonality  fluticasone (FLONASE) 50 mcg/act nasal spray          Health Maintenance Due   Topic Date Due    COVID-19 Vaccine (4 - Moderna series) 02/15/2022    Influenza Vaccine (1) Never done    Annual Physical  12/05/2023

## 2024-02-10 ENCOUNTER — DOCUMENTATION (OUTPATIENT)
Dept: FAMILY MEDICINE CLINIC | Facility: CLINIC | Age: 59
End: 2024-02-10

## 2024-02-10 DIAGNOSIS — R31.9 URINARY TRACT INFECTION WITH HEMATURIA, SITE UNSPECIFIED: Primary | ICD-10-CM

## 2024-02-10 DIAGNOSIS — N39.0 URINARY TRACT INFECTION WITH HEMATURIA, SITE UNSPECIFIED: Primary | ICD-10-CM

## 2024-02-10 RX ORDER — SULFAMETHOXAZOLE AND TRIMETHOPRIM 800; 160 MG/1; MG/1
1 TABLET ORAL EVERY 12 HOURS SCHEDULED
Qty: 14 TABLET | Refills: 0 | Status: SHIPPED | OUTPATIENT
Start: 2024-02-10 | End: 2024-02-17

## 2024-05-29 ENCOUNTER — DOCUMENTATION (OUTPATIENT)
Dept: GASTROENTEROLOGY | Facility: CLINIC | Age: 59
End: 2024-05-29

## 2024-05-30 ENCOUNTER — OFFICE VISIT (OUTPATIENT)
Dept: GASTROENTEROLOGY | Facility: CLINIC | Age: 59
End: 2024-05-30
Payer: COMMERCIAL

## 2024-05-30 VITALS
WEIGHT: 126 LBS | OXYGEN SATURATION: 98 % | HEIGHT: 61 IN | HEART RATE: 74 BPM | SYSTOLIC BLOOD PRESSURE: 106 MMHG | RESPIRATION RATE: 18 BRPM | DIASTOLIC BLOOD PRESSURE: 64 MMHG | BODY MASS INDEX: 23.79 KG/M2

## 2024-05-30 DIAGNOSIS — K21.00 GASTROESOPHAGEAL REFLUX DISEASE WITH ESOPHAGITIS WITHOUT HEMORRHAGE: Primary | ICD-10-CM

## 2024-05-30 DIAGNOSIS — Z86.010 HISTORY OF COLON POLYPS: ICD-10-CM

## 2024-05-30 DIAGNOSIS — R19.8 IRREGULAR BOWEL HABITS: ICD-10-CM

## 2024-05-30 DIAGNOSIS — A04.8 H. PYLORI INFECTION: ICD-10-CM

## 2024-05-30 PROCEDURE — 99214 OFFICE O/P EST MOD 30 MIN: CPT | Performed by: INTERNAL MEDICINE

## 2024-05-30 RX ORDER — FAMOTIDINE 40 MG/1
40 TABLET, FILM COATED ORAL
Qty: 90 TABLET | Refills: 3 | Status: SHIPPED | OUTPATIENT
Start: 2024-05-30

## 2024-05-30 NOTE — ASSESSMENT & PLAN NOTE
Cee was treated for H. pylori in May 2023 with quadruple therapy containing metronidazole, tetracycline, Pepto-Bismol, and omeprazole.  She did have a stool H. pylori antigen in July 2023 that was negative.  She does recall being treated for H. pylori about 10 years ago or so.  Looks like her H. pylori has been eradicated.  She should continue to monitor her symptoms and let us know if she has any recurrent dyspeptic symptoms.    I would recommend repeating an upper endoscopy in May 2026.    Patient would like to be retested for H. pylori therefore I will order an H. pylori stool antigen

## 2024-05-30 NOTE — LETTER
May 30, 2024     DARRIN Werner  [address]  [address]  [facility] [address]    Patient: Cee Nunez   YOB: 1965   Date of Visit: 5/30/2024       Dear Dr. Petersen:    Thank you for referring Cee Nunez to me for evaluation. Below are my notes for this consultation.    If you have questions, please do not hesitate to call me. I look forward to following your patient along with you.         Sincerely,        Lawrence Rivera DO        CC: No Recipients    Lawrence Rivera DO  5/30/2024  4:06 PM  Signed  [facility]  Gastroenterology Outpatient Follow up  Patient Cee Nunez   Age 59 y.o.   Gender female   MRN: [id number]  [facility] [id number]     ASSESSMENT AND PLAN:   Problem List Items Addressed This Visit          Digestive    Gastroesophageal reflux disease with esophagitis without hemorrhage - Primary     Cee does have a long history of GERD.  She has required proton pump inhibitor in the past.  She stopped using Nexium quite sometime ago.  She has been on famotidine 40 mg 2 hours before bed.  She would like to try to stop taking famotidine.  I suggested that maybe she could try taking famotidine every other day.  I also explained to her that she did have grade a erosive esophagitis and that we do know that she has GERD and she may be more prone to symptoms off medication.  I explained if she develops significant breakthrough symptoms with decreasing the famotidine, she should immediately resume it on a regular basis.  Lifestyle modifications for gastroesophageal reflux disease were discussed and include limiting fried and fatty foods, mints, chocolates, carbonated and caffeinated beverages , and alcohol, etc.  Avoid lying down for 2-3 hours after meals.  If you have nighttime symptoms consider raising the head of the bed up on 4-6 inch blocks.  Pillows typically are not useful.  If you are overweight, weight loss will be helpful.           Relevant  Medications    famotidine (PEPCID) 40 MG tablet       Other    H. pylori infection     Cee was treated for H. pylori in May 2023 with quadruple therapy containing metronidazole, tetracycline, Pepto-Bismol, and omeprazole.  She did have a stool H. pylori antigen in July 2023 that was negative.  She does recall being treated for H. pylori about 10 years ago or so.  Looks like her H. pylori has been eradicated.  She should continue to monitor her symptoms and let us know if she has any recurrent dyspeptic symptoms.    I would recommend repeating an upper endoscopy in May 2026.    Patient would like to be retested for H. pylori therefore I will order an H. pylori stool antigen           Relevant Orders    H. pylori antigen, stool    History of colon polyps     Cee was noted to have a 6 mm flat polyp in ascending colon, this was a serrated adenoma.  There is a 4 mm polyp at 45 cm that was a tubular adenoma.  Based upon these findings I recommend repeating colonoscopy around May 2028.           _____________________________________________________________    HPI:   Cee is a delightful 59-year-old woman who I am seeing in follow-up for gastroesophageal reflux disease, history of H. pylori and history of colon polyps.  She is very interested in stopping her famotidine.  She does report some very brief epigastric burning at times.  This is fleeting.  It may then lead to a hot flash.  She states she gets this symptom whether she takes the famotidine or not.  She has not been getting any heartburn or acid reflux whatsoever.  Denies any dysphagia nausea vomiting or early satiety.  There is been no unintentional weight loss.  She feels like she has been gaining weight.    Her bowel habits do tend towards constipation although she may have a week where her bowels are very regular and other times she may skip a couple days without a bowel movement.  She did use some fiber in the past which she did not feel was  helpful.  There is been no rectal bleeding or black stools.    Records reviewed for 15 minutes prior to office visit    12/4/2023 laboratory data  Sodium 137  Potassium 3.8  BUN 15  Creatinine 0.76  Glucose 114  AST 14  ALT 16  Alk phos 72  Albumin 4.6  Cholesterol 215  Triglycerides 58  HDL 83    WBC 9.19 Hgb 14.4 HCT 45.6 MCV 94 platelet count 306,000    7/28/2023 stool H. pylori antigen-negative     5/16/2023 colonoscopy  Normal terminal ileum  6 mm flat polyp proximal ascending colon status post cold snare polypectomy.  Sessile serrated adenoma.  4 mm flat polyp at 45 cm status post cold snare polypectomy.  Tubular adenoma  Very mild diverticulosis sigmoid colon  Small external hemorrhoids     5/16/2023 EGD  Normal duodenum to the second portion status post biopsy rule out celiac disease biopsies negative for celiac disease.  Mild antral erythema status post biopsy of the gastric body and antrum to rule out H. pylori.  Biopsy positive for H. pylori.  LA grade A esophagitis  Friability and fine nodularity at 38 cm GE junction status post biopsy.  Biopsy revealed chronic inflammation and reactive changes.  Negative for intestinal metaplasia.  Mild linear furrows in the distal and middle third of the esophagus status post biopsy.  Biopsies negative for EOE.     4/19/2023 laboratory data  WC 6.51 Hgb 14.8 HCT 45.4 MCV 94 platelet count 264,000        6/30/2020 ultrasound right upper quadrant-negative  10/3/2022 CBC and chemistry complete were okay     Patient does report having had an upper endoscopy many years ago.  She does report a history of H. pylori and was treated with antibiotics.  This was diagnosed at time of upper endoscopy    Allergies   Allergen Reactions   • Penicillins GI Intolerance     Current Outpatient Medications   Medication Sig Dispense Refill   • chlorpheniramine (CHLOR-TRIMETON) 4 MG tablet Take 4 mg by mouth daily at bedtime     • famotidine (PEPCID) 40 MG tablet Take 1 tablet (40 mg  "total) by mouth daily at bedtime Take in evening 2 hours prior to bed 90 tablet 3   • fluticasone (FLONASE) 50 mcg/act nasal spray 2 sprays into each nostril daily 18 mL 5   • urea (CARMOL) 40 % Apply topically daily       No current facility-administered medications for this visit.     MEDICAL HISTORY:  Past Medical History:   Diagnosis Date   • Allergic    • SVT (supraventricular tachycardia)      Past Surgical History:   Procedure Laterality Date   • CARDIAC ELECTROPHYSIOLOGY STUDY AND ABLATION     •  SECTION     • TUBAL LIGATION       Social History     Substance and Sexual Activity   Alcohol Use Yes   • Alcohol/week: 1.0 standard drink of alcohol   • Types: 1 Glasses of wine per week    Comment: occasional     Social History     Substance and Sexual Activity   Drug Use No     Social History     Tobacco Use   Smoking Status Never   Smokeless Tobacco Never     Family History   Problem Relation Age of Onset   • No Known Problems Mother    • No Known Problems Father    • No Known Problems Sister    • BRCA2 Negative Sister    • BRCA1 Negative Sister    • Breast cancer Sister 59   • No Known Problems Sister    • No Known Problems Sister    • No Known Problems Daughter    • No Known Problems Maternal Grandmother    • No Known Problems Paternal Grandmother    • No Known Problems Maternal Aunt    • No Known Problems Paternal Aunt            Objective  Blood pressure 106/64, pulse 74, resp. rate 18, height 5' 1\" (1.549 m), weight 57.2 kg (126 lb), last menstrual period 2021, SpO2 98%. Body mass index is 23.81 kg/m².    PHYSICAL EXAM:   General Appearance: Alert, cooperative, no distress  HEENT: Normocephalic, atraumatic, anicteric.   Neck: Supple, symmetrical, trachea midline  Lungs: Clear to auscultation bilaterally; no rales, rhonchi or wheezing; respirations unlabored   Heart: Regular rate and rhythm; no murmur, rub, or gallop.  Abdomen: Soft, bowel sounds normal, non-tender, non-distended; no masses, " there is no hepatosplenomegaly. No spider angiomas  Genitalia: Deferred   Rectal: Deferred   Extremities: No cyanosis, clubbing or edema   Skin: No jaundice, rashes, or lesions   Lymph nodes: No palpable cervical lymphadenopathy   Lab Results:   No visits with results within 2 Month(s) from this visit.   Latest known visit with results is:   Appointment on 12/04/2023   Component Date Value   • Cholesterol 12/04/2023 215 (H)    • Triglycerides 12/04/2023 58    • HDL, Direct 12/04/2023 83    • LDL Calculated 12/04/2023 120 (H)    • Non-HDL-Chol (CHOL-HDL) 12/04/2023 132    • WBC 12/04/2023 9.19    • RBC 12/04/2023 4.84    • Hemoglobin 12/04/2023 14.4    • Hematocrit 12/04/2023 45.6    • MCV 12/04/2023 94    • MCH 12/04/2023 29.8    • MCHC 12/04/2023 31.6    • RDW 12/04/2023 12.8    • MPV 12/04/2023 8.8 (L)    • Platelets 12/04/2023 306    • nRBC 12/04/2023 0    • Segmented % 12/04/2023 80 (H)    • Immature Grans % 12/04/2023 0    • Lymphocytes % 12/04/2023 15    • Monocytes % 12/04/2023 5    • Eosinophils Relative 12/04/2023 0    • Basophils Relative 12/04/2023 0    • Absolute Neutrophils 12/04/2023 7.29    • Absolute Immature Grans 12/04/2023 0.03    • Absolute Lymphocytes 12/04/2023 1.40    • Absolute Monocytes 12/04/2023 0.46    • Eosinophils Absolute 12/04/2023 0.00    • Basophils Absolute 12/04/2023 0.01    • Sodium 12/04/2023 137    • Potassium 12/04/2023 3.8    • Chloride 12/04/2023 101    • CO2 12/04/2023 28    • ANION GAP 12/04/2023 8    • BUN 12/04/2023 15    • Creatinine 12/04/2023 0.76    • Glucose, Fasting 12/04/2023 114 (H)    • Calcium 12/04/2023 9.3    • AST 12/04/2023 14    • ALT 12/04/2023 16    • Alkaline Phosphatase 12/04/2023 72    • Total Protein 12/04/2023 7.2    • Albumin 12/04/2023 4.6    • Total Bilirubin 12/04/2023 0.43    • eGFR 12/04/2023 86      Radiology Results:   No results found.  Lawrence Rivera DO   05/30/24   Cc:

## 2024-05-30 NOTE — PROGRESS NOTES
[facility]  Gastroenterology Outpatient Follow up  Patient Cee Nunez   Age 59 y.o.   Gender female   MRN: [id number]  [facility] [id number]     ASSESSMENT AND PLAN:   Problem List Items Addressed This Visit          Digestive    Gastroesophageal reflux disease with esophagitis without hemorrhage - Primary     Cee does have a long history of GERD.  She has required proton pump inhibitor in the past.  She stopped using Nexium quite sometime ago.  She has been on famotidine 40 mg 2 hours before bed.  She would like to try to stop taking famotidine.  I suggested that maybe she could try taking famotidine every other day.  I also explained to her that she did have grade a erosive esophagitis and that we do know that she has GERD and she may be more prone to symptoms off medication.  I explained if she develops significant breakthrough symptoms with decreasing the famotidine, she should immediately resume it on a regular basis.  Lifestyle modifications for gastroesophageal reflux disease were discussed and include limiting fried and fatty foods, mints, chocolates, carbonated and caffeinated beverages , and alcohol, etc.  Avoid lying down for 2-3 hours after meals.  If you have nighttime symptoms consider raising the head of the bed up on 4-6 inch blocks.  Pillows typically are not useful.  If you are overweight, weight loss will be helpful.           Relevant Medications    famotidine (PEPCID) 40 MG tablet       Other    H. pylori infection     Cee was treated for H. pylori in May 2023 with quadruple therapy containing metronidazole, tetracycline, Pepto-Bismol, and omeprazole.  She did have a stool H. pylori antigen in July 2023 that was negative.  She does recall being treated for H. pylori about 10 years ago or so.  Looks like her H. pylori has been eradicated.  She should continue to monitor her symptoms and let us know if she has any recurrent dyspeptic symptoms.    I would recommend repeating an  upper endoscopy in May 2026.    Patient would like to be retested for H. pylori therefore I will order an H. pylori stool antigen           Relevant Orders    H. pylori antigen, stool    History of colon polyps     Cee was noted to have a 6 mm flat polyp in ascending colon, this was a serrated adenoma.  There is a 4 mm polyp at 45 cm that was a tubular adenoma.  Based upon these findings I recommend repeating colonoscopy around May 2028.         Irregular bowel habits     Cee does report fairly regular bowel movements for the most part however she may be more prone to constipation at times.  I did encourage her to use fiber supplement on a daily basis such as Benefiber 2 teaspoonfuls mixed in 6 to 8 ounce of noncovered liquid daily.  If she is troubled with a lot of constipation she can always try low-dose MiraLAX such as one half dose to 1 full dose daily or every other day as needed.  Alternatively she could try magnesium supplement or milk of magnesia which can help with bowel movements as well.  We did talk about the use of prunes and prune juice which can be an natural laxative.           _____________________________________________________________    HPI:   Cee is a delightful 59-year-old woman who I am seeing in follow-up for gastroesophageal reflux disease, history of H. pylori and history of colon polyps.  She is very interested in stopping her famotidine.  She does report some very brief epigastric burning at times.  This is fleeting.  It may then lead to a hot flash.  She states she gets this symptom whether she takes the famotidine or not.  She has not been getting any heartburn or acid reflux whatsoever.  Denies any dysphagia nausea vomiting or early satiety.  There is been no unintentional weight loss.  She feels like she has been gaining weight.    Her bowel habits do tend towards constipation although she may have a week where her bowels are very regular and other times she may skip a  couple days without a bowel movement.  She did use some fiber in the past which she did not feel was helpful.  There is been no rectal bleeding or black stools.    Records reviewed for 15 minutes prior to office visit    12/4/2023 laboratory data  Sodium 137  Potassium 3.8  BUN 15  Creatinine 0.76  Glucose 114  AST 14  ALT 16  Alk phos 72  Albumin 4.6  Cholesterol 215  Triglycerides 58  HDL 83    WBC 9.19 Hgb 14.4 HCT 45.6 MCV 94 platelet count 306,000    7/28/2023 stool H. pylori antigen-negative     5/16/2023 colonoscopy  Normal terminal ileum  6 mm flat polyp proximal ascending colon status post cold snare polypectomy.  Sessile serrated adenoma.  4 mm flat polyp at 45 cm status post cold snare polypectomy.  Tubular adenoma  Very mild diverticulosis sigmoid colon  Small external hemorrhoids     5/16/2023 EGD  Normal duodenum to the second portion status post biopsy rule out celiac disease biopsies negative for celiac disease.  Mild antral erythema status post biopsy of the gastric body and antrum to rule out H. pylori.  Biopsy positive for H. pylori.  LA grade A esophagitis  Friability and fine nodularity at 38 cm GE junction status post biopsy.  Biopsy revealed chronic inflammation and reactive changes.  Negative for intestinal metaplasia.  Mild linear furrows in the distal and middle third of the esophagus status post biopsy.  Biopsies negative for EOE.     4/19/2023 laboratory data  WC 6.51 Hgb 14.8 HCT 45.4 MCV 94 platelet count 264,000        6/30/2020 ultrasound right upper quadrant-negative  10/3/2022 CBC and chemistry complete were okay     Patient does report having had an upper endoscopy many years ago.  She does report a history of H. pylori and was treated with antibiotics.  This was diagnosed at time of upper endoscopy    Allergies   Allergen Reactions    Penicillins GI Intolerance     Current Outpatient Medications   Medication Sig Dispense Refill    chlorpheniramine (CHLOR-TRIMETON) 4 MG  "tablet Take 4 mg by mouth daily at bedtime      famotidine (PEPCID) 40 MG tablet Take 1 tablet (40 mg total) by mouth daily at bedtime Take in evening 2 hours prior to bed 90 tablet 3    fluticasone (FLONASE) 50 mcg/act nasal spray 2 sprays into each nostril daily 18 mL 5    urea (CARMOL) 40 % Apply topically daily       No current facility-administered medications for this visit.     MEDICAL HISTORY:  Past Medical History:   Diagnosis Date    Allergic     SVT (supraventricular tachycardia)      Past Surgical History:   Procedure Laterality Date    CARDIAC ELECTROPHYSIOLOGY STUDY AND ABLATION       SECTION      TUBAL LIGATION       Social History     Substance and Sexual Activity   Alcohol Use Yes    Alcohol/week: 1.0 standard drink of alcohol    Types: 1 Glasses of wine per week    Comment: occasional     Social History     Substance and Sexual Activity   Drug Use No     Social History     Tobacco Use   Smoking Status Never   Smokeless Tobacco Never     Family History   Problem Relation Age of Onset    No Known Problems Mother     No Known Problems Father     No Known Problems Sister     BRCA2 Negative Sister     BRCA1 Negative Sister     Breast cancer Sister 59    No Known Problems Sister     No Known Problems Sister     No Known Problems Daughter     No Known Problems Maternal Grandmother     No Known Problems Paternal Grandmother     No Known Problems Maternal Aunt     No Known Problems Paternal Aunt            Objective   Blood pressure 106/64, pulse 74, resp. rate 18, height 5' 1\" (1.549 m), weight 57.2 kg (126 lb), last menstrual period 2021, SpO2 98%. Body mass index is 23.81 kg/m².    PHYSICAL EXAM:   General Appearance: Alert, cooperative, no distress  HEENT: Normocephalic, atraumatic, anicteric.   Neck: Supple, symmetrical, trachea midline  Lungs: Clear to auscultation bilaterally; no rales, rhonchi or wheezing; respirations unlabored   Heart: Regular rate and rhythm; no murmur, rub, or " gallop.  Abdomen: Soft, bowel sounds normal, non-tender, non-distended; no masses, there is no hepatosplenomegaly. No spider angiomas  Genitalia: Deferred   Rectal: Deferred   Extremities: No cyanosis, clubbing or edema   Skin: No jaundice, rashes, or lesions   Lymph nodes: No palpable cervical lymphadenopathy   Lab Results:   No visits with results within 2 Month(s) from this visit.   Latest known visit with results is:   Appointment on 12/04/2023   Component Date Value    Cholesterol 12/04/2023 215 (H)     Triglycerides 12/04/2023 58     HDL, Direct 12/04/2023 83     LDL Calculated 12/04/2023 120 (H)     Non-HDL-Chol (CHOL-HDL) 12/04/2023 132     WBC 12/04/2023 9.19     RBC 12/04/2023 4.84     Hemoglobin 12/04/2023 14.4     Hematocrit 12/04/2023 45.6     MCV 12/04/2023 94     MCH 12/04/2023 29.8     MCHC 12/04/2023 31.6     RDW 12/04/2023 12.8     MPV 12/04/2023 8.8 (L)     Platelets 12/04/2023 306     nRBC 12/04/2023 0     Segmented % 12/04/2023 80 (H)     Immature Grans % 12/04/2023 0     Lymphocytes % 12/04/2023 15     Monocytes % 12/04/2023 5     Eosinophils Relative 12/04/2023 0     Basophils Relative 12/04/2023 0     Absolute Neutrophils 12/04/2023 7.29     Absolute Immature Grans 12/04/2023 0.03     Absolute Lymphocytes 12/04/2023 1.40     Absolute Monocytes 12/04/2023 0.46     Eosinophils Absolute 12/04/2023 0.00     Basophils Absolute 12/04/2023 0.01     Sodium 12/04/2023 137     Potassium 12/04/2023 3.8     Chloride 12/04/2023 101     CO2 12/04/2023 28     ANION GAP 12/04/2023 8     BUN 12/04/2023 15     Creatinine 12/04/2023 0.76     Glucose, Fasting 12/04/2023 114 (H)     Calcium 12/04/2023 9.3     AST 12/04/2023 14     ALT 12/04/2023 16     Alkaline Phosphatase 12/04/2023 72     Total Protein 12/04/2023 7.2     Albumin 12/04/2023 4.6     Total Bilirubin 12/04/2023 0.43     eGFR 12/04/2023 86      Radiology Results:   No results found.  Lawrence Rivera DO   05/30/24   Cc:

## 2024-05-30 NOTE — ASSESSMENT & PLAN NOTE
Cee was noted to have a 6 mm flat polyp in ascending colon, this was a serrated adenoma.  There is a 4 mm polyp at 45 cm that was a tubular adenoma.  Based upon these findings I recommend repeating colonoscopy around May 2028.

## 2024-05-30 NOTE — ASSESSMENT & PLAN NOTE
Cee does have a long history of GERD.  She has required proton pump inhibitor in the past.  She stopped using Nexium quite sometime ago.  She has been on famotidine 40 mg 2 hours before bed.  She would like to try to stop taking famotidine.  I suggested that maybe she could try taking famotidine every other day.  I also explained to her that she did have grade a erosive esophagitis and that we do know that she has GERD and she may be more prone to symptoms off medication.  I explained if she develops significant breakthrough symptoms with decreasing the famotidine, she should immediately resume it on a regular basis.  Lifestyle modifications for gastroesophageal reflux disease were discussed and include limiting fried and fatty foods, mints, chocolates, carbonated and caffeinated beverages , and alcohol, etc.  Avoid lying down for 2-3 hours after meals.  If you have nighttime symptoms consider raising the head of the bed up on 4-6 inch blocks.  Pillows typically are not useful.  If you are overweight, weight loss will be helpful.

## 2024-05-30 NOTE — ASSESSMENT & PLAN NOTE
Cee does report fairly regular bowel movements for the most part however she may be more prone to constipation at times.  I did encourage her to use fiber supplement on a daily basis such as Benefiber 2 teaspoonfuls mixed in 6 to 8 ounce of noncovered liquid daily.  If she is troubled with a lot of constipation she can always try low-dose MiraLAX such as one half dose to 1 full dose daily or every other day as needed.  Alternatively she could try magnesium supplement or milk of magnesia which can help with bowel movements as well.  We did talk about the use of prunes and prune juice which can be an natural laxative.

## 2024-05-30 NOTE — PROGRESS NOTES
Records reviewed and outlined below in preparation for office visit 5/30/2024;    12/4/2023 laboratory data  Sodium 137  Potassium 3.8  BUN 15  Creatinine 0.76  Glucose 114  AST 14  ALT 16  Alk phos 72  Albumin 4.6  Cholesterol 215  Triglycerides 58  HDL 83    WBC 9.19 Hgb 14.4 HCT 45.6 MCV 94 platelet count 306,000    7/28/2023 stool H. pylori antigen-negative     5/16/2023 colonoscopy  Normal terminal ileum  6 mm flat polyp proximal ascending colon status post cold snare polypectomy.  Sessile serrated adenoma.  4 mm flat polyp at 45 cm status post cold snare polypectomy.  Tubular adenoma  Very mild diverticulosis sigmoid colon  Small external hemorrhoids     5/16/2023 EGD  Normal duodenum to the second portion status post biopsy rule out celiac disease biopsies negative for celiac disease.  Mild antral erythema status post biopsy of the gastric body and antrum to rule out H. pylori.  Biopsy positive for H. pylori.  LA grade A esophagitis  Friability and fine nodularity at 38 cm GE junction status post biopsy.  Biopsy revealed chronic inflammation and reactive changes.  Negative for intestinal metaplasia.  Mild linear furrows in the distal and middle third of the esophagus status post biopsy.  Biopsies negative for EOE.     4/19/2023 laboratory data  WC 6.51 Hgb 14.8 HCT 45.4 MCV 94 platelet count 264,000        6/30/2020 ultrasound right upper quadrant-negative  10/3/2022 CBC and chemistry complete were okay     Patient does report having had an upper endoscopy many years ago.  She does report a history of H. pylori and was treated with antibiotics.  This was diagnosed at time of upper endoscopy

## 2024-05-30 NOTE — PATIENT INSTRUCTIONS
Gastroesophageal reflux disease with esophagitis without hemorrhage  Cee does have a long history of GERD.  She has required proton pump inhibitor in the past.  She stopped using Nexium quite sometime ago.  She has been on famotidine 40 mg 2 hours before bed.  She would like to try to stop taking famotidine.  I suggested that maybe she could try taking famotidine every other day.  I also explained to her that she did have grade a erosive esophagitis and that we do know that she has GERD and she may be more prone to symptoms off medication.  I explained if she develops significant breakthrough symptoms with decreasing the famotidine, she should immediately resume it on a regular basis.  Lifestyle modifications for gastroesophageal reflux disease were discussed and include limiting fried and fatty foods, mints, chocolates, carbonated and caffeinated beverages , and alcohol, etc.  Avoid lying down for 2-3 hours after meals.  If you have nighttime symptoms consider raising the head of the bed up on 4-6 inch blocks.  Pillows typically are not useful.  If you are overweight, weight loss will be helpful.      H. pylori infection  Cee was treated for H. pylori in May 2023 with quadruple therapy containing metronidazole, tetracycline, Pepto-Bismol, and omeprazole.  She did have a stool H. pylori antigen in July 2023 that was negative.  She does recall being treated for H. pylori about 10 years ago or so.  Looks like her H. pylori has been eradicated.  She should continue to monitor her symptoms and let us know if she has any recurrent dyspeptic symptoms.    I would recommend repeating an upper endoscopy in May 2026.    Patient would like to be retested for H. pylori therefore I will order an H. pylori stool antigen      History of colon polyps  Cee was noted to have a 6 mm flat polyp in ascending colon, this was a serrated adenoma.  There is a 4 mm polyp at 45 cm that was a tubular adenoma.  Based upon these  findings I recommend repeating colonoscopy around May 2028.    Irregular bowel habits  Cee does report fairly regular bowel movements for the most part however she may be more prone to constipation at times.  I did encourage her to use fiber supplement on a daily basis such as Benefiber 2 teaspoonfuls mixed in 6 to 8 ounce of noncovered liquid daily.  If she is troubled with a lot of constipation she can always try low-dose MiraLAX such as one half dose to 1 full dose daily or every other day as needed.  Alternatively she could try magnesium supplement or milk of magnesia which can help with bowel movements as well.  We did talk about the use of prunes and prune juice which can be an natural laxative.

## 2024-06-07 ENCOUNTER — LAB (OUTPATIENT)
Dept: LAB | Facility: HOSPITAL | Age: 59
End: 2024-06-07
Payer: COMMERCIAL

## 2024-06-07 DIAGNOSIS — A04.8 H. PYLORI INFECTION: ICD-10-CM

## 2024-06-07 PROCEDURE — 87338 HPYLORI STOOL AG IA: CPT

## 2024-06-08 LAB — H PYLORI AG STL QL IA: NEGATIVE

## 2024-06-10 ENCOUNTER — ANNUAL EXAM (OUTPATIENT)
Dept: OBGYN CLINIC | Facility: CLINIC | Age: 59
End: 2024-06-10
Payer: COMMERCIAL

## 2024-06-10 VITALS
SYSTOLIC BLOOD PRESSURE: 110 MMHG | BODY MASS INDEX: 23.98 KG/M2 | WEIGHT: 127 LBS | DIASTOLIC BLOOD PRESSURE: 70 MMHG | HEIGHT: 61 IN

## 2024-06-10 DIAGNOSIS — Z12.31 ENCOUNTER FOR SCREENING MAMMOGRAM FOR MALIGNANT NEOPLASM OF BREAST: ICD-10-CM

## 2024-06-10 DIAGNOSIS — Z01.419 ENCOUNTER FOR GYNECOLOGICAL EXAMINATION: Primary | ICD-10-CM

## 2024-06-10 PROCEDURE — S0612 ANNUAL GYNECOLOGICAL EXAMINA: HCPCS | Performed by: STUDENT IN AN ORGANIZED HEALTH CARE EDUCATION/TRAINING PROGRAM

## 2024-06-18 ENCOUNTER — APPOINTMENT (OUTPATIENT)
Dept: LAB | Facility: HOSPITAL | Age: 59
End: 2024-06-18
Payer: COMMERCIAL

## 2024-06-18 DIAGNOSIS — R73.01 IMPAIRED FASTING GLUCOSE: ICD-10-CM

## 2024-06-18 DIAGNOSIS — Z13.220 SCREENING CHOLESTEROL LEVEL: ICD-10-CM

## 2024-06-18 DIAGNOSIS — Z00.00 ANNUAL PHYSICAL EXAM: ICD-10-CM

## 2024-06-18 LAB
ALBUMIN SERPL BCP-MCNC: 4.4 G/DL (ref 3.5–5)
ALP SERPL-CCNC: 59 U/L (ref 34–104)
ALT SERPL W P-5'-P-CCNC: 12 U/L (ref 7–52)
ANION GAP SERPL CALCULATED.3IONS-SCNC: 8 MMOL/L (ref 4–13)
AST SERPL W P-5'-P-CCNC: 14 U/L (ref 13–39)
BASOPHILS # BLD AUTO: 0.05 THOUSANDS/ÂΜL (ref 0–0.1)
BASOPHILS NFR BLD AUTO: 1 % (ref 0–1)
BILIRUB SERPL-MCNC: 0.51 MG/DL (ref 0.2–1)
BUN SERPL-MCNC: 23 MG/DL (ref 5–25)
CALCIUM SERPL-MCNC: 9.4 MG/DL (ref 8.4–10.2)
CHLORIDE SERPL-SCNC: 104 MMOL/L (ref 96–108)
CHOLEST SERPL-MCNC: 170 MG/DL
CO2 SERPL-SCNC: 26 MMOL/L (ref 21–32)
CREAT SERPL-MCNC: 0.82 MG/DL (ref 0.6–1.3)
EOSINOPHIL # BLD AUTO: 0.16 THOUSAND/ÂΜL (ref 0–0.61)
EOSINOPHIL NFR BLD AUTO: 3 % (ref 0–6)
ERYTHROCYTE [DISTWIDTH] IN BLOOD BY AUTOMATED COUNT: 12.3 % (ref 11.6–15.1)
EST. AVERAGE GLUCOSE BLD GHB EST-MCNC: 103 MG/DL
GFR SERPL CREATININE-BSD FRML MDRD: 78 ML/MIN/1.73SQ M
GLUCOSE P FAST SERPL-MCNC: 82 MG/DL (ref 65–99)
HBA1C MFR BLD: 5.2 %
HCT VFR BLD AUTO: 43.5 % (ref 34.8–46.1)
HDLC SERPL-MCNC: 76 MG/DL
HGB BLD-MCNC: 14.5 G/DL (ref 11.5–15.4)
IMM GRANULOCYTES # BLD AUTO: 0.01 THOUSAND/UL (ref 0–0.2)
IMM GRANULOCYTES NFR BLD AUTO: 0 % (ref 0–2)
LDLC SERPL CALC-MCNC: 86 MG/DL (ref 0–100)
LYMPHOCYTES # BLD AUTO: 1.93 THOUSANDS/ÂΜL (ref 0.6–4.47)
LYMPHOCYTES NFR BLD AUTO: 39 % (ref 14–44)
MCH RBC QN AUTO: 31 PG (ref 26.8–34.3)
MCHC RBC AUTO-ENTMCNC: 33.3 G/DL (ref 31.4–37.4)
MCV RBC AUTO: 93 FL (ref 82–98)
MONOCYTES # BLD AUTO: 0.44 THOUSAND/ÂΜL (ref 0.17–1.22)
MONOCYTES NFR BLD AUTO: 9 % (ref 4–12)
NEUTROPHILS # BLD AUTO: 2.31 THOUSANDS/ÂΜL (ref 1.85–7.62)
NEUTS SEG NFR BLD AUTO: 48 % (ref 43–75)
NONHDLC SERPL-MCNC: 94 MG/DL
NRBC BLD AUTO-RTO: 0 /100 WBCS
PLATELET # BLD AUTO: 249 THOUSANDS/UL (ref 149–390)
PMV BLD AUTO: 9.4 FL (ref 8.9–12.7)
POTASSIUM SERPL-SCNC: 3.7 MMOL/L (ref 3.5–5.3)
PROT SERPL-MCNC: 7 G/DL (ref 6.4–8.4)
RBC # BLD AUTO: 4.67 MILLION/UL (ref 3.81–5.12)
SODIUM SERPL-SCNC: 138 MMOL/L (ref 135–147)
TRIGL SERPL-MCNC: 38 MG/DL
WBC # BLD AUTO: 4.9 THOUSAND/UL (ref 4.31–10.16)

## 2024-06-18 PROCEDURE — 80053 COMPREHEN METABOLIC PANEL: CPT

## 2024-06-18 PROCEDURE — 36415 COLL VENOUS BLD VENIPUNCTURE: CPT

## 2024-06-18 PROCEDURE — 85025 COMPLETE CBC W/AUTO DIFF WBC: CPT

## 2024-06-18 PROCEDURE — 83036 HEMOGLOBIN GLYCOSYLATED A1C: CPT

## 2024-06-18 PROCEDURE — 80061 LIPID PANEL: CPT

## 2024-06-20 ENCOUNTER — OFFICE VISIT (OUTPATIENT)
Dept: FAMILY MEDICINE CLINIC | Facility: CLINIC | Age: 59
End: 2024-06-20
Payer: COMMERCIAL

## 2024-06-20 VITALS
HEART RATE: 70 BPM | HEIGHT: 61 IN | WEIGHT: 124 LBS | TEMPERATURE: 97.9 F | OXYGEN SATURATION: 99 % | RESPIRATION RATE: 18 BRPM | BODY MASS INDEX: 23.41 KG/M2 | DIASTOLIC BLOOD PRESSURE: 72 MMHG | SYSTOLIC BLOOD PRESSURE: 106 MMHG

## 2024-06-20 DIAGNOSIS — Z13.220 SCREENING CHOLESTEROL LEVEL: ICD-10-CM

## 2024-06-20 DIAGNOSIS — R73.01 IMPAIRED FASTING GLUCOSE: ICD-10-CM

## 2024-06-20 DIAGNOSIS — L50.9 URTICARIA: Primary | ICD-10-CM

## 2024-06-20 PROCEDURE — 99213 OFFICE O/P EST LOW 20 MIN: CPT | Performed by: NURSE PRACTITIONER

## 2024-06-20 NOTE — PROGRESS NOTES
Ambulatory Visit  Name: Cee Nunez      : 1965      MRN: [id number]  Encounter Provider: DARRIN Werner  Encounter Date: 2024   Encounter department: [facility]    Assessment & Plan   1. Urticaria  -     Ambulatory Referral to Dermatology; Future  2. Screening cholesterol level  -     Lipid panel; Future  3. Impaired fasting glucose  -     Comprehensive metabolic panel; Future  -     CBC and differential; Future         History of Present Illness     Here for 6 month medcheck and lab review- her bloodwork results have significantly improved- all questions answered  She reports her shins (bilateral) get very itchy, usually at night but also during the day. She uses Aveeno lotion. Is agreeable to see Dermatology      Review of Systems   Constitutional:  Negative for activity change, diaphoresis, fatigue and fever.   HENT:  Negative for congestion, facial swelling, hearing loss, rhinorrhea, sinus pressure, sinus pain, sneezing, sore throat and voice change.    Eyes:  Negative for discharge and visual disturbance.   Respiratory:  Negative for cough, choking, chest tightness, shortness of breath, wheezing and stridor.    Cardiovascular:  Negative for chest pain, palpitations and leg swelling.   Gastrointestinal:  Negative for abdominal distention, abdominal pain, constipation, diarrhea, nausea and vomiting.   Endocrine: Negative for polydipsia, polyphagia and polyuria.   Genitourinary:  Negative for difficulty urinating, dysuria, frequency and urgency.   Musculoskeletal:  Negative for arthralgias, back pain, gait problem, joint swelling, myalgias, neck pain and neck stiffness.   Skin:  Negative for color change, rash and wound.   Neurological:  Negative for dizziness, syncope, speech difficulty, weakness, light-headedness and headaches.   Hematological:  Negative for adenopathy. Does not bruise/bleed easily.   Psychiatric/Behavioral:  Negative for agitation, behavioral  problems, confusion, hallucinations, sleep disturbance and suicidal ideas. The patient is not nervous/anxious.      Past Medical History:   Diagnosis Date    Allergic     SVT (supraventricular tachycardia)      Past Surgical History:   Procedure Laterality Date    CARDIAC ELECTROPHYSIOLOGY STUDY AND ABLATION       SECTION      TUBAL LIGATION       Family History   Problem Relation Age of Onset    No Known Problems Mother     No Known Problems Father     No Known Problems Sister     BRCA2 Negative Sister     BRCA1 Negative Sister     Breast cancer Sister 59    No Known Problems Sister     No Known Problems Sister     No Known Problems Daughter     No Known Problems Maternal Grandmother     No Known Problems Paternal Grandmother     No Known Problems Maternal Aunt     No Known Problems Paternal Aunt      Social History     Tobacco Use    Smoking status: Never    Smokeless tobacco: Never   Vaping Use    Vaping status: Never Used   Substance and Sexual Activity    Alcohol use: Yes     Alcohol/week: 1.0 standard drink of alcohol     Types: 1 Glasses of wine per week     Comment: occasional    Drug use: No    Sexual activity: Yes     Birth control/protection: Post-menopausal, Female Sterilization     Comment: Tubal     Current Outpatient Medications on File Prior to Visit   Medication Sig    chlorpheniramine (CHLOR-TRIMETON) 4 MG tablet Take 4 mg by mouth daily at bedtime    famotidine (PEPCID) 40 MG tablet Take 1 tablet (40 mg total) by mouth daily at bedtime Take in evening 2 hours prior to bed    fluticasone (FLONASE) 50 mcg/act nasal spray 2 sprays into each nostril daily (Patient taking differently: 2 sprays into each nostril if needed)    urea (CARMOL) 40 % Apply topically daily     Allergies   Allergen Reactions    Penicillins GI Intolerance     Immunization History   Administered Date(s) Administered    COVID-19 MODERNA VACC 0.25 ML IM BOOSTER 2021    COVID-19 MODERNA VACC 0.5 ML IM 2020,  "01/19/2021    Tdap 06/22/2020     Objective     /72   Pulse 70   Temp 97.9 °F (36.6 °C)   Resp 18   Ht 5' 1\" (1.549 m)   Wt 56.2 kg (124 lb)   LMP 01/01/2021 (Approximate)   SpO2 99%   BMI 23.43 kg/m²     Physical Exam  Vitals and nursing note reviewed.   Constitutional:       General: She is not in acute distress.     Appearance: She is well-developed. She is not diaphoretic.   HENT:      Head: Normocephalic and atraumatic.      Right Ear: Tympanic membrane, ear canal and external ear normal.      Left Ear: Tympanic membrane, ear canal and external ear normal.      Nose: Nose normal.      Mouth/Throat:      Mouth: Mucous membranes are moist.      Pharynx: Oropharynx is clear. Uvula midline.   Eyes:      General:         Right eye: No discharge.         Left eye: No discharge.      Conjunctiva/sclera: Conjunctivae normal.   Neck:      Thyroid: No thyromegaly.   Cardiovascular:      Rate and Rhythm: Normal rate and regular rhythm.      Heart sounds: Normal heart sounds. No murmur heard.     No friction rub. No gallop.   Pulmonary:      Effort: Pulmonary effort is normal. No respiratory distress.      Breath sounds: Normal breath sounds. No wheezing or rales.   Musculoskeletal:         General: No tenderness or deformity. Normal range of motion.      Cervical back: Normal range of motion and neck supple.   Skin:     General: Skin is warm and dry.      Findings: No erythema or rash (no rash noted on bilateral lower legs).   Neurological:      Mental Status: She is alert and oriented to person, place, and time.      Cranial Nerves: No cranial nerve deficit.      Coordination: Coordination normal.   Psychiatric:         Mood and Affect: Mood normal.         Behavior: Behavior normal.         Thought Content: Thought content normal.         Judgment: Judgment normal.       Administrative Statements     " 37.2

## 2024-06-21 NOTE — PROGRESS NOTES
[facility] [address], [place], [place]    ASSESSMENT/PLAN: Cee Nunez is a 59 y.o.  who presents for annual gynecologic exam.    Encounter for routine gynecologic examination  - Routine well woman exam completed today.  - Cervical Cancer Screening: Current ASCCP Guidelines reviewed. Last Pap: 2022. Next Pap Due: .  - HPV Vaccination status: Immunization series complete  - STI screening offered including HIV: Declines  - Breast Cancer Screening: Last Mammogram 2023,   - Colorectal cancer screening was not ordered.  - The following were reviewed in today's visit: breast self exam    Additional problems addressed at this visit:  1. Encounter for gynecological examination  2. Encounter for screening mammogram for malignant neoplasm of breast  -     Mammo screening bilateral w 3d & cad; Future        CC:  Annual Gynecologic Examination    HPI: Cee Nunez is a 59 y.o.  who presents for annual gynecologic examination.  She reports  no new changes to her health.  She reports no breast concerns. She has no vaginal discharge, vulvar or vaginal lesions, pelvic pain, or abnormal bleeding.  She has no sexual health concerns and is currently sexually active with one male partner.  She has no symptoms of pelvic organ prolapse, urinary, or fecal incontinence.  She denies intimate partner violence.            The following portions of the patient's history were reviewed and updated as appropriate: allergies, current medications, past family history, past medical history, obstetric history, gynecologic history, past social history, past surgical history and problem list.    Review of Systems   Constitutional:  Negative for chills and fever.   Respiratory:  Negative for cough and shortness of breath.    Cardiovascular:  Negative for chest pain and leg swelling.   Gastrointestinal:  Negative for abdominal pain, nausea and vomiting.   Genitourinary:  Negative  "for dysuria, frequency and urgency.   Neurological:  Negative for dizziness, light-headedness and headaches.         Objective:  /70   Ht 5' 1\" (1.549 m)   Wt 57.6 kg (127 lb)   LMP 01/01/2021 (Approximate)   BMI 24.00 kg/m²    Physical Exam  Chaperone present: chaparone offered, patient declined.   Constitutional:       Appearance: Normal appearance.   HENT:      Head: Normocephalic and atraumatic.   Cardiovascular:      Rate and Rhythm: Normal rate.   Pulmonary:      Effort: Pulmonary effort is normal.   Chest:   Breasts:     Breasts are symmetrical.      Right: Normal. No swelling, bleeding, inverted nipple, mass, nipple discharge, skin change or tenderness.      Left: Normal. No swelling, bleeding, inverted nipple, mass, nipple discharge, skin change or tenderness.   Abdominal:      General: There is no distension.      Tenderness: There is no abdominal tenderness. There is no guarding.   Genitourinary:     Exam position: Lithotomy position.      Pubic Area: No rash.       Labia:         Right: No rash, tenderness or lesion.         Left: No rash, tenderness or lesion.       Urethra: No prolapse, urethral swelling or urethral lesion.      Vagina: Normal. No vaginal discharge, erythema, tenderness, bleeding or lesions.      Cervix: No cervical motion tenderness, discharge, friability or erythema.      Uterus: Not enlarged, not tender and no uterine prolapse.       Adnexa:         Right: No mass, tenderness or fullness.          Left: No mass, tenderness or fullness.     Lymphadenopathy:      Upper Body:      Right upper body: No axillary adenopathy.      Left upper body: No axillary adenopathy.      Lower Body: No right inguinal adenopathy. No left inguinal adenopathy.   Neurological:      Mental Status: She is alert.             Lizzeth Keita MD  [facility]  06/21/24 10:29 AM  "

## 2024-07-12 ENCOUNTER — HOSPITAL ENCOUNTER (OUTPATIENT)
Dept: MAMMOGRAPHY | Facility: HOSPITAL | Age: 59
End: 2024-07-12
Attending: STUDENT IN AN ORGANIZED HEALTH CARE EDUCATION/TRAINING PROGRAM
Payer: COMMERCIAL

## 2024-07-12 DIAGNOSIS — Z12.31 ENCOUNTER FOR SCREENING MAMMOGRAM FOR MALIGNANT NEOPLASM OF BREAST: ICD-10-CM

## 2024-07-12 PROCEDURE — 77067 SCR MAMMO BI INCL CAD: CPT

## 2024-07-12 PROCEDURE — 77063 BREAST TOMOSYNTHESIS BI: CPT

## 2024-08-30 ENCOUNTER — TELEPHONE (OUTPATIENT)
Dept: ENDOCRINOLOGY | Facility: CLINIC | Age: 59
End: 2024-08-30

## 2024-08-30 DIAGNOSIS — K21.00 GASTROESOPHAGEAL REFLUX DISEASE WITH ESOPHAGITIS WITHOUT HEMORRHAGE: ICD-10-CM

## 2024-08-30 RX ORDER — FAMOTIDINE 40 MG/1
40 TABLET, FILM COATED ORAL
Qty: 90 TABLET | Refills: 3 | Status: SHIPPED | OUTPATIENT
Start: 2024-08-30

## 2024-08-30 NOTE — TELEPHONE ENCOUNTER
Patient is requesting pepcid to be sent over to a different pharmacy. Patient would like prescription sent over to [facility] pharmacy at [facility] [facility] [address] [place] [address] fax [phone] and phone number [phone]

## 2024-11-25 ENCOUNTER — APPOINTMENT (OUTPATIENT)
Dept: LAB | Facility: HOSPITAL | Age: 59
End: 2024-11-25
Payer: COMMERCIAL

## 2024-11-25 DIAGNOSIS — R73.01 IMPAIRED FASTING GLUCOSE: ICD-10-CM

## 2024-11-25 DIAGNOSIS — Z13.220 SCREENING CHOLESTEROL LEVEL: ICD-10-CM

## 2024-11-25 LAB
ALBUMIN SERPL BCG-MCNC: 4.6 G/DL (ref 3.5–5)
ALP SERPL-CCNC: 59 U/L (ref 34–104)
ALT SERPL W P-5'-P-CCNC: 18 U/L (ref 7–52)
ANION GAP SERPL CALCULATED.3IONS-SCNC: 8 MMOL/L (ref 4–13)
AST SERPL W P-5'-P-CCNC: 17 U/L (ref 13–39)
BASOPHILS # BLD MANUAL: 0.08 THOUSAND/UL (ref 0–0.1)
BASOPHILS NFR MAR MANUAL: 1 % (ref 0–1)
BILIRUB SERPL-MCNC: 0.47 MG/DL (ref 0.2–1)
BUN SERPL-MCNC: 19 MG/DL (ref 5–25)
CALCIUM SERPL-MCNC: 9.3 MG/DL (ref 8.4–10.2)
CHLORIDE SERPL-SCNC: 102 MMOL/L (ref 96–108)
CHOLEST SERPL-MCNC: 177 MG/DL (ref ?–200)
CO2 SERPL-SCNC: 29 MMOL/L (ref 21–32)
CREAT SERPL-MCNC: 0.86 MG/DL (ref 0.6–1.3)
EOSINOPHIL # BLD MANUAL: 1.89 THOUSAND/UL (ref 0–0.4)
EOSINOPHIL NFR BLD MANUAL: 25 % (ref 0–6)
ERYTHROCYTE [DISTWIDTH] IN BLOOD BY AUTOMATED COUNT: 12.4 % (ref 11.6–15.1)
GFR SERPL CREATININE-BSD FRML MDRD: 74 ML/MIN/1.73SQ M
GLUCOSE P FAST SERPL-MCNC: 95 MG/DL (ref 65–99)
HCT VFR BLD AUTO: 44.6 % (ref 34.8–46.1)
HDLC SERPL-MCNC: 72 MG/DL
HGB BLD-MCNC: 14.5 G/DL (ref 11.5–15.4)
LDLC SERPL CALC-MCNC: 94 MG/DL (ref 0–100)
LYMPHOCYTES # BLD AUTO: 2.34 THOUSAND/UL (ref 0.6–4.47)
LYMPHOCYTES # BLD AUTO: 26 % (ref 14–44)
MCH RBC QN AUTO: 30.5 PG (ref 26.8–34.3)
MCHC RBC AUTO-ENTMCNC: 32.5 G/DL (ref 31.4–37.4)
MCV RBC AUTO: 94 FL (ref 82–98)
MONOCYTES # BLD AUTO: 0.76 THOUSAND/UL (ref 0–1.22)
MONOCYTES NFR BLD: 10 % (ref 4–12)
NEUTROPHILS # BLD MANUAL: 2.49 THOUSAND/UL (ref 1.85–7.62)
NEUTS SEG NFR BLD AUTO: 33 % (ref 43–75)
NONHDLC SERPL-MCNC: 105 MG/DL
PATHOLOGY REVIEW: YES
PLATELET # BLD AUTO: 285 THOUSANDS/UL (ref 149–390)
PLATELET BLD QL SMEAR: ADEQUATE
PMV BLD AUTO: 8.7 FL (ref 8.9–12.7)
POTASSIUM SERPL-SCNC: 3.9 MMOL/L (ref 3.5–5.3)
PROT SERPL-MCNC: 7 G/DL (ref 6.4–8.4)
RBC # BLD AUTO: 4.75 MILLION/UL (ref 3.81–5.12)
RBC MORPH BLD: NORMAL
SODIUM SERPL-SCNC: 139 MMOL/L (ref 135–147)
TRIGL SERPL-MCNC: 53 MG/DL (ref ?–150)
VARIANT LYMPHS # BLD AUTO: 5 %
WBC # BLD AUTO: 7.55 THOUSAND/UL (ref 4.31–10.16)

## 2024-11-25 PROCEDURE — 85027 COMPLETE CBC AUTOMATED: CPT

## 2024-11-25 PROCEDURE — 80053 COMPREHEN METABOLIC PANEL: CPT

## 2024-11-25 PROCEDURE — 36415 COLL VENOUS BLD VENIPUNCTURE: CPT

## 2024-11-25 PROCEDURE — 85007 BL SMEAR W/DIFF WBC COUNT: CPT

## 2024-11-25 PROCEDURE — 80061 LIPID PANEL: CPT

## 2024-11-26 ENCOUNTER — TELEPHONE (OUTPATIENT)
Age: 59
End: 2024-11-26

## 2024-11-26 NOTE — TELEPHONE ENCOUNTER
Please let her know that I reviewed her abnormal CBC with Dr. Albert, my collaborative physician- her Eosinophils are very high, this can be allergies but should be repeated in 4 weeks. I see her in 2 weeks, I will discuss at her visit with her what repeat bloodwork will be done and why. This is not emergent unless she is having other symptoms- please confirm if any new symptoms. Thanks!

## 2024-11-26 NOTE — TELEPHONE ENCOUNTER
Called and spoke with pt. She reports she is not experiencing any symptoms other than some congestion at baseline. Takes allergy medicine when needed.     Will discuss at appt on 12/10

## 2024-11-26 NOTE — TELEPHONE ENCOUNTER
Pt called in concerned her lab results say abnormal. Triage nurse informed pt that the labs haven't been reviewed yet. Triage nurse informed pt that once the PCP reviews her labs, she will call patient back with results. Pt verbalized understanding.

## 2024-11-27 PROCEDURE — 85060 BLOOD SMEAR INTERPRETATION: CPT | Performed by: STUDENT IN AN ORGANIZED HEALTH CARE EDUCATION/TRAINING PROGRAM

## 2024-12-10 ENCOUNTER — OFFICE VISIT (OUTPATIENT)
Dept: FAMILY MEDICINE CLINIC | Facility: CLINIC | Age: 59
End: 2024-12-10
Payer: COMMERCIAL

## 2024-12-10 VITALS
OXYGEN SATURATION: 98 % | SYSTOLIC BLOOD PRESSURE: 104 MMHG | HEART RATE: 72 BPM | HEIGHT: 61 IN | WEIGHT: 123 LBS | TEMPERATURE: 98 F | DIASTOLIC BLOOD PRESSURE: 60 MMHG | BODY MASS INDEX: 23.22 KG/M2 | RESPIRATION RATE: 18 BRPM

## 2024-12-10 DIAGNOSIS — Z13.220 SCREENING CHOLESTEROL LEVEL: ICD-10-CM

## 2024-12-10 DIAGNOSIS — R53.83 OTHER FATIGUE: ICD-10-CM

## 2024-12-10 DIAGNOSIS — R79.89 ABNORMAL CBC: ICD-10-CM

## 2024-12-10 DIAGNOSIS — Z00.00 ANNUAL PHYSICAL EXAM: Primary | ICD-10-CM

## 2024-12-10 PROBLEM — I47.10 SVT (SUPRAVENTRICULAR TACHYCARDIA) (HCC): Chronic | Status: RESOLVED | Noted: 2017-07-07 | Resolved: 2024-12-10

## 2024-12-10 PROCEDURE — 99396 PREV VISIT EST AGE 40-64: CPT | Performed by: NURSE PRACTITIONER

## 2024-12-10 NOTE — ASSESSMENT & PLAN NOTE
Orders:    Iron Panel (Includes Ferritin, Iron Sat%, Iron, and TIBC); Future    Vitamin D 25 hydroxy; Future

## 2024-12-10 NOTE — PROGRESS NOTES
"Name: Cee Nunez      : 1965      MRN: [id number]  Encounter Provider: DARRIN Werner  Encounter Date: 12/10/2024   Encounter department: [facility]  :  Assessment & Plan  Abnormal CBC    Orders:    CBC and differential; Future    Other fatigue    Orders:    Iron Panel (Includes Ferritin, Iron Sat%, Iron, and TIBC); Future    Vitamin D 25 hydroxy; Future    Screening cholesterol level    Orders:    Lipid panel; Future    Annual physical exam    Orders:    Comprehensive metabolic panel; Future    CBC and differential; Future          Depression Screening and Follow-up Plan: Patient was screened for depression during today's encounter. They screened negative with a PHQ-2 score of 0.      History of Present Illness     Here for annual physical and lab review-  Her CBC with diff has Eosinophilia-   Path Slide  PERIPHERAL BLOOD SMEAR: EOSINOPHILIA AND ATYPICAL LYMPHOCYTES; SEE IMPRESSION     IMPRESSION:  The peripheral blood smear shows eosinophilia (AEC 1.89 K/uL) and atypical lymphocytes. Erythrocytes are adequate in number, normocytic and normochromic, without significant morphologic abnormalities. Platelets are adequate in number and normal in morphology, without evidence of platelet clumping or satellitism. There is no circulating blast population, significant dysplasia, rouleaux formation, infectious organisms or evidence of hemolysis.      This is the first documented time point with eosinophilia and the overall findings are nonspecific and favored to be reactive. Recommend repeat CBC and differential in 4-6 weeks to confirm resolution. If eosinophilia persists, without clinical explanation, additional workup would be warranted at that time, as clinically indicated.      GHAZAL Botello MD  Interpretation performed at [facility], [address] [place] [address].       She reports she has had some postnasal drip- \"this is always a bad time of year for my " "allergies\", denies other symptoms. Will repeat her CBC in 4 weeks from when it was done (between Christmas and New Years).         Review of Systems   Constitutional:  Positive for fatigue (chronic- not new). Negative for activity change, diaphoresis and fever.   HENT:  Positive for postnasal drip and rhinorrhea. Negative for congestion, facial swelling, hearing loss, sinus pressure, sinus pain, sneezing, sore throat and voice change.    Eyes:  Negative for discharge and visual disturbance.   Respiratory:  Negative for cough, choking, chest tightness, shortness of breath, wheezing and stridor.    Cardiovascular:  Negative for chest pain, palpitations and leg swelling.   Gastrointestinal:  Negative for abdominal distention, abdominal pain, constipation, diarrhea, nausea and vomiting.   Endocrine: Negative for polydipsia, polyphagia and polyuria.   Genitourinary:  Negative for difficulty urinating, dysuria, frequency and urgency.   Musculoskeletal:  Negative for arthralgias, back pain, gait problem, joint swelling, myalgias, neck pain and neck stiffness.   Skin:  Negative for color change, rash and wound.   Neurological:  Negative for dizziness, syncope, speech difficulty, weakness, light-headedness and headaches.   Hematological:  Negative for adenopathy. Does not bruise/bleed easily.   Psychiatric/Behavioral:  Positive for sleep disturbance (when she takes a 4mg OTC antihistamine she sleeps well). Negative for agitation, behavioral problems, confusion, hallucinations and suicidal ideas. The patient is not nervous/anxious.        Objective   /60   Pulse 72   Temp 98 °F (36.7 °C)   Resp 18   Ht 5' 1\" (1.549 m)   Wt 55.8 kg (123 lb)   LMP 01/01/2021 (Approximate)   SpO2 98%   BMI 23.24 kg/m²      Physical Exam  Vitals and nursing note reviewed.   Constitutional:       General: She is not in acute distress.     Appearance: Normal appearance. She is well-developed and normal weight. She is not diaphoretic. "   HENT:      Head: Normocephalic and atraumatic.      Right Ear: Tympanic membrane, ear canal and external ear normal.      Left Ear: Tympanic membrane, ear canal and external ear normal.      Nose: Rhinorrhea (mild- clear) present.      Mouth/Throat:      Pharynx: Uvula midline. Oropharyngeal exudate (clear) present.      Tonsils: No tonsillar exudate.   Eyes:      General:         Right eye: No discharge.         Left eye: No discharge.      Conjunctiva/sclera: Conjunctivae normal.      Pupils: Pupils are equal, round, and reactive to light.   Neck:      Thyroid: No thyromegaly.      Trachea: No tracheal deviation.   Cardiovascular:      Rate and Rhythm: Normal rate and regular rhythm.      Heart sounds: Normal heart sounds. No murmur heard.     No friction rub. No gallop.   Pulmonary:      Effort: Pulmonary effort is normal. No respiratory distress.      Breath sounds: Normal breath sounds. No wheezing.   Abdominal:      General: Bowel sounds are normal. There is no distension.      Palpations: Abdomen is soft. There is no mass.      Tenderness: There is no abdominal tenderness. There is no guarding.   Musculoskeletal:         General: No tenderness or deformity. Normal range of motion.      Cervical back: Normal range of motion and neck supple.      Right lower leg: No edema.      Left lower leg: No edema.   Lymphadenopathy:      Cervical: No cervical adenopathy.   Skin:     General: Skin is warm and dry.      Findings: No erythema or rash.   Neurological:      Mental Status: She is alert and oriented to person, place, and time.   Psychiatric:         Mood and Affect: Mood normal.         Speech: Speech normal.         Behavior: Behavior normal.         Thought Content: Thought content normal.         Judgment: Judgment normal.

## 2024-12-18 ENCOUNTER — DOCUMENTATION (OUTPATIENT)
Dept: GASTROENTEROLOGY | Facility: CLINIC | Age: 59
End: 2024-12-18

## 2024-12-19 ENCOUNTER — OFFICE VISIT (OUTPATIENT)
Dept: GASTROENTEROLOGY | Facility: CLINIC | Age: 59
End: 2024-12-19
Payer: COMMERCIAL

## 2024-12-19 VITALS
RESPIRATION RATE: 16 BRPM | OXYGEN SATURATION: 99 % | HEIGHT: 61 IN | TEMPERATURE: 97.5 F | BODY MASS INDEX: 23.6 KG/M2 | WEIGHT: 125 LBS | DIASTOLIC BLOOD PRESSURE: 70 MMHG | HEART RATE: 92 BPM | SYSTOLIC BLOOD PRESSURE: 116 MMHG

## 2024-12-19 DIAGNOSIS — K21.00 GASTROESOPHAGEAL REFLUX DISEASE WITH ESOPHAGITIS WITHOUT HEMORRHAGE: Primary | ICD-10-CM

## 2024-12-19 DIAGNOSIS — Z86.0100 HISTORY OF COLON POLYPS: ICD-10-CM

## 2024-12-19 DIAGNOSIS — A04.8 H. PYLORI INFECTION: ICD-10-CM

## 2024-12-19 DIAGNOSIS — D72.10 EOSINOPHILIA, UNSPECIFIED TYPE: ICD-10-CM

## 2024-12-19 DIAGNOSIS — R19.8 IRREGULAR BOWEL HABITS: ICD-10-CM

## 2024-12-19 PROCEDURE — 99214 OFFICE O/P EST MOD 30 MIN: CPT | Performed by: INTERNAL MEDICINE

## 2024-12-19 NOTE — ASSESSMENT & PLAN NOTE
Cee is sensing an exacerbation of her reflux symptoms despite using famotidine 40 mg 2 hours before bed.  It is quite possible she has a component of laryngopharyngeal reflux disease.  Her symptoms are somewhat atypical as she does also describe a lot of mucus in the back of her throat which could certainly be related to postnasal drip.  For now I would like her to resume omeprazole 20 mg daily.  Best to take 30 minutes to 1 hour before 1 meal a day.  If her symptoms improve I would suggest taking the omeprazole for 3 months daily and then she can wean this down to every other day.  Continue famotidine 40 mg 2 hours before bed.    Lifestyle modifications for gastroesophageal reflux disease were discussed and include limiting fried and fatty foods, mints, chocolates, carbonated and caffeinated beverages , and alcohol, etc.  Avoid lying down for 2-3 hours after meals.  If you have nighttime symptoms consider raising the head of the bed up on 4-6 inch blocks.  Pillows typically are not useful.

## 2024-12-19 NOTE — ASSESSMENT & PLAN NOTE
Cee was noted to have a significant eosinophilia.  She is having a CBC repeated next week.  Ivet Petersen is following this closely.  If she has a persistent eosinophilia this will require further workup.    After the visit wrapped up, Cee informed that she took a product that she got from [employer].  It was some form of collagen agent.  She developed a significant rash.  She was taking this product up until the time she had her laboratory test done in November which showed the significant eosinophilia.  She has since stopped taking this product.  The product was made by a company called [employer] and was called collagen peptide NAD and ATP support.  This product contains niacin, vitamin B6, vitamin B12, biotin, hydrolyzed fish collagen from COVID skin Verisol which is a bioactive collagen peptide, D-ribose and coenzyme every 10.

## 2024-12-19 NOTE — PATIENT INSTRUCTIONS
Gastroesophageal reflux disease with esophagitis without hemorrhage  Cee is sensing an exacerbation of her reflux symptoms despite using famotidine 40 mg 2 hours before bed.  It is quite possible she has a component of laryngopharyngeal reflux disease.  Her symptoms are somewhat atypical as she does also describe a lot of mucus in the back of her throat which could certainly be related to postnasal drip.  For now I would like her to resume omeprazole 20 mg daily.  Best to take 30 minutes to 1 hour before 1 meal a day.  If her symptoms improve I would suggest taking the omeprazole for 3 months daily and then she can wean this down to every other day.  Continue famotidine 40 mg 2 hours before bed.    Lifestyle modifications for gastroesophageal reflux disease were discussed and include limiting fried and fatty foods, mints, chocolates, carbonated and caffeinated beverages , and alcohol, etc.  Avoid lying down for 2-3 hours after meals.  If you have nighttime symptoms consider raising the head of the bed up on 4-6 inch blocks.  Pillows typically are not useful.        H. pylori infection  Cee was treated for H. pylori in May 2023 with quadruple therapy containing metronidazole, tetracycline, Pepto-Bismol, and omeprazole.  She also recalls being treated for H. pylori about 10 or 11 years ago.  She did have a stool H. pylori antigen in July 2023 that was negative and another stool H. pylori antigen was obtained in June 2024 that was negative.    It seems that her H. pylori has been completely eradicated..  She should continue to monitor her symptoms and let us know if she has any recurrent dyspeptic symptoms.    I would recommend repeating an upper endoscopy in May 2026.    History of colon polyps  Cee was noted to have a 6 mm flat polyp in ascending colon, this was a serrated adenoma.  There is a 4 mm polyp at 45 cm that was a tubular adenoma.  Based upon these findings I recommend repeating colonoscopy around  May 2028.    Irregular bowel habits  Cee reports that her bowel pattern is much improved with the addition of Benefiber.  She is less prone to constipation.  She will continue Benefiber 2 teaspoonfuls mixed in 6 to 8 ounce of noncarbonated liquid daily.  She could increase this to 2 doses daily if she wishes.  Alternative agents would include MiraLAX and or magnesium supplementation.  Prunes, prune juice, and kiwi fruit tend to be natural laxatives.    Eosinophilia  Cee was noted to have a significant eosinophilia.  She is having a CBC repeated next week.  [facility] is following this closely.  If she has a persistent eosinophilia this will require further workup.    After the visit wrapped up, Cee informed that she took a product that she got from [employer].  It was some form of collagen agent.  She developed a significant rash.  She was taking this product up until the time she had her laboratory test done in November which showed the significant eosinophilia.  She has since stopped taking this product.  The product was made by a company called [employer] and was called collagen peptide NAD and ATP support.  This product contains niacin, vitamin B6, vitamin B12, biotin, hydrolyzed fish collagen from COVID skin Verisol which is a bioactive collagen peptide, D-ribose and coenzyme every 10.

## 2024-12-19 NOTE — ASSESSMENT & PLAN NOTE
Cee was treated for H. pylori in May 2023 with quadruple therapy containing metronidazole, tetracycline, Pepto-Bismol, and omeprazole.  She also recalls being treated for H. pylori about 10 or 11 years ago.  She did have a stool H. pylori antigen in July 2023 that was negative and another stool H. pylori antigen was obtained in June 2024 that was negative.    It seems that her H. pylori has been completely eradicated..  She should continue to monitor her symptoms and let us know if she has any recurrent dyspeptic symptoms.    I would recommend repeating an upper endoscopy in May 2026.

## 2024-12-19 NOTE — ASSESSMENT & PLAN NOTE
Cee reports that her bowel pattern is much improved with the addition of Benefiber.  She is less prone to constipation.  She will continue Benefiber 2 teaspoonfuls mixed in 6 to 8 ounce of noncarbonated liquid daily.  She could increase this to 2 doses daily if she wishes.  Alternative agents would include MiraLAX and or magnesium supplementation.  Prunes, prune juice, and kiwi fruit tend to be natural laxatives.

## 2024-12-19 NOTE — PROGRESS NOTES
Records reviewed and outlined below in preparation for office visit 12/19/2024;    11/25/2024 laboratory data  Chemistry complete normal  AST 17  ALT 18  Cholesterol 177  Triglycerides 53  HDL 72  LDL 94  WBC 7.55 Hgb 14.5 HCT 44.6 MCV 94 platelet count 285,033 polys 25% eosinophils 5% atypical lymphocytes 1.89 absolute eosinophils reviewed by pathologist.  Pathologist favored that this was reactive and recommended repeating CBC in near future which her primary care provider has ordered    6/18/2024-CBC normal  Hemoglobin A1c 5.2    6/7/2024 stool H. pylori antigen negative    12/4/2023 laboratory data  Sodium 137  Potassium 3.8  BUN 15  Creatinine 0.76  Glucose 114  AST 14  ALT 16  Alk phos 72  Albumin 4.6  Cholesterol 215  Triglycerides 58  HDL 83    WBC 9.19 Hgb 14.4 HCT 45.6 MCV 94 platelet count 306,000     7/28/2023 stool H. pylori antigen-negative     5/16/2023 colonoscopy  Normal terminal ileum  6 mm flat polyp proximal ascending colon status post cold snare polypectomy.  Sessile serrated adenoma.  4 mm flat polyp at 45 cm status post cold snare polypectomy.  Tubular adenoma  Very mild diverticulosis sigmoid colon  Small external hemorrhoids     5/16/2023 EGD  Normal duodenum to the second portion status post biopsy rule out celiac disease biopsies negative for celiac disease.  Mild antral erythema status post biopsy of the gastric body and antrum to rule out H. pylori.  Biopsy positive for H. pylori.  LA grade A esophagitis  Friability and fine nodularity at 38 cm GE junction status post biopsy.  Biopsy revealed chronic inflammation and reactive changes.  Negative for intestinal metaplasia.  Mild linear furrows in the distal and middle third of the esophagus status post biopsy.  Biopsies negative for EOE.     4/19/2023 laboratory data  WC 6.51 Hgb 14.8 HCT 45.4 MCV 94 platelet count 264,000        6/30/2020 ultrasound right upper quadrant-negative  10/3/2022 CBC and chemistry complete were okay      Patient does report having had an upper endoscopy many years ago.  She does report a history of H. pylori and was treated with antibiotics.  This was diagnosed at time of upper endoscopy

## 2024-12-19 NOTE — PROGRESS NOTES
[facility]  Gastroenterology Outpatient Follow up  Patient Cee Nunez   Age 59 y.o.   Gender female   MRN: [id number]  [facility] [id number]     ASSESSMENT AND PLAN:   Problem List Items Addressed This Visit          Digestive    Gastroesophageal reflux disease with esophagitis without hemorrhage - Primary    Cee is sensing an exacerbation of her reflux symptoms despite using famotidine 40 mg 2 hours before bed.  It is quite possible she has a component of laryngopharyngeal reflux disease.  Her symptoms are somewhat atypical as she does also describe a lot of mucus in the back of her throat which could certainly be related to postnasal drip.  For now I would like her to resume omeprazole 20 mg daily.  Best to take 30 minutes to 1 hour before 1 meal a day.  If her symptoms improve I would suggest taking the omeprazole for 3 months daily and then she can wean this down to every other day.  Continue famotidine 40 mg 2 hours before bed.    Lifestyle modifications for gastroesophageal reflux disease were discussed and include limiting fried and fatty foods, mints, chocolates, carbonated and caffeinated beverages , and alcohol, etc.  Avoid lying down for 2-3 hours after meals.  If you have nighttime symptoms consider raising the head of the bed up on 4-6 inch blocks.  Pillows typically are not useful.             Relevant Medications    omeprazole (PriLOSEC) 20 mg delayed release capsule       Blood    Eosinophilia    Cee was noted to have a significant eosinophilia.  She is having a CBC repeated next week.  [facility] is following this closely.  If she has a persistent eosinophilia this will require further workup.    After the visit wrapped up, Cee informed that she took a product that she got from [employer].  It was some form of collagen agent.  She developed a significant rash.  She was taking this product up until the time she had her laboratory test done in November which showed the significant  eosinophilia.  She has since stopped taking this product.  The product was made by a company called [employer] and was called collagen peptide NAD and ATP support.  This product contains niacin, vitamin B6, vitamin B12, biotin, hydrolyzed fish collagen from COVID skin Verisol which is a bioactive collagen peptide, D-ribose and coenzyme every 10.            Other    H. pylori infection    Cee was treated for H. pylori in May 2023 with quadruple therapy containing metronidazole, tetracycline, Pepto-Bismol, and omeprazole.  She also recalls being treated for H. pylori about 10 or 11 years ago.  She did have a stool H. pylori antigen in July 2023 that was negative and another stool H. pylori antigen was obtained in June 2024 that was negative.    It seems that her H. pylori has been completely eradicated..  She should continue to monitor her symptoms and let us know if she has any recurrent dyspeptic symptoms.    I would recommend repeating an upper endoscopy in May 2026.         History of colon polyps    Cee was noted to have a 6 mm flat polyp in ascending colon, this was a serrated adenoma.  There is a 4 mm polyp at 45 cm that was a tubular adenoma.  Based upon these findings I recommend repeating colonoscopy around May 2028.         Irregular bowel habits    Cee reports that her bowel pattern is much improved with the addition of Benefiber.  She is less prone to constipation.  She will continue Benefiber 2 teaspoonfuls mixed in 6 to 8 ounce of noncarbonated liquid daily.  She could increase this to 2 doses daily if she wishes.  Alternative agents would include MiraLAX and or magnesium supplementation.  Prunes, prune juice, and kiwi fruit tend to be natural laxatives.           _____________________________________________________________    HPI:   Cee is a delightful 59-year-old woman whom seen in the office in follow-up for gastroesophageal reflux disease and mild esophagitis in addition to H. pylori.  She  also has a history of colon polyps.  She reports that she feels like her reflux is getting worse.  She points to the back of her throat as to where she will feel symptoms.  She will have increased mucus production.  She feels that this keeps her awake at night and that she feels like she has extra liquid in the back of her throat although she is not regurgitating anything or bringing anything up.  She does sense a little bit of discomfort in the epigastric region but no significant pain.  This is more of a burning sensation.  There is been no nausea, vomiting, or dysphagia.  She does have a very good appetite and denies any early satiety.  This been no unintentional weight loss    At this time is taking famotidine 40 mg 2 hours before bed.  At time of her last visit back in May she was looking at weaning off famotidine.    Her bowel habits are much improved since starting Benefiber.  Denies any rectal bleeding or black stools.    She also reports dryness in her left nasal passage.  She has seen ENT in the past and told she has postnasal drip.  She also reports some voice changes at times.    She was noted to have significant eosinophilia on her most recent CBC.  There are comments made by the pathologist regarding this as well.  This is being followed by [facility]    Records reviewed and outlined below  11/25/2024 laboratory data  Chemistry complete normal  AST 17  ALT 18  Cholesterol 177  Triglycerides 53  HDL 72  LDL 94  WBC 7.55 Hgb 14.5 HCT 44.6 MCV 94 platelet count 285,033 polys 25% eosinophils 5% atypical lymphocytes 1.89 absolute eosinophils reviewed by pathologist.  Pathologist favored that this was reactive and recommended repeating CBC in near future which her primary care provider has ordered    6/18/2024-CBC normal  Hemoglobin A1c 5.2    6/7/2024 stool H. pylori antigen negative    12/4/2023 laboratory data  Sodium 137  Potassium 3.8  BUN 15  Creatinine 0.76  Glucose 114  AST 14  ALT 16  Alk phos  72  Albumin 4.6  Cholesterol 215  Triglycerides 58  HDL 83    WBC 9.19 Hgb 14.4 HCT 45.6 MCV 94 platelet count 306,000     7/28/2023 stool H. pylori antigen-negative     5/16/2023 colonoscopy  Normal terminal ileum  6 mm flat polyp proximal ascending colon status post cold snare polypectomy.  Sessile serrated adenoma.  4 mm flat polyp at 45 cm status post cold snare polypectomy.  Tubular adenoma  Very mild diverticulosis sigmoid colon  Small external hemorrhoids     5/16/2023 EGD  Normal duodenum to the second portion status post biopsy rule out celiac disease biopsies negative for celiac disease.  Mild antral erythema status post biopsy of the gastric body and antrum to rule out H. pylori.  Biopsy positive for H. pylori.  LA grade A esophagitis  Friability and fine nodularity at 38 cm GE junction status post biopsy.  Biopsy revealed chronic inflammation and reactive changes.  Negative for intestinal metaplasia.  Mild linear furrows in the distal and middle third of the esophagus status post biopsy.  Biopsies negative for EOE.     4/19/2023 laboratory data  WC 6.51 Hgb 14.8 HCT 45.4 MCV 94 platelet count 264,000        6/30/2020 ultrasound right upper quadrant-negative  10/3/2022 CBC and chemistry complete were okay     Patient does report having had an upper endoscopy many years ago.  She does report a history of H. pylori and was treated with antibiotics.  This was diagnosed at time of upper endoscopy    Allergies   Allergen Reactions    Penicillins GI Intolerance     Current Outpatient Medications   Medication Sig Dispense Refill    famotidine (PEPCID) 40 MG tablet Take 1 tablet (40 mg total) by mouth daily at bedtime Take in evening 2 hours prior to bed 90 tablet 3    omeprazole (PriLOSEC) 20 mg delayed release capsule Take 1 capsule (20 mg total) by mouth daily Best to take 30 minutes to 1 hour before 1 meal a day 90 capsule 3    chlorpheniramine (CHLOR-TRIMETON) 4 MG tablet Take 4 mg by mouth daily at  "bedtime (Patient not taking: Reported on 2024)      fluticasone (FLONASE) 50 mcg/act nasal spray 2 sprays into each nostril daily (Patient not taking: Reported on 2024) 18 mL 5    urea (CARMOL) 40 % Apply topically daily (Patient not taking: Reported on 2024)       No current facility-administered medications for this visit.     MEDICAL HISTORY:  Past Medical History:   Diagnosis Date    Allergic     SVT (supraventricular tachycardia) ([facility])      Past Surgical History:   Procedure Laterality Date    CARDIAC ELECTROPHYSIOLOGY STUDY AND ABLATION       SECTION      TUBAL LIGATION       Social History     Substance and Sexual Activity   Alcohol Use Yes    Alcohol/week: 1.0 standard drink of alcohol    Types: 1 Glasses of wine per week    Comment: occasional     Social History     Substance and Sexual Activity   Drug Use No     Social History     Tobacco Use   Smoking Status Never   Smokeless Tobacco Never     Family History   Problem Relation Age of Onset    No Known Problems Mother     No Known Problems Father     No Known Problems Sister     BRCA2 Negative Sister     BRCA1 Negative Sister     Breast cancer Sister 59    No Known Problems Sister     No Known Problems Sister     No Known Problems Daughter     No Known Problems Maternal Grandmother     No Known Problems Paternal Grandmother     No Known Problems Maternal Aunt     No Known Problems Paternal Aunt        Objective   Blood pressure 116/70, pulse 92, temperature 97.5 °F (36.4 °C), resp. rate 16, height 5' 1\" (1.549 m), weight 56.7 kg (125 lb), last menstrual period 2021, SpO2 99%. Body mass index is 23.62 kg/m².    PHYSICAL EXAM:   General Appearance: Alert, cooperative, no distress  HEENT: Normocephalic, atraumatic, anicteric.   Neck: Supple, symmetrical, trachea midline  Lungs: Clear to auscultation bilaterally; no rales, rhonchi or wheezing; respirations unlabored   Heart: Regular rate and rhythm; no murmur, rub, or " gallop.  Abdomen: Soft, bowel sounds normal, non-tender, non-distended; no masses, there is no hepatosplenomegaly. No spider angiomas  Genitalia: Deferred   Rectal: Deferred   Extremities: No cyanosis, clubbing or edema   Skin: No jaundice, rashes, or lesions   Lymph nodes: No palpable cervical lymphadenopathy   Lab Results:   Appointment on 11/25/2024   Component Date Value    Cholesterol 11/25/2024 177     Triglycerides 11/25/2024 53     HDL, Direct 11/25/2024 72     LDL Calculated 11/25/2024 94     Non-HDL-Chol (CHOL-HDL) 11/25/2024 105     Sodium 11/25/2024 139     Potassium 11/25/2024 3.9     Chloride 11/25/2024 102     CO2 11/25/2024 29     ANION GAP 11/25/2024 8     BUN 11/25/2024 19     Creatinine 11/25/2024 0.86     Glucose, Fasting 11/25/2024 95     Calcium 11/25/2024 9.3     AST 11/25/2024 17     ALT 11/25/2024 18     Alkaline Phosphatase 11/25/2024 59     Total Protein 11/25/2024 7.0     Albumin 11/25/2024 4.6     Total Bilirubin 11/25/2024 0.47     eGFR 11/25/2024 74     WBC 11/25/2024 7.55     RBC 11/25/2024 4.75     Hemoglobin 11/25/2024 14.5     Hematocrit 11/25/2024 44.6     MCV 11/25/2024 94     MCH 11/25/2024 30.5     MCHC 11/25/2024 32.5     RDW 11/25/2024 12.4     MPV 11/25/2024 8.7 (L)     Platelets 11/25/2024 285     Segmented % 11/25/2024 33 (L)     Lymphocytes % 11/25/2024 26     Monocytes % 11/25/2024 10     Eosinophils % 11/25/2024 25 (H)     Basophils % 11/25/2024 1     Atypical Lymphocytes % 11/25/2024 5 (H)     Absolute Neutrophils 11/25/2024 2.49     Absolute Lymphocytes 11/25/2024 2.34     Absolute Monocytes 11/25/2024 0.76     Absolute Eosinophils 11/25/2024 1.89 (H)     Absolute Basophils 11/25/2024 0.08     RBC Morphology 11/25/2024 Normal     Platelet Estimate 11/25/2024 Adequate     Pathology Review 11/25/2024 Yes (A)     Case Report 11/25/2024                      Value:Clinical Pathology Report                         Case: [id number]                                   Authorizing Provider:  DARRIN Werner       Collected:           11/25/2024 0635              Ordering Location:     [facility] Received:            11/25/2024 0708                                     Laboratory Services                                                          Pathologist:           Adam Botello MD                                                                           Specimen:    Arm, Right                                                                                 Path Slide 11/25/2024                      Value:PERIPHERAL BLOOD SMEAR: EOSINOPHILIA AND ATYPICAL LYMPHOCYTES; SEE IMPRESSION    IMPRESSION:  The peripheral blood smear shows eosinophilia (AEC 1.89 K/uL) and atypical lymphocytes. Erythrocytes are adequate in number, normocytic and normochromic, without significant morphologic abnormalities. Platelets are adequate in number and normal in morphology, without evidence of platelet clumping or satellitism. There is no circulating blast population, significant dysplasia, rouleaux formation, infectious organisms or evidence of hemolysis.     This is the first documented time point with eosinophilia and the overall findings are nonspecific and favored to be reactive. Recommend repeat CBC and differential in 4-6 weeks to confirm resolution. If eosinophilia persists, without clinical explanation, additional workup would be warranted at that time, as clinically indicated.     GHAZAL Botello MD  Interpretation performed at [facility], [address] [place] [address].        Radiology Results:   No results found.  Lawrence Rivera,    12/19/24   Cc:

## 2024-12-23 ENCOUNTER — APPOINTMENT (OUTPATIENT)
Dept: LAB | Facility: HOSPITAL | Age: 59
End: 2024-12-23
Payer: COMMERCIAL

## 2024-12-23 DIAGNOSIS — R53.83 OTHER FATIGUE: ICD-10-CM

## 2024-12-23 DIAGNOSIS — R79.89 ABNORMAL CBC: ICD-10-CM

## 2024-12-23 LAB
25(OH)D3 SERPL-MCNC: 30.4 NG/ML (ref 30–100)
BASOPHILS # BLD AUTO: 0.07 THOUSANDS/ÂΜL (ref 0–0.1)
BASOPHILS NFR BLD AUTO: 1 % (ref 0–1)
EOSINOPHIL # BLD AUTO: 1.15 THOUSAND/ÂΜL (ref 0–0.61)
EOSINOPHIL NFR BLD AUTO: 20 % (ref 0–6)
ERYTHROCYTE [DISTWIDTH] IN BLOOD BY AUTOMATED COUNT: 12.7 % (ref 11.6–15.1)
FERRITIN SERPL-MCNC: 100 NG/ML (ref 11–307)
HCT VFR BLD AUTO: 42.5 % (ref 34.8–46.1)
HGB BLD-MCNC: 13.6 G/DL (ref 11.5–15.4)
IMM GRANULOCYTES # BLD AUTO: 0.01 THOUSAND/UL (ref 0–0.2)
IMM GRANULOCYTES NFR BLD AUTO: 0 % (ref 0–2)
IRON SATN MFR SERPL: 32 % (ref 15–50)
IRON SERPL-MCNC: 99 UG/DL (ref 50–212)
LYMPHOCYTES # BLD AUTO: 1.69 THOUSANDS/ÂΜL (ref 0.6–4.47)
LYMPHOCYTES NFR BLD AUTO: 30 % (ref 14–44)
MCH RBC QN AUTO: 30.3 PG (ref 26.8–34.3)
MCHC RBC AUTO-ENTMCNC: 32 G/DL (ref 31.4–37.4)
MCV RBC AUTO: 95 FL (ref 82–98)
MONOCYTES # BLD AUTO: 0.41 THOUSAND/ÂΜL (ref 0.17–1.22)
MONOCYTES NFR BLD AUTO: 7 % (ref 4–12)
NEUTROPHILS # BLD AUTO: 2.32 THOUSANDS/ÂΜL (ref 1.85–7.62)
NEUTS SEG NFR BLD AUTO: 42 % (ref 43–75)
NRBC BLD AUTO-RTO: 0 /100 WBCS
PLATELET # BLD AUTO: 261 THOUSANDS/UL (ref 149–390)
PMV BLD AUTO: 9.9 FL (ref 8.9–12.7)
RBC # BLD AUTO: 4.49 MILLION/UL (ref 3.81–5.12)
TIBC SERPL-MCNC: 310.8 UG/DL (ref 250–450)
TRANSFERRIN SERPL-MCNC: 222 MG/DL (ref 203–362)
UIBC SERPL-MCNC: 212 UG/DL (ref 155–355)
WBC # BLD AUTO: 5.65 THOUSAND/UL (ref 4.31–10.16)

## 2024-12-23 PROCEDURE — 82306 VITAMIN D 25 HYDROXY: CPT

## 2024-12-23 PROCEDURE — 85025 COMPLETE CBC W/AUTO DIFF WBC: CPT

## 2024-12-23 PROCEDURE — 83550 IRON BINDING TEST: CPT

## 2024-12-23 PROCEDURE — 82728 ASSAY OF FERRITIN: CPT

## 2024-12-23 PROCEDURE — 83540 ASSAY OF IRON: CPT

## 2024-12-23 PROCEDURE — 36415 COLL VENOUS BLD VENIPUNCTURE: CPT

## 2024-12-24 ENCOUNTER — RESULTS FOLLOW-UP (OUTPATIENT)
Dept: FAMILY MEDICINE CLINIC | Facility: CLINIC | Age: 59
End: 2024-12-24

## 2025-01-06 NOTE — PATIENT INSTRUCTIONS
Get bloodwork done between 7-9am  Consider increasing sodium (salt) into your diet, consider you water intake- either too much or too little as discussed for reasons for low blood pressure  Keep detailed diary of symptoms, blood pressure, and sodium intake for trial and error purposes to figure out if increasing salt is beneficial to improving dizziness  Consider cardiology consult if symptoms continue despite trying these options  Call if any problems/concerns/questions
What Type Of Note Output Would You Prefer (Optional)?: Bullet Format
Has Your Skin Lesion Been Treated?: not been treated
Is This A New Presentation, Or A Follow-Up?: Skin Lesions

## 2025-01-16 DIAGNOSIS — J30.1 ALLERGIC RHINITIS DUE TO POLLEN, UNSPECIFIED SEASONALITY: ICD-10-CM

## 2025-01-16 RX ORDER — FLUTICASONE PROPIONATE 50 MCG
2 SPRAY, SUSPENSION (ML) NASAL DAILY
Qty: 18 ML | Refills: 0 | Status: SHIPPED | OUTPATIENT
Start: 2025-01-16

## 2025-01-16 NOTE — TELEPHONE ENCOUNTER
Reason for call:   [x] Refill   [] Prior Auth  [] Other:     Office:   [x] PCP/Provider -   [] Specialty/Provider -     Medication: Fluticasone 50 mcg, 2 sprays into each nostril daily       Pharmacy: [facility]     Does the patient have enough for 3 days?   [x] Yes   [] No - Send as HP to POD

## 2025-02-06 ENCOUNTER — OFFICE VISIT (OUTPATIENT)
Dept: URGENT CARE | Facility: CLINIC | Age: 60
End: 2025-02-06
Payer: COMMERCIAL

## 2025-02-06 VITALS
SYSTOLIC BLOOD PRESSURE: 141 MMHG | WEIGHT: 129.4 LBS | DIASTOLIC BLOOD PRESSURE: 77 MMHG | RESPIRATION RATE: 16 BRPM | TEMPERATURE: 97.8 F | HEART RATE: 81 BPM | BODY MASS INDEX: 24.45 KG/M2 | OXYGEN SATURATION: 100 %

## 2025-02-06 DIAGNOSIS — J01.90 ACUTE NON-RECURRENT SINUSITIS, UNSPECIFIED LOCATION: ICD-10-CM

## 2025-02-06 DIAGNOSIS — R68.89 FLU-LIKE SYMPTOMS: Primary | ICD-10-CM

## 2025-02-06 DIAGNOSIS — R11.0 NAUSEA: ICD-10-CM

## 2025-02-06 DIAGNOSIS — Z00.00 ANNUAL PHYSICAL EXAM: ICD-10-CM

## 2025-02-06 PROCEDURE — 99213 OFFICE O/P EST LOW 20 MIN: CPT | Performed by: ORTHOPAEDIC SURGERY

## 2025-02-06 PROCEDURE — 87636 SARSCOV2 & INF A&B AMP PRB: CPT | Performed by: ORTHOPAEDIC SURGERY

## 2025-02-06 RX ORDER — ONDANSETRON 4 MG/1
4 TABLET, FILM COATED ORAL EVERY 8 HOURS PRN
Qty: 20 TABLET | Refills: 0 | Status: SHIPPED | OUTPATIENT
Start: 2025-02-06

## 2025-02-06 RX ORDER — OSELTAMIVIR PHOSPHATE 75 MG/1
75 CAPSULE ORAL EVERY 12 HOURS SCHEDULED
Qty: 10 CAPSULE | Refills: 0 | Status: SHIPPED | OUTPATIENT
Start: 2025-02-06 | End: 2025-02-11

## 2025-02-06 RX ORDER — ONDANSETRON 4 MG/1
4 TABLET, ORALLY DISINTEGRATING ORAL ONCE
Status: COMPLETED | OUTPATIENT
Start: 2025-02-06 | End: 2025-02-06

## 2025-02-06 RX ORDER — AZITHROMYCIN 250 MG/1
TABLET, FILM COATED ORAL
Qty: 6 TABLET | Refills: 0 | Status: SHIPPED | OUTPATIENT
Start: 2025-02-06 | End: 2025-02-10

## 2025-02-06 RX ADMIN — ONDANSETRON 4 MG: 4 TABLET, ORALLY DISINTEGRATING ORAL at 19:27

## 2025-02-06 NOTE — LETTER
February 6, 2025     Patient: Cee Nunez   YOB: 1965   Date of Visit: 2/6/2025       To Whom It May Concern:    It is my medical opinion that Cee Nunez may return to work on Monday, 2/10/2025 as long as she remains fever free for 24 hours without the use of anti-fever medication such as Tylenol .    If you have any questions or concerns, please don't hesitate to call.         Sincerely,        Claire Kapadia PA-C    CC: No Recipients

## 2025-02-07 ENCOUNTER — RESULTS FOLLOW-UP (OUTPATIENT)
Dept: URGENT CARE | Facility: CLINIC | Age: 60
End: 2025-02-07

## 2025-02-07 LAB
FLUAV RNA RESP QL NAA+PROBE: NEGATIVE
FLUBV RNA RESP QL NAA+PROBE: NEGATIVE
SARS-COV-2 RNA RESP QL NAA+PROBE: NEGATIVE

## 2025-02-07 NOTE — PATIENT INSTRUCTIONS
If COVID/flu testing comes back positive, continue to take Tamiflu as prescribed. The antibiotic is not warranted at this time as your symptoms are most likely related to your viral illness.     If COVID/flu testing comes back negative, you may start the antibiotic for a likely sinus infection.    Fever and pain control:  Ibuprofen (Motrin) 600mg every 6 hours for fever, headaches, body aches   Ibuprofen is an NSAID. Please stop medication if you experience stomach/abdominal pain and report to your primary care provider.   Ask your primary care provider before you take NSAIDs if you are on any blood thinners, or if you have a history of heart disease, kidney disease, gastric bypass surgery, GI bleed, or poorly controlled high blood pressure.   May use acetaminophen (Tylenol) as directed on the bottle between doses of ibuprofen. Do not exceed 4,000mg of Tylenol a day.   Cough & Congestion:  Guaifenesin (Mucinex) as directed on the bottle for congestion and mucous-y cough.   Dextromethorphan (Delsym, Robitussin) for dry cough and cough suppression   Pseudoephedrine (Sudafed) for congestion and sinus pressure   Sudafed may cause increased heart rate, irregular heart rate, and an increase in blood pressure. Please do not take Sudafed if you have a history of heart disease or high blood pressure.   Sudafed should not be taken if you are on anti-depressants such as those belonging to the class MAOIs or tricyclics.  Coricidin HBP (chlorpheniramine maleate) can be used as a decongestant in place of other options for those unable to take Sudafed.   Combination cough and cold such as Dimetapp and Mucinex DM also available  Sudafed PE Head Congestion +Flu Severe contains a combination of Sudafed, Tylenol, Mucinex, and Delsym  If prescribed, take Tessalon Pearles or Bromfed/Phenergan DM as directed  Avoid taking prescription cough/congestion medication and OTC options at the same time  Sore Throat:  Cepacol  lozenges  Chloraseptic spray  Throat Coat tea  Warm salt water gargles   Vitamin/Minerals:  Vitamin D3 2,000 IU daily  Vitamin C 1000mg twice a day  Some studies suggest that Zinc 12.5-15mg every 2 hours while awake for 5 days may shorten symptom duration by 1-2 days  Other:   Plenty of fluids and rest  Cool mist humidifiers  Nasal sinus rinses such as NettiPot, Neimed, or Navage can be used to help flush out sinuses  Please only use distilled/sterile water that can be purchased at your local pharmacy  Nasal spray options:  Nasal steroid sprays such as Flonase, Nasonex, Nasacort may help with sinus congestion, itchy/watery eyes, clogged ears  These options must be used consistently for at least 2 weeks for full effect  Afrin nasal spray for quick acting congestion relief  Saline nasal spray for dry nose, irritation of the nasal passages  Follow up with PCP in 3-5 days  Proceed to the ED if symptoms worsen

## 2025-02-07 NOTE — PROGRESS NOTES
[facility]        NAME: Cee Nunez is a 59 y.o. female  : 1965    MRN: [id number]  DATE: 2025  TIME: 7:33 PM    Assessment and Plan   Flu-like symptoms [R68.89]  1. Flu-like symptoms  oseltamivir (TAMIFLU) 75 mg capsule    Covid/Flu- Office Collect Normal    Covid/Flu- Office Collect Normal      2. Nausea  ondansetron (ZOFRAN-ODT) dispersible tablet 4 mg    ondansetron (ZOFRAN) 4 mg tablet      3. Acute non-recurrent sinusitis, unspecified location  azithromycin (ZITHROMAX) 250 mg tablet      4. Annual physical exam  CBC and differential        History and exam of congestion, fatigue, headache and nausea are most consistent with influenza. Recommended starting Tamiflu. Her complaint of worsening sinus pain following a week of congestion, however, could also be consistent with a sinus infection. Discussed with the patient that if her flu test result is negative, she should discontinue the Tamiflu and start the antibiotic prescribed. The patient verbalized understanding.     Patient Instructions     If COVID/flu testing comes back positive, continue to take Tamiflu as prescribed. The antibiotic is not warranted at this time as your symptoms are most likely related to your viral illness.     If COVID/flu testing comes back negative, you may start the antibiotic for a likely sinus infection.    Fever and pain control:  Ibuprofen (Motrin) 600mg every 6 hours for fever, headaches, body aches   Ibuprofen is an NSAID. Please stop medication if you experience stomach/abdominal pain and report to your primary care provider.   Ask your primary care provider before you take NSAIDs if you are on any blood thinners, or if you have a history of heart disease, kidney disease, gastric bypass surgery, GI bleed, or poorly controlled high blood pressure.   May use acetaminophen (Tylenol) as directed on the bottle between doses of ibuprofen. Do not exceed 4,000mg of Tylenol a day.   Cough &  Congestion:  Guaifenesin (Mucinex) as directed on the bottle for congestion and mucous-y cough.   Dextromethorphan (Delsym, Robitussin) for dry cough and cough suppression   Pseudoephedrine (Sudafed) for congestion and sinus pressure   Sudafed may cause increased heart rate, irregular heart rate, and an increase in blood pressure. Please do not take Sudafed if you have a history of heart disease or high blood pressure.   Sudafed should not be taken if you are on anti-depressants such as those belonging to the class MAOIs or tricyclics.  Coricidin HBP (chlorpheniramine maleate) can be used as a decongestant in place of other options for those unable to take Sudafed.   Combination cough and cold such as Dimetapp and Mucinex DM also available  Sudafed PE Head Congestion +Flu Severe contains a combination of Sudafed, Tylenol, Mucinex, and Delsym  If prescribed, take Tessalon Pearles or Bromfed/Phenergan DM as directed  Avoid taking prescription cough/congestion medication and OTC options at the same time  Sore Throat:  Cepacol lozenges  Chloraseptic spray  Throat Coat tea  Warm salt water gargles   Vitamin/Minerals:  Vitamin D3 2,000 IU daily  Vitamin C 1000mg twice a day  Some studies suggest that Zinc 12.5-15mg every 2 hours while awake for 5 days may shorten symptom duration by 1-2 days  Other:   Plenty of fluids and rest  Cool mist humidifiers  Nasal sinus rinses such as NettiPot, Neimed, or Navage can be used to help flush out sinuses  Please only use distilled/sterile water that can be purchased at your local pharmacy  Nasal spray options:  Nasal steroid sprays such as Flonase, Nasonex, Nasacort may help with sinus congestion, itchy/watery eyes, clogged ears  These options must be used consistently for at least 2 weeks for full effect  Afrin nasal spray for quick acting congestion relief  Saline nasal spray for dry nose, irritation of the nasal passages  Follow up with PCP in 3-5 days  Proceed to the ED if  symptoms worsen    If tests are performed, our office will contact you with results only if changes need to made to the care plan discussed with you at the visit. [facility]    Chief Complaint     Chief Complaint   Patient presents with    Nausea     Nausea, headache, diarrhea ,teeth hurt facial pain started today it  started as a nasal drip a week ago          History of Present Illness       59 YOF presents to the urgent care for evaluation of body aches, headache, congestion, nausea, abdominal pain. Symptoms started today suddenly. She also notes, however, that for the past week she has been experiencing congestion and allergy-like symptoms which today have worsened with sinus pain radiating to her jaw. She notes this sinus pain is worse on the left side. The patient does have a history of seasonal allergies this time of year. She denies any fevers. No cough. No history of asthma, COPD, or smoking. For symptom relief she has tried [place] and Tylenol sinus severe.         Review of Systems   Review of Systems   Constitutional:  Positive for chills and fatigue. Negative for fever.   HENT:  Positive for congestion, sinus pressure and sinus pain. Negative for ear pain and sore throat.    Eyes:  Negative for pain and visual disturbance.   Respiratory:  Negative for cough and shortness of breath.    Cardiovascular:  Negative for chest pain and palpitations.   Gastrointestinal:  Positive for abdominal pain and nausea. Negative for vomiting.   Genitourinary:  Negative for dysuria and hematuria.   Musculoskeletal:  Positive for myalgias. Negative for arthralgias and back pain.   Skin:  Negative for color change and rash.   Neurological:  Positive for headaches. Negative for dizziness, seizures, syncope and light-headedness.   All other systems reviewed and are negative.        Current Medications       Current Outpatient Medications:     azithromycin (ZITHROMAX) 250 mg  tablet, Take 2 tablets today then 1 tablet daily x 4 days, Disp: 6 tablet, Rfl: 0    famotidine (PEPCID) 40 MG tablet, Take 1 tablet (40 mg total) by mouth daily at bedtime Take in evening 2 hours prior to bed, Disp: 90 tablet, Rfl: 3    omeprazole (PriLOSEC) 20 mg delayed release capsule, Take 1 capsule (20 mg total) by mouth daily Best to take 30 minutes to 1 hour before 1 meal a day, Disp: 90 capsule, Rfl: 3    ondansetron (ZOFRAN) 4 mg tablet, Take 1 tablet (4 mg total) by mouth every 8 (eight) hours as needed for nausea or vomiting, Disp: 20 tablet, Rfl: 0    oseltamivir (TAMIFLU) 75 mg capsule, Take 1 capsule (75 mg total) by mouth every 12 (twelve) hours for 5 days, Disp: 10 capsule, Rfl: 0    chlorpheniramine (CHLOR-TRIMETON) 4 MG tablet, Take 4 mg by mouth daily at bedtime (Patient not taking: Reported on 2025), Disp: , Rfl:     fluticasone (FLONASE) 50 mcg/act nasal spray, 2 sprays into each nostril daily (Patient not taking: Reported on 2025), Disp: 18 mL, Rfl: 0    urea (CARMOL) 40 %, Apply topically daily (Patient not taking: Reported on 2025), Disp: , Rfl:   No current facility-administered medications for this visit.    Current Allergies     Allergies as of 2025 - Reviewed 2025   Allergen Reaction Noted    Penicillins GI Intolerance 2020            The following portions of the patient's history were reviewed and updated as appropriate: allergies, current medications, past family history, past medical history, past social history, past surgical history and problem list.     Past Medical History:   Diagnosis Date    Allergic     SVT (supraventricular tachycardia) (HCC)        Past Surgical History:   Procedure Laterality Date    CARDIAC ELECTROPHYSIOLOGY STUDY AND ABLATION       SECTION      TUBAL LIGATION         Family History   Problem Relation Age of Onset    No Known Problems Mother     No Known Problems Father     No Known Problems Sister     BRCA2 Negative  Sister     BRCA1 Negative Sister     Breast cancer Sister 59    No Known Problems Sister     No Known Problems Sister     No Known Problems Daughter     No Known Problems Maternal Grandmother     No Known Problems Paternal Grandmother     No Known Problems Maternal Aunt     No Known Problems Paternal Aunt          Medications have been verified.        Objective   /77   Pulse 81   Temp 97.8 °F (36.6 °C)   Resp 16   Wt 58.7 kg (129 lb 6.4 oz)   LMP 01/01/2021 (Approximate)   SpO2 100%   BMI 24.45 kg/m²        Physical Exam     Physical Exam  Vitals and nursing note reviewed.   Constitutional:       General: She is not in acute distress.     Appearance: Normal appearance. She is not ill-appearing.   HENT:      Head: Normocephalic and atraumatic.      Right Ear: Tympanic membrane normal.      Left Ear: Tympanic membrane normal.      Nose: Nose normal.      Mouth/Throat:      Mouth: Mucous membranes are moist.      Pharynx: Oropharynx is clear. No oropharyngeal exudate or posterior oropharyngeal erythema.   Eyes:      Extraocular Movements: Extraocular movements intact.      Pupils: Pupils are equal, round, and reactive to light.   Cardiovascular:      Rate and Rhythm: Normal rate and regular rhythm.      Pulses: Normal pulses.      Heart sounds: Normal heart sounds. No murmur heard.  Pulmonary:      Effort: Pulmonary effort is normal. No respiratory distress.      Breath sounds: Normal breath sounds. No wheezing or rhonchi.   Abdominal:      General: Bowel sounds are normal.      Palpations: Abdomen is soft.      Tenderness: There is no abdominal tenderness. There is no right CVA tenderness or left CVA tenderness.   Musculoskeletal:         General: Normal range of motion.      Cervical back: Normal range of motion.   Lymphadenopathy:      Cervical: No cervical adenopathy.   Skin:     General: Skin is warm and dry.      Capillary Refill: Capillary refill takes less than 2 seconds.   Neurological:       General: No focal deficit present.      Mental Status: She is alert and oriented to person, place, and time.   Psychiatric:         Mood and Affect: Mood normal.         Behavior: Behavior normal.

## 2025-06-03 DIAGNOSIS — K21.00 GASTROESOPHAGEAL REFLUX DISEASE WITH ESOPHAGITIS WITHOUT HEMORRHAGE: ICD-10-CM

## 2025-06-04 RX ORDER — FAMOTIDINE 40 MG/1
TABLET, FILM COATED ORAL
Qty: 90 TABLET | Refills: 1 | Status: SHIPPED | OUTPATIENT
Start: 2025-06-04

## 2025-06-18 ENCOUNTER — DOCUMENTATION (OUTPATIENT)
Dept: GASTROENTEROLOGY | Facility: CLINIC | Age: 60
End: 2025-06-18

## 2025-06-18 NOTE — PROGRESS NOTES
Records reviewed and outlined below in preparation for office visit 6/19/2025;    12/23/2024 laboratory data  Iron 99  Ferritin 100  Iron saturation 33%  TIBC 310  Vitamin D 30.4  WC 5.65 Hgb 13.6 HCT 42.5 MCV 95 platelet count 261,000.  42 polys 20% eosinophils absolute eosinophil count 1.15 (1150) improved from 1.89 on 11/25/2024 11/25/2024 laboratory data  Chemistry complete normal  AST 17  ALT 18  Cholesterol 177  Triglycerides 53  HDL 72  LDL 94  WBC 7.55 Hgb 14.5 HCT 44.6 MCV 94 platelet count 285,033 polys 25% eosinophils 5% atypical lymphocytes 1.89 absolute eosinophils reviewed by pathologist.  Pathologist favored that this was reactive and recommended repeating CBC in near future which her primary care provider has ordered     6/18/2024-CBC normal  Hemoglobin A1c 5.2     6/7/2024 stool H. pylori antigen negative     12/4/2023 laboratory data  Sodium 137  Potassium 3.8  BUN 15  Creatinine 0.76  Glucose 114  AST 14  ALT 16  Alk phos 72  Albumin 4.6  Cholesterol 215  Triglycerides 58  HDL 83    WBC 9.19 Hgb 14.4 HCT 45.6 MCV 94 platelet count 306,000     7/28/2023 stool H. pylori antigen-negative     5/16/2023 colonoscopy  Normal terminal ileum  6 mm flat polyp proximal ascending colon status post cold snare polypectomy.  Sessile serrated adenoma.  4 mm flat polyp at 45 cm status post cold snare polypectomy.  Tubular adenoma  Very mild diverticulosis sigmoid colon  Small external hemorrhoids     5/16/2023 EGD  Normal duodenum to the second portion status post biopsy rule out celiac disease biopsies negative for celiac disease.  Mild antral erythema status post biopsy of the gastric body and antrum to rule out H. pylori.  Biopsy positive for H. pylori.  LA grade A esophagitis  Friability and fine nodularity at 38 cm GE junction status post biopsy.  Biopsy revealed chronic inflammation and reactive changes.  Negative for intestinal metaplasia.  Mild linear furrows in the distal and middle third of the  esophagus status post biopsy.  Biopsies negative for EOE.     4/19/2023 laboratory data  WC 6.51 Hgb 14.8 HCT 45.4 MCV 94 platelet count 264,000        6/30/2020 ultrasound right upper quadrant-negative  10/3/2022 CBC and chemistry complete were okay     Patient does report having had an upper endoscopy many years ago.  She does report a history of H. pylori and was treated with antibiotics.  This was diagnosed at time of upper endoscopy

## 2025-06-19 ENCOUNTER — OFFICE VISIT (OUTPATIENT)
Dept: GASTROENTEROLOGY | Facility: CLINIC | Age: 60
End: 2025-06-19
Payer: COMMERCIAL

## 2025-06-19 VITALS
HEIGHT: 61 IN | OXYGEN SATURATION: 98 % | RESPIRATION RATE: 18 BRPM | WEIGHT: 126.8 LBS | BODY MASS INDEX: 23.94 KG/M2 | DIASTOLIC BLOOD PRESSURE: 64 MMHG | HEART RATE: 91 BPM | SYSTOLIC BLOOD PRESSURE: 110 MMHG | TEMPERATURE: 98.7 F

## 2025-06-19 DIAGNOSIS — A04.8 H. PYLORI INFECTION: ICD-10-CM

## 2025-06-19 DIAGNOSIS — R19.8 IRREGULAR BOWEL HABITS: ICD-10-CM

## 2025-06-19 DIAGNOSIS — K21.00 GASTROESOPHAGEAL REFLUX DISEASE WITH ESOPHAGITIS WITHOUT HEMORRHAGE: Primary | ICD-10-CM

## 2025-06-19 DIAGNOSIS — Z86.0100 HISTORY OF COLON POLYPS: ICD-10-CM

## 2025-06-19 DIAGNOSIS — D72.10 EOSINOPHILIA, UNSPECIFIED TYPE: ICD-10-CM

## 2025-06-19 PROCEDURE — 99214 OFFICE O/P EST MOD 30 MIN: CPT | Performed by: INTERNAL MEDICINE

## 2025-06-19 NOTE — ASSESSMENT & PLAN NOTE
For the most part her bowel habits are improved with the use of fiber supplement such as Benefiber.  She does still report an occasional constipation the day after an adequate bowel movement.  She could increase her Benefiber to 2 teaspoonfuls mixed in 6 to 8 ounces of noncarbonated liquid twice a day to see how this goes.  Keeping in mind that the higher dose of fiber could lead to some gas and bloating

## 2025-06-19 NOTE — ASSESSMENT & PLAN NOTE
Cee was noted to have a significant eosinophilia November 2024 she had a follow-up CBC in December 23, 2024.  This continues to reveal a significant eosinophilia with an absolute eosinophil count of about 1150.  She does not describe any endorgan symptoms such as asthma, wheezing, rash, GI symptoms, arthralgias excetra.  This been no unintentional weight loss.  She denies any night sweats or fevers.  There is been no recent travel.  Given the significantly elevated eosinophil count I think it is reasonable to pursue allergy/immunology consultation.  I will place a few orders to get things started such as ELVIS, B12, folate, tryptase, immunoglobulin levels and a repeat CBC and a C-reactive protein.  I will also place an order for immunology consultation.  She should follow-up with Ivet regarding this as well.    Orders:  •  ELVIS by IFA Reflex for Rheumatologist; Future  •  Folate; Future  •  Magnesium; Future  •  Vitamin B12; Future  •  IgG, IgA, IgM; Future  •  IgG 1, 2, 3, and 4; Future  •  IgE; Future  •  C-reactive protein; Future  •  CBC; Future  •  Ambulatory Referral to Allergy; Future  •  Tryptase; Future

## 2025-06-19 NOTE — PROGRESS NOTES
Name: Cee Nunez      : 1965      MRN: [id number]  Encounter Provider: Lawrence Rivera DO  Encounter Date: 2025   Encounter department: [facility] [place]  :  Assessment & Plan  Gastroesophageal reflux disease with esophagitis without hemorrhage  Patient does continue to experience some epigastric burning but extremely short-lived and fleeting.  The exact cause for the burning is not clear.  Not sure that it is really acid related.  She is not experiencing much a lot of heartburn with omeprazole 20 mg daily.  She is also using famotidine 40 mg 2 hours before bed.  She will continue with this regimen.  I would like to check B12, folate, and magnesium.           Eosinophilia, unspecified type  Cee was noted to have a significant eosinophilia 2024 she had a follow-up CBC in 2024.  This continues to reveal a significant eosinophilia with an absolute eosinophil count of about 1150.  She does not describe any endorgan symptoms such as asthma, wheezing, rash, GI symptoms, arthralgias excetra.  This been no unintentional weight loss.  She denies any night sweats or fevers.  There is been no recent travel.  Given the significantly elevated eosinophil count I think it is reasonable to pursue allergy/immunology consultation.  I will place a few orders to get things started such as ELVIS, B12, folate, tryptase, immunoglobulin levels and a repeat CBC and a C-reactive protein.  I will also place an order for immunology consultation.  She should follow-up with Ivet regarding this as well.    Orders:  •  ELVIS by IFA Reflex for Rheumatologist; Future  •  Folate; Future  •  Magnesium; Future  •  Vitamin B12; Future  •  IgG, IgA, IgM; Future  •  IgG 1, 2, 3, and 4; Future  •  IgE; Future  •  C-reactive protein; Future  •  CBC; Future  •  Ambulatory Referral to Allergy; Future  •  Tryptase; Future    H. pylori infection  Cee was treated for H. pylori in  May 2023 with quadruple therapy containing metronidazole, tetracycline, Pepto-Bismol, and omeprazole.  She also recalls being treated for H. pylori about 10 or 11 years ago.  She did have a stool H. pylori antigen in July 2023 that was negative and another stool H. pylori antigen was obtained in June 2024 that was negative.    It seems that her H. pylori has been completely eradicated..  She should continue to monitor her symptoms and let us know if she has any recurrent dyspeptic symptoms.    I would recommend repeating an upper endoscopy in May 2026.       History of colon polyps  Cee was noted to have a 6 mm flat polyp in ascending colon, this was a serrated adenoma.  There is a 4 mm polyp at 45 cm that was a tubular adenoma.  Based upon these findings I recommend repeating colonoscopy around May 2028.       Irregular bowel habits  For the most part her bowel habits are improved with the use of fiber supplement such as Benefiber.  She does still report an occasional constipation the day after an adequate bowel movement.  She could increase her Benefiber to 2 teaspoonfuls mixed in 6 to 8 ounces of noncarbonated liquid twice a day to see how this goes.  Keeping in mind that the higher dose of fiber could lead to some gas and bloating           History of Present Illness   HPI  Cee Nunez is a 60 y.o. female who presents for follow-up of epigastric burning/GERD history of H. pylori and history of colon polyps.  Cee reports that she has been feeling fairly well.  She is on omeprazole 20 mg daily and famotidine 40 mg in the evening.  She continues to experience burning in the epigastric region almost daily.  This may occur 2 times a day and sometimes at night.  This is very brief, its like a flash and does not last very long.  There is no change with eating.  There is no nausea vomiting or early satiety.  There has been no unintentional weight loss in fact she has been gaining weight.    Her bowel habits are  improved with the use of a fiber supplement on a daily basis.  There has been no rectal bleeding or black stools.    She continues to have an eosinophilia noted on her last blood test from December.  She had been using a collagen supplement prior to the initial blood test that showed an eosinophilia but she had stopped taking it before the blood test in December.  This been no recent travel.  Denies any diarrhea.  She is not using any other supplements.  There is been no rash no wheezing.    Records reviewed and outlined below prior to office visit.    12/23/2024 laboratory data  Iron 99  Ferritin 100  Iron saturation 33%  TIBC 310  Vitamin D 30.4  WBC 5.65 Hgb 13.6 HCT 42.5 MCV 95 platelet count 261,000.  42 polys 20% eosinophils absolute eosinophil count 1.15 (1150) improved from 1.89 on 11/25/2024 11/25/2024 laboratory data  Chemistry complete normal  AST 17  ALT 18  Cholesterol 177  Triglycerides 53  HDL 72  LDL 94  WBC 7.55 Hgb 14.5 HCT 44.6 MCV 94 platelet count 285,033 polys 25% eosinophils 5% atypical lymphocytes 1.89 absolute eosinophils reviewed by pathologist.  Pathologist favored that this was reactive and recommended repeating CBC in near future which her primary care provider has ordered     6/18/2024-CBC normal  Hemoglobin A1c 5.2     6/7/2024 stool H. pylori antigen negative     12/4/2023 laboratory data  Sodium 137  Potassium 3.8  BUN 15  Creatinine 0.76  Glucose 114  AST 14  ALT 16  Alk phos 72  Albumin 4.6  Cholesterol 215  Triglycerides 58  HDL 83    WBC 9.19 Hgb 14.4 HCT 45.6 MCV 94 platelet count 306,000     7/28/2023 stool H. pylori antigen-negative     5/16/2023 colonoscopy  Normal terminal ileum  6 mm flat polyp proximal ascending colon status post cold snare polypectomy.  Sessile serrated adenoma.  4 mm flat polyp at 45 cm status post cold snare polypectomy.  Tubular adenoma  Very mild diverticulosis sigmoid colon  Small external hemorrhoids     5/16/2023 EGD  Normal duodenum to  the second portion status post biopsy rule out celiac disease biopsies negative for celiac disease.  Mild antral erythema status post biopsy of the gastric body and antrum to rule out H. pylori.  Biopsy positive for H. pylori.  LA grade A esophagitis  Friability and fine nodularity at 38 cm GE junction status post biopsy.  Biopsy revealed chronic inflammation and reactive changes.  Negative for intestinal metaplasia.  Mild linear furrows in the distal and middle third of the esophagus status post biopsy.  Biopsies negative for EOE.     4/19/2023 laboratory data  WC 6.51 Hgb 14.8 HCT 45.4 MCV 94 platelet count 264,000        6/30/2020 ultrasound right upper quadrant-negative  10/3/2022 CBC and chemistry complete were okay     Patient does report having had an upper endoscopy many years ago.  She does report a history of H. pylori and was treated with antibiotics.  This was diagnosed at time of upper endoscopy      Review of Systems  Past Medical History   Past Medical History[1]  Past Surgical History[2]  Family History[3]   reports that she has never smoked. She has never used smokeless tobacco. She reports current alcohol use of about 1.0 standard drink of alcohol per week. She reports that she does not use drugs.  Current Outpatient Medications   Medication Instructions   • chlorpheniramine (CHLOR-TRIMETON) 4 mg, Daily at bedtime   • famotidine (PEPCID) 40 MG tablet TAKE ONE TABLET (40 MG TOTAL) BY MOUTH DAILY IN THE EVENING 2 HOURS PRIOR TO BED   • fluticasone (FLONASE) 50 mcg/act nasal spray 2 sprays, Nasal, Daily   • omeprazole (PRILOSEC) 20 mg, Oral, Daily, Best to take 30 minutes to 1 hour before 1 meal a day   • ondansetron (ZOFRAN) 4 mg, Oral, Every 8 hours PRN   • urea (CARMOL) 40 % Daily   Allergies[4]   Medications Ordered Prior to Encounter[5]   Social History[6]     Objective   /64 (BP Location: Right arm, Patient Position: Sitting, Cuff Size: Standard)   Pulse 91   Temp 98.7 °F (37.1 °C)  "(Temporal)   Resp 18   Ht 5' 1\" (1.549 m)   Wt 57.5 kg (126 lb 12.8 oz)   LMP 2021 (Approximate)   SpO2 98%   BMI 23.96 kg/m²      Physical Exam    General Appearance: Alert, cooperative, no distress  HEENT: Normocephalic, atraumatic, anicteric.   Neck: Supple, symmetrical, trachea midline  Lungs: Clear to auscultation bilaterally; no rales, rhonchi or wheezing; respirations unlabored   Heart: Regular rate and rhythm; no murmur, rub, or gallop.  Abdomen: Soft, bowel sounds normal, non-tender, non-distended; no masses, there is no hepatosplenomegaly. No spider angiomas    Genitalia: Deferred   Rectal: Deferred   Extremities: No cyanosis, clubbing or edema   Skin: No jaundice, rashes, or lesions   Lymph nodes: No palpable cervical lymphadenopathy                [1]  Past Medical History:  Diagnosis Date   • Allergic    • SVT (supraventricular tachycardia) (HCC)    [2]  Past Surgical History:  Procedure Laterality Date   • CARDIAC ELECTROPHYSIOLOGY STUDY AND ABLATION     •  SECTION     • TUBAL LIGATION     [3]  Family History  Problem Relation Name Age of Onset   • No Known Problems Mother     • No Known Problems Father     • No Known Problems Sister jeffrey    • BRCA2 Negative Sister yari    • BRCA1 Negative Sister yari    • Breast cancer Sister yari 59   • No Known Problems Sister inder    • No Known Problems Sister indio    • No Known Problems Daughter talon    • No Known Problems Maternal Grandmother     • No Known Problems Paternal Grandmother     • No Known Problems Maternal Aunt     • No Known Problems Paternal Aunt     [4]  Allergies  Allergen Reactions   • Penicillins GI Intolerance   [5]  Current Outpatient Medications on File Prior to Visit   Medication Sig Dispense Refill   • famotidine (PEPCID) 40 MG tablet TAKE ONE TABLET (40 MG TOTAL) BY MOUTH DAILY IN THE EVENING 2 HOURS PRIOR TO BED 90 tablet 1   • omeprazole (PriLOSEC) 20 mg delayed release capsule Take 1 capsule (20 mg total) by " mouth daily Best to take 30 minutes to 1 hour before 1 meal a day 90 capsule 3   • chlorpheniramine (CHLOR-TRIMETON) 4 MG tablet Take 4 mg by mouth daily at bedtime (Patient not taking: Reported on 12/19/2024)     • fluticasone (FLONASE) 50 mcg/act nasal spray 2 sprays into each nostril daily (Patient not taking: Reported on 2/6/2025) 18 mL 0   • ondansetron (ZOFRAN) 4 mg tablet Take 1 tablet (4 mg total) by mouth every 8 (eight) hours as needed for nausea or vomiting (Patient not taking: Reported on 6/19/2025) 20 tablet 0   • urea (CARMOL) 40 % Apply topically daily (Patient not taking: Reported on 12/19/2024)       No current facility-administered medications on file prior to visit.   [6]  Social History  Tobacco Use   • Smoking status: Never   • Smokeless tobacco: Never   Vaping Use   • Vaping status: Never Used   Substance and Sexual Activity   • Alcohol use: Yes     Alcohol/week: 1.0 standard drink of alcohol     Types: 1 Glasses of wine per week     Comment: occasional   • Drug use: No   • Sexual activity: Yes     Birth control/protection: Post-menopausal, Female Sterilization     Comment: Tubal

## 2025-06-19 NOTE — PATIENT INSTRUCTIONS
Gastroesophageal reflux disease with esophagitis without hemorrhage  Patient does continue to experience some epigastric burning but extremely short-lived and fleeting.  The exact cause for the burning is not clear.  Not sure that it is really acid related.  She is not experiencing much a lot of heartburn with omeprazole 20 mg daily.  She is also using famotidine 40 mg 2 hours before bed.  She will continue with this regimen.  I would like to check B12, folate, and magnesium.        Eosinophilia  Cee was noted to have a significant eosinophilia November 2024 she had a follow-up CBC in December 23, 2024.  This continues to reveal a significant eosinophilia with an absolute eosinophil count of about 1150.  She does not describe any endorgan symptoms such as asthma, wheezing, rash, GI symptoms, arthralgias excetra.  This been no unintentional weight loss.  She denies any night sweats or fevers.  There is been no recent travel.  Given the significantly elevated eosinophil count I think it is reasonable to pursue allergy/immunology consultation.  I will place a few orders to get things started such as ELVIS, B12, folate, tryptase, immunoglobulin levels and a repeat CBC and a C-reactive protein.  I will also place an order for immunology consultation.  She should follow-up with Ivet regarding this as well.      H. pylori infection  Cee was treated for H. pylori in May 2023 with quadruple therapy containing metronidazole, tetracycline, Pepto-Bismol, and omeprazole.  She also recalls being treated for H. pylori about 10 or 11 years ago.  She did have a stool H. pylori antigen in July 2023 that was negative and another stool H. pylori antigen was obtained in June 2024 that was negative.    It seems that her H. pylori has been completely eradicated..  She should continue to monitor her symptoms and let us know if she has any recurrent dyspeptic symptoms.    I would recommend repeating an upper endoscopy in May  2026.    History of colon polyps  Cee was noted to have a 6 mm flat polyp in ascending colon, this was a serrated adenoma.  There is a 4 mm polyp at 45 cm that was a tubular adenoma.  Based upon these findings I recommend repeating colonoscopy around May 2028.    Irregular bowel habits  For the most part her bowel habits are improved with the use of fiber supplement such as Benefiber.  She does still report an occasional constipation the day after an adequate bowel movement.  She could increase her Benefiber to 2 teaspoonfuls mixed in 6 to 8 ounces of noncarbonated liquid twice a day to see how this goes.  Keeping in mind that the higher dose of fiber could lead to some gas and bloating

## 2025-06-23 ENCOUNTER — RESULTS FOLLOW-UP (OUTPATIENT)
Dept: GASTROENTEROLOGY | Facility: CLINIC | Age: 60
End: 2025-06-23

## 2025-06-23 ENCOUNTER — APPOINTMENT (OUTPATIENT)
Dept: LAB | Facility: HOSPITAL | Age: 60
End: 2025-06-23
Payer: COMMERCIAL

## 2025-06-23 DIAGNOSIS — D72.10 EOSINOPHILIA, UNSPECIFIED TYPE: ICD-10-CM

## 2025-06-23 LAB
CRP SERPL QL: 4.5 MG/L
ERYTHROCYTE [DISTWIDTH] IN BLOOD BY AUTOMATED COUNT: 12.4 % (ref 11.6–15.1)
FOLATE SERPL-MCNC: >22.3 NG/ML
HCT VFR BLD AUTO: 46.9 % (ref 34.8–46.1)
HGB BLD-MCNC: 14.8 G/DL (ref 11.5–15.4)
IGA SERPL-MCNC: 167 MG/DL (ref 66–433)
IGG SERPL-MCNC: 805 MG/DL (ref 635–1741)
IGM SERPL-MCNC: 149 MG/DL (ref 45–281)
MAGNESIUM SERPL-MCNC: 2 MG/DL (ref 1.9–2.7)
MCH RBC QN AUTO: 30 PG (ref 26.8–34.3)
MCHC RBC AUTO-ENTMCNC: 31.6 G/DL (ref 31.4–37.4)
MCV RBC AUTO: 95 FL (ref 82–98)
PLATELET # BLD AUTO: 279 THOUSANDS/UL (ref 149–390)
PMV BLD AUTO: 9.3 FL (ref 8.9–12.7)
RBC # BLD AUTO: 4.93 MILLION/UL (ref 3.81–5.12)
VIT B12 SERPL-MCNC: 699 PG/ML (ref 180–914)
WBC # BLD AUTO: 6.45 THOUSAND/UL (ref 4.31–10.16)

## 2025-06-23 PROCEDURE — 36415 COLL VENOUS BLD VENIPUNCTURE: CPT

## 2025-06-23 PROCEDURE — 83735 ASSAY OF MAGNESIUM: CPT

## 2025-06-23 PROCEDURE — 82746 ASSAY OF FOLIC ACID SERUM: CPT

## 2025-06-23 PROCEDURE — 86039 ANTINUCLEAR ANTIBODIES (ANA): CPT

## 2025-06-23 PROCEDURE — 86038 ANTINUCLEAR ANTIBODIES: CPT

## 2025-06-23 PROCEDURE — 83520 IMMUNOASSAY QUANT NOS NONAB: CPT

## 2025-06-23 PROCEDURE — 86140 C-REACTIVE PROTEIN: CPT

## 2025-06-23 PROCEDURE — 82607 VITAMIN B-12: CPT

## 2025-06-23 PROCEDURE — 82785 ASSAY OF IGE: CPT

## 2025-06-23 PROCEDURE — 82784 ASSAY IGA/IGD/IGG/IGM EACH: CPT

## 2025-06-23 PROCEDURE — 85027 COMPLETE CBC AUTOMATED: CPT

## 2025-06-23 PROCEDURE — 86225 DNA ANTIBODY NATIVE: CPT

## 2025-06-23 PROCEDURE — 82787 IGG 1 2 3 OR 4 EACH: CPT

## 2025-06-24 ENCOUNTER — ANNUAL EXAM (OUTPATIENT)
Dept: OBGYN CLINIC | Facility: CLINIC | Age: 60
End: 2025-06-24
Payer: COMMERCIAL

## 2025-06-24 VITALS
BODY MASS INDEX: 23.71 KG/M2 | DIASTOLIC BLOOD PRESSURE: 68 MMHG | WEIGHT: 125.6 LBS | SYSTOLIC BLOOD PRESSURE: 112 MMHG | HEIGHT: 61 IN

## 2025-06-24 DIAGNOSIS — N95.1 VASOMOTOR SYMPTOMS DUE TO MENOPAUSE: ICD-10-CM

## 2025-06-24 DIAGNOSIS — Z01.419 ENCOUNTER FOR WELL WOMAN EXAM WITH ROUTINE GYNECOLOGICAL EXAM: Primary | ICD-10-CM

## 2025-06-24 LAB — TOTAL IGE SMQN RAST: 24.9 KU/L (ref 0–113)

## 2025-06-24 PROCEDURE — S0612 ANNUAL GYNECOLOGICAL EXAMINA: HCPCS | Performed by: STUDENT IN AN ORGANIZED HEALTH CARE EDUCATION/TRAINING PROGRAM

## 2025-06-24 PROCEDURE — G0145 SCR C/V CYTO,THINLAYER,RESCR: HCPCS | Performed by: STUDENT IN AN ORGANIZED HEALTH CARE EDUCATION/TRAINING PROGRAM

## 2025-06-24 PROCEDURE — G0476 HPV COMBO ASSAY CA SCREEN: HCPCS | Performed by: STUDENT IN AN ORGANIZED HEALTH CARE EDUCATION/TRAINING PROGRAM

## 2025-06-24 RX ORDER — ESTRADIOL 0.05 MG/D
1 PATCH, EXTENDED RELEASE TRANSDERMAL 2 TIMES WEEKLY
Qty: 24 PATCH | Refills: 3 | Status: SHIPPED | OUTPATIENT
Start: 2025-06-26

## 2025-06-24 RX ORDER — PROGESTERONE 100 MG/1
1 CAPSULE ORAL
Qty: 90 CAPSULE | Refills: 3 | Status: SHIPPED | OUTPATIENT
Start: 2025-06-24

## 2025-06-24 NOTE — PROGRESS NOTES
Name: Cee Nunez      : 1965      MRN: [id number]  Encounter Provider: Lizzeth Keita MD  Encounter Date: 2025   Encounter department: [facility] [place]  :  Assessment & Plan  Encounter for well woman exam with routine gynecological exam  - Routine well woman exam completed today.  - Cervical Cancer Screening: Current ASCCP Guidelines reviewed. Pap done today.  - STI screening offered including HIV: Declines  - Breast Cancer Screening: Last Mammogram 2024,   - Colorectal cancer screenin2023  - The following were reviewed in today's visit: Vasomotor symptoms, vaginal dryness    Orders:    Liquid-based pap, screening    Vasomotor symptoms due to menopause    Orders:    estradiol (Vivelle-Dot) 0.05 MG/24HR; Place 1 patch on the skin 2 (two) times a week    Progesterone 100 MG CAPS; Take 1 capsule by mouth at bedtime        History of Present Illness   She reports ongoing issues with insomnia, hot flashes, and vaginal dryness.  She reports no breast concerns. Her last mammogram was normal.  Her last colonoscopy was  years ago and recommended follow up is in  years.  She is postmenopausal and reports  no postmenopausal bleeding. She has no vaginal discharge, vulvar or vaginal lesions, pelvic pain.   She has no symptoms of pelvic organ prolapse, urinary, or fecal incontinence.  She denies intimate partner violence.          Gynecologic Exam  Pertinent negatives include no abdominal pain, chills, dysuria, fever, frequency, headaches, nausea, urgency or vomiting.     Cee Nunez is a 60 y.o. female who presents for annual breast and pelvic exam.    History obtained from: patient    Review of Systems   Constitutional:  Negative for chills and fever.   Respiratory:  Negative for cough and shortness of breath.    Cardiovascular:  Negative for chest pain and leg swelling.   Gastrointestinal:  Negative for abdominal pain, nausea and vomiting.   Genitourinary:   "Negative for dysuria, frequency and urgency.   Neurological:  Negative for dizziness, light-headedness and headaches.     Medical History Reviewed by provider this encounter:     .     Objective   /68   Ht 5' 1\" (1.549 m)   Wt 57 kg (125 lb 9.6 oz)   LMP 01/01/2021 (Approximate)   BMI 23.73 kg/m²      Physical Exam  Constitutional:       Appearance: Normal appearance.   HENT:      Head: Normocephalic and atraumatic.     Cardiovascular:      Rate and Rhythm: Normal rate.      Pulses: Normal pulses.   Pulmonary:      Effort: Pulmonary effort is normal.   Chest:   Breasts:     Right: Normal. No swelling, bleeding, inverted nipple, mass, nipple discharge, skin change or tenderness.      Left: Normal. No swelling, bleeding, inverted nipple, mass, nipple discharge, skin change or tenderness.   Abdominal:      General: There is no distension.      Palpations: Abdomen is soft.      Tenderness: There is no abdominal tenderness.      Hernia: There is no hernia in the left inguinal area or right inguinal area.   Genitourinary:     Exam position: Lithotomy position.      Labia:         Right: No rash, tenderness or lesion.         Left: No rash, tenderness or lesion.       Urethra: No prolapse or urethral lesion.      Vagina: Normal. No vaginal discharge, bleeding, lesions or prolapsed vaginal walls.      Cervix: Normal. No lesion or cervical bleeding.      Uterus: Normal. Not enlarged, not tender and no uterine prolapse.       Adnexa: Right adnexa normal and left adnexa normal.        Right: No tenderness or fullness.          Left: No tenderness or fullness.        Rectum: No anal fissure or external hemorrhoid.     Musculoskeletal:         General: Normal range of motion.   Lymphadenopathy:      Upper Body:      Right upper body: No supraclavicular, axillary or pectoral adenopathy.      Left upper body: No supraclavicular, axillary or pectoral adenopathy.      Lower Body: No right inguinal adenopathy. No left " inguinal adenopathy.     Skin:     General: Skin is warm and dry.     Neurological:      General: No focal deficit present.      Mental Status: She is alert and oriented to person, place, and time.     Psychiatric:         Mood and Affect: Mood normal.         Behavior: Behavior normal.             Lizzeth Keita MD  [facility]  [facility]  6/24/2025 11:18 AM

## 2025-06-25 LAB
ANA PAT SER IF-IMP: ABNORMAL
ANA SER QL IF: POSITIVE
ANA TITR SER HEP2 SUBST: ABNORMAL {TITER}
HPV HR 12 DNA CVX QL NAA+PROBE: NEGATIVE
HPV16 DNA CVX QL NAA+PROBE: NEGATIVE
HPV18 DNA CVX QL NAA+PROBE: NEGATIVE
IGG SERPL-MCNC: 821 MG/DL (ref 586–1602)
IGG1 SER-MCNC: 494 MG/DL (ref 248–810)
IGG2 SER-MCNC: 172 MG/DL (ref 130–555)
IGG3 SER-MCNC: 70 MG/DL (ref 15–102)
IGG4 SER-MCNC: 7 MG/DL (ref 2–96)
TRYPTASE SERPL-MCNC: 5 UG/L (ref 2.2–13.2)

## 2025-06-30 ENCOUNTER — RESULTS FOLLOW-UP (OUTPATIENT)
Dept: LABOR AND DELIVERY | Facility: HOSPITAL | Age: 60
End: 2025-06-30

## 2025-06-30 DIAGNOSIS — N95.2 VAGINAL ATROPHY: Primary | ICD-10-CM

## 2025-06-30 LAB
LAB AP GYN PRIMARY INTERPRETATION: NORMAL
Lab: NORMAL

## 2025-07-02 LAB
DSDNA IGG SERPL IA-ACNC: <0.9 IU/ML (ref ?–15)
NUCLEAR IGG SER IA-RTO: <0.09 RATIO (ref ?–1)

## 2025-07-03 ENCOUNTER — TELEPHONE (OUTPATIENT)
Age: 60
End: 2025-07-03

## 2025-07-03 DIAGNOSIS — N95.1 VASOMOTOR SYMPTOMS DUE TO MENOPAUSE: ICD-10-CM

## 2025-07-03 DIAGNOSIS — Z12.31 ENCOUNTER FOR SCREENING MAMMOGRAM FOR MALIGNANT NEOPLASM OF BREAST: Primary | ICD-10-CM

## 2025-07-03 NOTE — TELEPHONE ENCOUNTER
Patient asking for mammogram order. Was seen for yearly 06/2025. Order placed per protocol. Informed she call central scheduling to schedule.    Patient also asking where Vivelle-Dot was sent, as she only received progesterone capsules. Informed both Vivelle-Dot and progesterone caps were sent to same [facility]. She should call pharmacy to inquire. Patient asking if she can also have RX for estrace 0.1mg vaginal cream refilled. Routing to provider.

## 2025-07-03 NOTE — TELEPHONE ENCOUNTER
"Patient spoke with pharmacy regarding her patch prescription.     Pharmacy advised her they could not fill it, was going to be $200 for the patches since they are considered a \"free\" pharmacy.     Patient will need to have it filled at her local pharmacy using her insurance.     Would like patches to be sent to [facility]. Added to pharmacy list.     [facility] unable to re-order per protocol, outside of 7 day window. Routing to provider for re-order patches to [facility].  "

## 2025-07-08 ENCOUNTER — TELEPHONE (OUTPATIENT)
Dept: FAMILY MEDICINE CLINIC | Facility: CLINIC | Age: 60
End: 2025-07-08

## 2025-07-08 DIAGNOSIS — R76.8 POSITIVE ANA (ANTINUCLEAR ANTIBODY): Primary | ICD-10-CM

## 2025-07-08 DIAGNOSIS — J30.1 ALLERGIC RHINITIS DUE TO POLLEN, UNSPECIFIED SEASONALITY: ICD-10-CM

## 2025-07-08 NOTE — TELEPHONE ENCOUNTER
Patient called Rx refill line asking for a callback to discuss her lab results. ELVIS in particular. She stated she spoke to the ordering doctor and was told to contact PCP for further information.    Please contact patient at [phone]

## 2025-07-08 NOTE — TELEPHONE ENCOUNTER
Please let her know I sent a message to her Allergist to discuss if this is something she would manage or if she should be referred to Rheumatology. Let her know there is a very high false positive rate of this test being positive. Is she having any specific symptoms?  I will be in touch with her when I hear back from her Allergist.

## 2025-07-08 NOTE — TELEPHONE ENCOUNTER
Reason for call:   [x] Refill   [] Prior Auth  [] Other:     Office:   [x] PCP/Provider - DARRIN Werner   [] Specialty/Provider -     Medication: fluticasone (FLONASE) 50 mcg/act nasal spray     Dose/Frequency: 2 spray, Nasal, Daily     Quantity: 18 ml    Pharmacy: [facility] [place] [place] - [address] [place] [address] Pharmacy   Does the patient have enough for 3 days?   [x] Yes   [] No - Send as HP to POD    Mail Away Pharmacy   Does the patient have enough for 10 days?   [] Yes   [] No - Send as HP to POD    none

## 2025-07-09 RX ORDER — FLUTICASONE PROPIONATE 50 MCG
2 SPRAY, SUSPENSION (ML) NASAL DAILY
Qty: 18 ML | Refills: 2 | Status: SHIPPED | OUTPATIENT
Start: 2025-07-09

## 2025-07-10 RX ORDER — ESTRADIOL 0.1 MG/G
0.5 CREAM VAGINAL 2 TIMES WEEKLY
Qty: 42.5 G | Refills: 1 | Status: SHIPPED | OUTPATIENT
Start: 2025-07-10

## 2025-07-10 RX ORDER — ESTRADIOL 0.05 MG/D
1 PATCH, EXTENDED RELEASE TRANSDERMAL 2 TIMES WEEKLY
Qty: 24 PATCH | Refills: 3 | Status: SHIPPED | OUTPATIENT
Start: 2025-07-10

## 2025-07-10 NOTE — TELEPHONE ENCOUNTER
Called patient to review that prescriptions have been sent. Patient received progesterone from mail order pharmacy.

## 2025-07-10 NOTE — TELEPHONE ENCOUNTER
Please let patient know that her Allergist Dr. Queen does not manage positive ELVIS levels and would like her referred to Rheumatology. Please put in referral and give her information to schedule. Please let her know that this is not Urgent and a visit in 6-8 months would be reasonable. For now ask her to take Ibuprofen or Tylenol when her legs are bothering her and to let me know if any new symptoms develop.

## 2025-07-28 ENCOUNTER — CONSULT (OUTPATIENT)
Age: 60
End: 2025-07-28
Payer: COMMERCIAL

## 2025-07-28 VITALS
DIASTOLIC BLOOD PRESSURE: 70 MMHG | BODY MASS INDEX: 23.41 KG/M2 | HEIGHT: 61 IN | OXYGEN SATURATION: 99 % | WEIGHT: 124 LBS | SYSTOLIC BLOOD PRESSURE: 126 MMHG | TEMPERATURE: 96.6 F | HEART RATE: 77 BPM

## 2025-07-28 DIAGNOSIS — R76.8 POSITIVE ANA (ANTINUCLEAR ANTIBODY): ICD-10-CM

## 2025-07-28 DIAGNOSIS — D72.10 EOSINOPHILIA, UNSPECIFIED TYPE: Primary | ICD-10-CM

## 2025-07-28 PROCEDURE — 99204 OFFICE O/P NEW MOD 45 MIN: CPT | Performed by: INTERNAL MEDICINE

## 2025-08-11 ENCOUNTER — APPOINTMENT (OUTPATIENT)
Dept: LAB | Facility: HOSPITAL | Age: 60
End: 2025-08-11
Payer: COMMERCIAL

## 2025-08-20 ENCOUNTER — RESULTS FOLLOW-UP (OUTPATIENT)
Age: 60
End: 2025-08-20